# Patient Record
Sex: FEMALE | Race: OTHER | HISPANIC OR LATINO | Employment: UNEMPLOYED | URBAN - METROPOLITAN AREA
[De-identification: names, ages, dates, MRNs, and addresses within clinical notes are randomized per-mention and may not be internally consistent; named-entity substitution may affect disease eponyms.]

---

## 2023-08-19 ENCOUNTER — HOSPITAL ENCOUNTER (INPATIENT)
Facility: MEDICAL CENTER | Age: 33
LOS: 1 days | DRG: 419 | End: 2023-08-20
Attending: EMERGENCY MEDICINE | Admitting: SURGERY

## 2023-08-19 ENCOUNTER — ANESTHESIA (OUTPATIENT)
Dept: SURGERY | Facility: MEDICAL CENTER | Age: 33
DRG: 419 | End: 2023-08-19

## 2023-08-19 ENCOUNTER — ANESTHESIA EVENT (OUTPATIENT)
Dept: SURGERY | Facility: MEDICAL CENTER | Age: 33
DRG: 419 | End: 2023-08-19

## 2023-08-19 ENCOUNTER — APPOINTMENT (OUTPATIENT)
Dept: RADIOLOGY | Facility: MEDICAL CENTER | Age: 33
DRG: 419 | End: 2023-08-19
Attending: EMERGENCY MEDICINE

## 2023-08-19 DIAGNOSIS — R79.89 ELEVATED LFTS: ICD-10-CM

## 2023-08-19 DIAGNOSIS — K80.00 ACUTE CALCULOUS CHOLECYSTITIS: ICD-10-CM

## 2023-08-19 DIAGNOSIS — K80.20 SYMPTOMATIC CHOLELITHIASIS: ICD-10-CM

## 2023-08-19 DIAGNOSIS — K81.0 ACUTE CHOLECYSTITIS: ICD-10-CM

## 2023-08-19 LAB
ALBUMIN SERPL BCP-MCNC: 4.6 G/DL (ref 3.2–4.9)
ALBUMIN/GLOB SERPL: 1.5 G/DL
ALP SERPL-CCNC: 441 U/L (ref 30–99)
ALT SERPL-CCNC: 176 U/L (ref 2–50)
AMORPH CRY #/AREA URNS HPF: PRESENT /HPF
ANION GAP SERPL CALC-SCNC: 18 MMOL/L (ref 7–16)
APPEARANCE UR: CLEAR
AST SERPL-CCNC: 85 U/L (ref 12–45)
BACTERIA #/AREA URNS HPF: ABNORMAL /HPF
BASOPHILS # BLD AUTO: 0.4 % (ref 0–1.8)
BASOPHILS # BLD: 0.04 K/UL (ref 0–0.12)
BILIRUB SERPL-MCNC: 1.6 MG/DL (ref 0.1–1.5)
BILIRUB UR QL STRIP.AUTO: NEGATIVE
BUN SERPL-MCNC: 9 MG/DL (ref 8–22)
CALCIUM ALBUM COR SERPL-MCNC: 9.3 MG/DL (ref 8.5–10.5)
CALCIUM SERPL-MCNC: 9.8 MG/DL (ref 8.5–10.5)
CHLORIDE SERPL-SCNC: 105 MMOL/L (ref 96–112)
CO2 SERPL-SCNC: 16 MMOL/L (ref 20–33)
COLOR UR: YELLOW
CREAT SERPL-MCNC: 0.53 MG/DL (ref 0.5–1.4)
EOSINOPHIL # BLD AUTO: 0.07 K/UL (ref 0–0.51)
EOSINOPHIL NFR BLD: 0.7 % (ref 0–6.9)
EPI CELLS #/AREA URNS HPF: ABNORMAL /HPF
ERYTHROCYTE [DISTWIDTH] IN BLOOD BY AUTOMATED COUNT: 40.8 FL (ref 35.9–50)
GFR SERPLBLD CREATININE-BSD FMLA CKD-EPI: 126 ML/MIN/1.73 M 2
GLOBULIN SER CALC-MCNC: 3 G/DL (ref 1.9–3.5)
GLUCOSE SERPL-MCNC: 113 MG/DL (ref 65–99)
GLUCOSE UR STRIP.AUTO-MCNC: NEGATIVE MG/DL
HCG SERPL QL: NEGATIVE
HCT VFR BLD AUTO: 35.7 % (ref 37–47)
HGB BLD-MCNC: 11 G/DL (ref 12–16)
HYALINE CASTS #/AREA URNS LPF: ABNORMAL /LPF
IMM GRANULOCYTES # BLD AUTO: 0.03 K/UL (ref 0–0.11)
IMM GRANULOCYTES NFR BLD AUTO: 0.3 % (ref 0–0.9)
KETONES UR STRIP.AUTO-MCNC: ABNORMAL MG/DL
LEUKOCYTE ESTERASE UR QL STRIP.AUTO: ABNORMAL
LIPASE SERPL-CCNC: 44 U/L (ref 11–82)
LYMPHOCYTES # BLD AUTO: 1.68 K/UL (ref 1–4.8)
LYMPHOCYTES NFR BLD: 17.4 % (ref 22–41)
MCH RBC QN AUTO: 20.4 PG (ref 27–33)
MCHC RBC AUTO-ENTMCNC: 30.8 G/DL (ref 32.2–35.5)
MCV RBC AUTO: 66.2 FL (ref 81.4–97.8)
MICRO URNS: ABNORMAL
MONOCYTES # BLD AUTO: 0.52 K/UL (ref 0–0.85)
MONOCYTES NFR BLD AUTO: 5.4 % (ref 0–13.4)
NEUTROPHILS # BLD AUTO: 7.3 K/UL (ref 1.82–7.42)
NEUTROPHILS NFR BLD: 75.8 % (ref 44–72)
NITRITE UR QL STRIP.AUTO: NEGATIVE
NRBC # BLD AUTO: 0 K/UL
NRBC BLD-RTO: 0 /100 WBC (ref 0–0.2)
PH UR STRIP.AUTO: 8 [PH] (ref 5–8)
PLATELET # BLD AUTO: 219 K/UL (ref 164–446)
PMV BLD AUTO: 10.1 FL (ref 9–12.9)
POTASSIUM SERPL-SCNC: 3.4 MMOL/L (ref 3.6–5.5)
PROT SERPL-MCNC: 7.6 G/DL (ref 6–8.2)
PROT UR QL STRIP: NEGATIVE MG/DL
RBC # BLD AUTO: 5.39 M/UL (ref 4.2–5.4)
RBC # URNS HPF: ABNORMAL /HPF
RBC UR QL AUTO: NEGATIVE
SODIUM SERPL-SCNC: 139 MMOL/L (ref 135–145)
SP GR UR STRIP.AUTO: 1.01
UROBILINOGEN UR STRIP.AUTO-MCNC: 1 MG/DL
WBC # BLD AUTO: 9.6 K/UL (ref 4.8–10.8)
WBC #/AREA URNS HPF: ABNORMAL /HPF

## 2023-08-19 PROCEDURE — 700111 HCHG RX REV CODE 636 W/ 250 OVERRIDE (IP): Mod: JZ

## 2023-08-19 PROCEDURE — 700101 HCHG RX REV CODE 250: Performed by: SURGERY

## 2023-08-19 PROCEDURE — 160035 HCHG PACU - 1ST 60 MINS PHASE I: Performed by: SURGERY

## 2023-08-19 PROCEDURE — 96375 TX/PRO/DX INJ NEW DRUG ADDON: CPT

## 2023-08-19 PROCEDURE — 700111 HCHG RX REV CODE 636 W/ 250 OVERRIDE (IP): Mod: JZ | Performed by: EMERGENCY MEDICINE

## 2023-08-19 PROCEDURE — 160029 HCHG SURGERY MINUTES - 1ST 30 MINS LEVEL 4: Performed by: SURGERY

## 2023-08-19 PROCEDURE — 47562 LAPAROSCOPIC CHOLECYSTECTOMY: CPT | Mod: AS | Performed by: NURSE PRACTITIONER

## 2023-08-19 PROCEDURE — 700102 HCHG RX REV CODE 250 W/ 637 OVERRIDE(OP): Performed by: NURSE PRACTITIONER

## 2023-08-19 PROCEDURE — 770001 HCHG ROOM/CARE - MED/SURG/GYN PRIV*

## 2023-08-19 PROCEDURE — 80053 COMPREHEN METABOLIC PANEL: CPT

## 2023-08-19 PROCEDURE — 83690 ASSAY OF LIPASE: CPT

## 2023-08-19 PROCEDURE — A9270 NON-COVERED ITEM OR SERVICE: HCPCS | Performed by: STUDENT IN AN ORGANIZED HEALTH CARE EDUCATION/TRAINING PROGRAM

## 2023-08-19 PROCEDURE — 700111 HCHG RX REV CODE 636 W/ 250 OVERRIDE (IP): Performed by: STUDENT IN AN ORGANIZED HEALTH CARE EDUCATION/TRAINING PROGRAM

## 2023-08-19 PROCEDURE — 700111 HCHG RX REV CODE 636 W/ 250 OVERRIDE (IP)

## 2023-08-19 PROCEDURE — 700105 HCHG RX REV CODE 258: Mod: JZ | Performed by: STUDENT IN AN ORGANIZED HEALTH CARE EDUCATION/TRAINING PROGRAM

## 2023-08-19 PROCEDURE — 160048 HCHG OR STATISTICAL LEVEL 1-5: Performed by: SURGERY

## 2023-08-19 PROCEDURE — 160036 HCHG PACU - EA ADDL 30 MINS PHASE I: Performed by: SURGERY

## 2023-08-19 PROCEDURE — A9270 NON-COVERED ITEM OR SERVICE: HCPCS | Performed by: NURSE PRACTITIONER

## 2023-08-19 PROCEDURE — 700102 HCHG RX REV CODE 250 W/ 637 OVERRIDE(OP): Performed by: SURGERY

## 2023-08-19 PROCEDURE — 160009 HCHG ANES TIME/MIN: Performed by: SURGERY

## 2023-08-19 PROCEDURE — 700101 HCHG RX REV CODE 250: Performed by: STUDENT IN AN ORGANIZED HEALTH CARE EDUCATION/TRAINING PROGRAM

## 2023-08-19 PROCEDURE — 700111 HCHG RX REV CODE 636 W/ 250 OVERRIDE (IP): Mod: JZ | Performed by: STUDENT IN AN ORGANIZED HEALTH CARE EDUCATION/TRAINING PROGRAM

## 2023-08-19 PROCEDURE — 99291 CRITICAL CARE FIRST HOUR: CPT

## 2023-08-19 PROCEDURE — 0FT44ZZ RESECTION OF GALLBLADDER, PERCUTANEOUS ENDOSCOPIC APPROACH: ICD-10-PCS | Performed by: SURGERY

## 2023-08-19 PROCEDURE — 84703 CHORIONIC GONADOTROPIN ASSAY: CPT

## 2023-08-19 PROCEDURE — 76705 ECHO EXAM OF ABDOMEN: CPT

## 2023-08-19 PROCEDURE — 47562 LAPAROSCOPIC CHOLECYSTECTOMY: CPT | Performed by: SURGERY

## 2023-08-19 PROCEDURE — 160002 HCHG RECOVERY MINUTES (STAT): Performed by: SURGERY

## 2023-08-19 PROCEDURE — 160041 HCHG SURGERY MINUTES - EA ADDL 1 MIN LEVEL 4: Performed by: SURGERY

## 2023-08-19 PROCEDURE — 36415 COLL VENOUS BLD VENIPUNCTURE: CPT

## 2023-08-19 PROCEDURE — 700102 HCHG RX REV CODE 250 W/ 637 OVERRIDE(OP): Performed by: STUDENT IN AN ORGANIZED HEALTH CARE EDUCATION/TRAINING PROGRAM

## 2023-08-19 PROCEDURE — 99222 1ST HOSP IP/OBS MODERATE 55: CPT | Mod: 57 | Performed by: SURGERY

## 2023-08-19 PROCEDURE — 81001 URINALYSIS AUTO W/SCOPE: CPT

## 2023-08-19 PROCEDURE — 85025 COMPLETE CBC W/AUTO DIFF WBC: CPT

## 2023-08-19 PROCEDURE — A9270 NON-COVERED ITEM OR SERVICE: HCPCS | Performed by: SURGERY

## 2023-08-19 PROCEDURE — 96374 THER/PROPH/DIAG INJ IV PUSH: CPT

## 2023-08-19 RX ORDER — OXYCODONE HYDROCHLORIDE 5 MG/1
5 TABLET ORAL
Status: DISCONTINUED | OUTPATIENT
Start: 2023-08-19 | End: 2023-08-20

## 2023-08-19 RX ORDER — OXYCODONE HCL 5 MG/5 ML
5 SOLUTION, ORAL ORAL
Status: COMPLETED | OUTPATIENT
Start: 2023-08-19 | End: 2023-08-19

## 2023-08-19 RX ORDER — MORPHINE SULFATE 4 MG/ML
4 INJECTION INTRAVENOUS ONCE
Status: COMPLETED | OUTPATIENT
Start: 2023-08-19 | End: 2023-08-19

## 2023-08-19 RX ORDER — ACETAMINOPHEN 500 MG
1000 TABLET ORAL EVERY 6 HOURS
Status: DISCONTINUED | OUTPATIENT
Start: 2023-08-20 | End: 2023-08-20 | Stop reason: HOSPADM

## 2023-08-19 RX ORDER — CELECOXIB 200 MG/1
400 CAPSULE ORAL ONCE
Status: COMPLETED | OUTPATIENT
Start: 2023-08-19 | End: 2023-08-19

## 2023-08-19 RX ORDER — CELECOXIB 200 MG/1
200 CAPSULE ORAL 2 TIMES DAILY PRN
Status: DISCONTINUED | OUTPATIENT
Start: 2023-08-24 | End: 2023-08-20 | Stop reason: HOSPADM

## 2023-08-19 RX ORDER — ENOXAPARIN SODIUM 100 MG/ML
40 INJECTION SUBCUTANEOUS DAILY
Status: DISCONTINUED | OUTPATIENT
Start: 2023-08-20 | End: 2023-08-20 | Stop reason: HOSPADM

## 2023-08-19 RX ORDER — DIPHENHYDRAMINE HYDROCHLORIDE 50 MG/ML
12.5 INJECTION INTRAMUSCULAR; INTRAVENOUS
Status: DISCONTINUED | OUTPATIENT
Start: 2023-08-19 | End: 2023-08-19 | Stop reason: HOSPADM

## 2023-08-19 RX ORDER — EPHEDRINE SULFATE 50 MG/ML
5 INJECTION, SOLUTION INTRAVENOUS
Status: DISCONTINUED | OUTPATIENT
Start: 2023-08-19 | End: 2023-08-19 | Stop reason: HOSPADM

## 2023-08-19 RX ORDER — BUPIVACAINE HYDROCHLORIDE AND EPINEPHRINE 5; 5 MG/ML; UG/ML
INJECTION, SOLUTION EPIDURAL; INTRACAUDAL; PERINEURAL
Status: DISCONTINUED | OUTPATIENT
Start: 2023-08-19 | End: 2023-08-19 | Stop reason: HOSPADM

## 2023-08-19 RX ORDER — HYDRALAZINE HYDROCHLORIDE 20 MG/ML
5 INJECTION INTRAMUSCULAR; INTRAVENOUS
Status: DISCONTINUED | OUTPATIENT
Start: 2023-08-19 | End: 2023-08-19 | Stop reason: HOSPADM

## 2023-08-19 RX ORDER — OXYCODONE HCL 5 MG/5 ML
10 SOLUTION, ORAL ORAL
Status: COMPLETED | OUTPATIENT
Start: 2023-08-19 | End: 2023-08-19

## 2023-08-19 RX ORDER — SODIUM CHLORIDE, SODIUM LACTATE, POTASSIUM CHLORIDE, CALCIUM CHLORIDE 600; 310; 30; 20 MG/100ML; MG/100ML; MG/100ML; MG/100ML
INJECTION, SOLUTION INTRAVENOUS CONTINUOUS
Status: DISCONTINUED | OUTPATIENT
Start: 2023-08-19 | End: 2023-08-19 | Stop reason: HOSPADM

## 2023-08-19 RX ORDER — ONDANSETRON 4 MG/1
4 TABLET, ORALLY DISINTEGRATING ORAL EVERY 4 HOURS PRN
Status: DISCONTINUED | OUTPATIENT
Start: 2023-08-19 | End: 2023-08-20 | Stop reason: HOSPADM

## 2023-08-19 RX ORDER — ONDANSETRON 4 MG/1
4 TABLET, ORALLY DISINTEGRATING ORAL ONCE
Status: COMPLETED | OUTPATIENT
Start: 2023-08-19 | End: 2023-08-19

## 2023-08-19 RX ORDER — LAMOTRIGINE 100 MG/1
100 TABLET ORAL 2 TIMES DAILY
COMMUNITY

## 2023-08-19 RX ORDER — ENEMA 19; 7 G/133ML; G/133ML
1 ENEMA RECTAL
Status: DISCONTINUED | OUTPATIENT
Start: 2023-08-19 | End: 2023-08-20 | Stop reason: HOSPADM

## 2023-08-19 RX ORDER — BISACODYL 10 MG
10 SUPPOSITORY, RECTAL RECTAL
Status: DISCONTINUED | OUTPATIENT
Start: 2023-08-19 | End: 2023-08-20 | Stop reason: HOSPADM

## 2023-08-19 RX ORDER — ROCURONIUM BROMIDE 10 MG/ML
INJECTION, SOLUTION INTRAVENOUS PRN
Status: DISCONTINUED | OUTPATIENT
Start: 2023-08-19 | End: 2023-08-19 | Stop reason: SURG

## 2023-08-19 RX ORDER — ONDANSETRON 2 MG/ML
INJECTION INTRAMUSCULAR; INTRAVENOUS
Status: COMPLETED
Start: 2023-08-19 | End: 2023-08-19

## 2023-08-19 RX ORDER — CEFOTETAN DISODIUM 2 G/20ML
INJECTION, POWDER, FOR SOLUTION INTRAMUSCULAR; INTRAVENOUS PRN
Status: DISCONTINUED | OUTPATIENT
Start: 2023-08-19 | End: 2023-08-19 | Stop reason: SURG

## 2023-08-19 RX ORDER — ONDANSETRON 2 MG/ML
4 INJECTION INTRAMUSCULAR; INTRAVENOUS ONCE
Status: COMPLETED | OUTPATIENT
Start: 2023-08-19 | End: 2023-08-19

## 2023-08-19 RX ORDER — AMOXICILLIN 250 MG
1 CAPSULE ORAL
Status: DISCONTINUED | OUTPATIENT
Start: 2023-08-19 | End: 2023-08-20 | Stop reason: HOSPADM

## 2023-08-19 RX ORDER — HYDROMORPHONE HYDROCHLORIDE 1 MG/ML
0.1 INJECTION, SOLUTION INTRAMUSCULAR; INTRAVENOUS; SUBCUTANEOUS
Status: DISCONTINUED | OUTPATIENT
Start: 2023-08-19 | End: 2023-08-19 | Stop reason: HOSPADM

## 2023-08-19 RX ORDER — HYDROMORPHONE HYDROCHLORIDE 2 MG/ML
INJECTION, SOLUTION INTRAMUSCULAR; INTRAVENOUS; SUBCUTANEOUS PRN
Status: DISCONTINUED | OUTPATIENT
Start: 2023-08-19 | End: 2023-08-19 | Stop reason: SURG

## 2023-08-19 RX ORDER — MIDAZOLAM HYDROCHLORIDE 1 MG/ML
INJECTION INTRAMUSCULAR; INTRAVENOUS PRN
Status: DISCONTINUED | OUTPATIENT
Start: 2023-08-19 | End: 2023-08-19 | Stop reason: SURG

## 2023-08-19 RX ORDER — SODIUM CHLORIDE, SODIUM LACTATE, POTASSIUM CHLORIDE, CALCIUM CHLORIDE 600; 310; 30; 20 MG/100ML; MG/100ML; MG/100ML; MG/100ML
INJECTION, SOLUTION INTRAVENOUS
Status: DISCONTINUED | OUTPATIENT
Start: 2023-08-19 | End: 2023-08-19 | Stop reason: SURG

## 2023-08-19 RX ORDER — IBUPROFEN 200 MG
400 TABLET ORAL EVERY 6 HOURS PRN
COMMUNITY

## 2023-08-19 RX ORDER — DEXAMETHASONE SODIUM PHOSPHATE 4 MG/ML
INJECTION, SOLUTION INTRA-ARTICULAR; INTRALESIONAL; INTRAMUSCULAR; INTRAVENOUS; SOFT TISSUE PRN
Status: DISCONTINUED | OUTPATIENT
Start: 2023-08-19 | End: 2023-08-19 | Stop reason: SURG

## 2023-08-19 RX ORDER — LAMOTRIGINE 100 MG/1
100 TABLET ORAL 2 TIMES DAILY
Status: DISCONTINUED | OUTPATIENT
Start: 2023-08-19 | End: 2023-08-20 | Stop reason: HOSPADM

## 2023-08-19 RX ORDER — HYDROMORPHONE HYDROCHLORIDE 1 MG/ML
0.2 INJECTION, SOLUTION INTRAMUSCULAR; INTRAVENOUS; SUBCUTANEOUS
Status: DISCONTINUED | OUTPATIENT
Start: 2023-08-19 | End: 2023-08-19 | Stop reason: HOSPADM

## 2023-08-19 RX ORDER — DOCUSATE SODIUM 100 MG/1
100 CAPSULE, LIQUID FILLED ORAL 2 TIMES DAILY
Status: DISCONTINUED | OUTPATIENT
Start: 2023-08-19 | End: 2023-08-20 | Stop reason: HOSPADM

## 2023-08-19 RX ORDER — HYDROMORPHONE HYDROCHLORIDE 1 MG/ML
0.4 INJECTION, SOLUTION INTRAMUSCULAR; INTRAVENOUS; SUBCUTANEOUS
Status: DISCONTINUED | OUTPATIENT
Start: 2023-08-19 | End: 2023-08-19 | Stop reason: HOSPADM

## 2023-08-19 RX ORDER — CELECOXIB 200 MG/1
200 CAPSULE ORAL 2 TIMES DAILY
Status: DISCONTINUED | OUTPATIENT
Start: 2023-08-19 | End: 2023-08-20 | Stop reason: HOSPADM

## 2023-08-19 RX ORDER — ACETAMINOPHEN 500 MG
1000 TABLET ORAL ONCE
Status: COMPLETED | OUTPATIENT
Start: 2023-08-19 | End: 2023-08-19

## 2023-08-19 RX ORDER — LIDOCAINE HYDROCHLORIDE 20 MG/ML
INJECTION, SOLUTION EPIDURAL; INFILTRATION; INTRACAUDAL; PERINEURAL PRN
Status: DISCONTINUED | OUTPATIENT
Start: 2023-08-19 | End: 2023-08-19 | Stop reason: SURG

## 2023-08-19 RX ORDER — ONDANSETRON 2 MG/ML
4 INJECTION INTRAMUSCULAR; INTRAVENOUS EVERY 4 HOURS PRN
Status: DISCONTINUED | OUTPATIENT
Start: 2023-08-19 | End: 2023-08-20 | Stop reason: HOSPADM

## 2023-08-19 RX ORDER — AMOXICILLIN 250 MG
1 CAPSULE ORAL NIGHTLY
Status: DISCONTINUED | OUTPATIENT
Start: 2023-08-19 | End: 2023-08-20 | Stop reason: HOSPADM

## 2023-08-19 RX ORDER — HALOPERIDOL 5 MG/ML
1 INJECTION INTRAMUSCULAR
Status: COMPLETED | OUTPATIENT
Start: 2023-08-19 | End: 2023-08-19

## 2023-08-19 RX ORDER — POLYETHYLENE GLYCOL 3350 17 G/17G
1 POWDER, FOR SOLUTION ORAL 2 TIMES DAILY
Status: DISCONTINUED | OUTPATIENT
Start: 2023-08-19 | End: 2023-08-20 | Stop reason: HOSPADM

## 2023-08-19 RX ORDER — ACETAMINOPHEN 500 MG
1000 TABLET ORAL EVERY 6 HOURS PRN
Status: DISCONTINUED | OUTPATIENT
Start: 2023-08-25 | End: 2023-08-20 | Stop reason: HOSPADM

## 2023-08-19 RX ORDER — ONDANSETRON 2 MG/ML
4 INJECTION INTRAMUSCULAR; INTRAVENOUS
Status: COMPLETED | OUTPATIENT
Start: 2023-08-19 | End: 2023-08-19

## 2023-08-19 RX ADMIN — HALOPERIDOL LACTATE 1 MG: 5 INJECTION, SOLUTION INTRAMUSCULAR at 22:20

## 2023-08-19 RX ADMIN — ROCURONIUM BROMIDE 20 MG: 50 INJECTION, SOLUTION INTRAVENOUS at 20:43

## 2023-08-19 RX ADMIN — FENTANYL CITRATE 25 MCG: 50 INJECTION, SOLUTION INTRAMUSCULAR; INTRAVENOUS at 22:42

## 2023-08-19 RX ADMIN — ONDANSETRON 4 MG: 2 INJECTION INTRAMUSCULAR; INTRAVENOUS at 21:39

## 2023-08-19 RX ADMIN — FENTANYL CITRATE 50 MCG: 50 INJECTION, SOLUTION INTRAMUSCULAR; INTRAVENOUS at 20:50

## 2023-08-19 RX ADMIN — CELECOXIB 400 MG: 200 CAPSULE ORAL at 18:45

## 2023-08-19 RX ADMIN — HYDROMORPHONE HYDROCHLORIDE 0.5 MG: 2 INJECTION INTRAMUSCULAR; INTRAVENOUS; SUBCUTANEOUS at 21:06

## 2023-08-19 RX ADMIN — DEXAMETHASONE SODIUM PHOSPHATE 8 MG: 4 INJECTION INTRA-ARTICULAR; INTRALESIONAL; INTRAMUSCULAR; INTRAVENOUS; SOFT TISSUE at 20:18

## 2023-08-19 RX ADMIN — PROPOFOL 150 MG: 10 INJECTION, EMULSION INTRAVENOUS at 20:16

## 2023-08-19 RX ADMIN — LAMOTRIGINE 100 MG: 100 TABLET ORAL at 23:29

## 2023-08-19 RX ADMIN — CELECOXIB 200 MG: 200 CAPSULE ORAL at 23:29

## 2023-08-19 RX ADMIN — FENTANYL CITRATE 50 MCG: 50 INJECTION, SOLUTION INTRAMUSCULAR; INTRAVENOUS at 20:30

## 2023-08-19 RX ADMIN — HALOPERIDOL LACTATE 1 MG: 5 INJECTION, SOLUTION INTRAMUSCULAR at 21:46

## 2023-08-19 RX ADMIN — SODIUM CHLORIDE, POTASSIUM CHLORIDE, SODIUM LACTATE AND CALCIUM CHLORIDE: 600; 310; 30; 20 INJECTION, SOLUTION INTRAVENOUS at 20:11

## 2023-08-19 RX ADMIN — HYDROMORPHONE HYDROCHLORIDE 0.5 MG: 2 INJECTION INTRAMUSCULAR; INTRAVENOUS; SUBCUTANEOUS at 21:17

## 2023-08-19 RX ADMIN — ROCURONIUM BROMIDE 40 MG: 50 INJECTION, SOLUTION INTRAVENOUS at 20:16

## 2023-08-19 RX ADMIN — FENTANYL CITRATE 50 MCG: 50 INJECTION, SOLUTION INTRAMUSCULAR; INTRAVENOUS at 21:43

## 2023-08-19 RX ADMIN — SENNOSIDES AND DOCUSATE SODIUM 1 TABLET: 50; 8.6 TABLET ORAL at 23:29

## 2023-08-19 RX ADMIN — FENTANYL CITRATE 100 MCG: 50 INJECTION, SOLUTION INTRAMUSCULAR; INTRAVENOUS at 20:16

## 2023-08-19 RX ADMIN — LIDOCAINE HYDROCHLORIDE 30 MG: 20 INJECTION, SOLUTION EPIDURAL; INFILTRATION; INTRACAUDAL at 20:16

## 2023-08-19 RX ADMIN — FENTANYL CITRATE 50 MCG: 50 INJECTION, SOLUTION INTRAMUSCULAR; INTRAVENOUS at 21:51

## 2023-08-19 RX ADMIN — OXYCODONE HYDROCHLORIDE 10 MG: 5 SOLUTION ORAL at 21:57

## 2023-08-19 RX ADMIN — SUGAMMADEX 200 MG: 100 INJECTION, SOLUTION INTRAVENOUS at 21:11

## 2023-08-19 RX ADMIN — ACETAMINOPHEN 1000 MG: 500 TABLET, FILM COATED ORAL at 23:29

## 2023-08-19 RX ADMIN — OXYCODONE HYDROCHLORIDE 5 MG: 5 TABLET ORAL at 23:33

## 2023-08-19 RX ADMIN — CEFOTETAN DISODIUM 2 G: 2 INJECTION, POWDER, FOR SOLUTION INTRAMUSCULAR; INTRAVENOUS at 20:18

## 2023-08-19 RX ADMIN — HYDROMORPHONE HYDROCHLORIDE 0.2 MG: 1 INJECTION, SOLUTION INTRAMUSCULAR; INTRAVENOUS; SUBCUTANEOUS at 22:15

## 2023-08-19 RX ADMIN — MORPHINE SULFATE 4 MG: 4 INJECTION, SOLUTION INTRAMUSCULAR; INTRAVENOUS at 15:38

## 2023-08-19 RX ADMIN — ONDANSETRON 4 MG: 2 INJECTION INTRAMUSCULAR; INTRAVENOUS at 15:38

## 2023-08-19 RX ADMIN — ONDANSETRON 4 MG: 4 TABLET, ORALLY DISINTEGRATING ORAL at 14:10

## 2023-08-19 RX ADMIN — HYDROMORPHONE HYDROCHLORIDE 0.4 MG: 1 INJECTION, SOLUTION INTRAMUSCULAR; INTRAVENOUS; SUBCUTANEOUS at 21:59

## 2023-08-19 RX ADMIN — HYDROMORPHONE HYDROCHLORIDE 0.4 MG: 1 INJECTION, SOLUTION INTRAMUSCULAR; INTRAVENOUS; SUBCUTANEOUS at 22:25

## 2023-08-19 RX ADMIN — MIDAZOLAM 2 MG: 1 INJECTION, SOLUTION INTRAMUSCULAR; INTRAVENOUS at 20:11

## 2023-08-19 RX ADMIN — FENTANYL CITRATE 50 MCG: 50 INJECTION, SOLUTION INTRAMUSCULAR; INTRAVENOUS at 20:59

## 2023-08-19 RX ADMIN — DEXAMETHASONE SODIUM PHOSPHATE 4 MG: 4 INJECTION INTRA-ARTICULAR; INTRALESIONAL; INTRAMUSCULAR; INTRAVENOUS; SOFT TISSUE at 21:07

## 2023-08-19 RX ADMIN — ACETAMINOPHEN 1000 MG: 500 TABLET, FILM COATED ORAL at 18:45

## 2023-08-19 ASSESSMENT — PAIN DESCRIPTION - PAIN TYPE
TYPE: SURGICAL PAIN
TYPE: ACUTE PAIN
TYPE: ACUTE PAIN
TYPE: SURGICAL PAIN

## 2023-08-19 ASSESSMENT — PAIN SCALES - GENERAL: PAIN_LEVEL: 6

## 2023-08-19 ASSESSMENT — FIBROSIS 4 INDEX: FIB4 SCORE: 0.94

## 2023-08-19 NOTE — ED NOTES
Patient medicated per mar, updated on poc, instructed not to eat or drink anything until cleared by ERP

## 2023-08-19 NOTE — ED TRIAGE NOTES
"Chief Complaint   Patient presents with    RUQ Pain     Patient to triage via wheelchair reports having RUQ pain since Friday, worsening over the last few days. Worst its been today. Patient reports that pain worsens each time she eats. Still has gallbladder. Patient reports nausea, denies vomiting and diarrhea.        /58   Pulse 74   Temp 36.7 °C (98 °F) (Temporal)   Resp 16   Ht 1.651 m (5' 5\")   Wt 79.1 kg (174 lb 6.1 oz)   SpO2 100%     Patient educated on ed triage process, instructed to notify staff of any new or worsening symptoms, verbalizes understanding. Patient returned to ed lobby, apologized for wait times.     "

## 2023-08-20 ENCOUNTER — PHARMACY VISIT (OUTPATIENT)
Dept: PHARMACY | Facility: MEDICAL CENTER | Age: 33
End: 2023-08-20
Payer: COMMERCIAL

## 2023-08-20 VITALS
SYSTOLIC BLOOD PRESSURE: 102 MMHG | OXYGEN SATURATION: 98 % | DIASTOLIC BLOOD PRESSURE: 59 MMHG | TEMPERATURE: 98.1 F | BODY MASS INDEX: 29.82 KG/M2 | HEART RATE: 60 BPM | RESPIRATION RATE: 17 BRPM | WEIGHT: 179 LBS | HEIGHT: 65 IN

## 2023-08-20 LAB
ALBUMIN SERPL BCP-MCNC: 4.2 G/DL (ref 3.2–4.9)
ALBUMIN/GLOB SERPL: 1.5 G/DL
ALP SERPL-CCNC: 520 U/L (ref 30–99)
ALT SERPL-CCNC: 308 U/L (ref 2–50)
ANION GAP SERPL CALC-SCNC: 11 MMOL/L (ref 7–16)
ANION GAP SERPL CALC-SCNC: 11 MMOL/L (ref 7–16)
AST SERPL-CCNC: 325 U/L (ref 12–45)
BASOPHILS # BLD AUTO: 0.2 % (ref 0–1.8)
BASOPHILS # BLD: 0.02 K/UL (ref 0–0.12)
BILIRUB SERPL-MCNC: 1.1 MG/DL (ref 0.1–1.5)
BUN SERPL-MCNC: 6 MG/DL (ref 8–22)
BUN SERPL-MCNC: 6 MG/DL (ref 8–22)
CALCIUM ALBUM COR SERPL-MCNC: 8.6 MG/DL (ref 8.5–10.5)
CALCIUM SERPL-MCNC: 8.8 MG/DL (ref 8.5–10.5)
CALCIUM SERPL-MCNC: 8.8 MG/DL (ref 8.5–10.5)
CHLORIDE SERPL-SCNC: 105 MMOL/L (ref 96–112)
CHLORIDE SERPL-SCNC: 105 MMOL/L (ref 96–112)
CO2 SERPL-SCNC: 20 MMOL/L (ref 20–33)
CO2 SERPL-SCNC: 20 MMOL/L (ref 20–33)
COMMENT 1642: NORMAL
CREAT SERPL-MCNC: 0.48 MG/DL (ref 0.5–1.4)
CREAT SERPL-MCNC: 0.48 MG/DL (ref 0.5–1.4)
EOSINOPHIL # BLD AUTO: 0 K/UL (ref 0–0.51)
EOSINOPHIL NFR BLD: 0 % (ref 0–6.9)
ERYTHROCYTE [DISTWIDTH] IN BLOOD BY AUTOMATED COUNT: 41.4 FL (ref 35.9–50)
GFR SERPLBLD CREATININE-BSD FMLA CKD-EPI: 129 ML/MIN/1.73 M 2
GLOBULIN SER CALC-MCNC: 2.8 G/DL (ref 1.9–3.5)
GLUCOSE SERPL-MCNC: 117 MG/DL (ref 65–99)
GLUCOSE SERPL-MCNC: 117 MG/DL (ref 65–99)
HCT VFR BLD AUTO: 37.6 % (ref 37–47)
HGB BLD-MCNC: 11.2 G/DL (ref 12–16)
IMM GRANULOCYTES # BLD AUTO: 0.04 K/UL (ref 0–0.11)
IMM GRANULOCYTES NFR BLD AUTO: 0.3 % (ref 0–0.9)
LYMPHOCYTES # BLD AUTO: 0.53 K/UL (ref 1–4.8)
LYMPHOCYTES NFR BLD: 4.5 % (ref 22–41)
MCH RBC QN AUTO: 20.2 PG (ref 27–33)
MCHC RBC AUTO-ENTMCNC: 29.8 G/DL (ref 32.2–35.5)
MCV RBC AUTO: 67.9 FL (ref 81.4–97.8)
MICROCYTES BLD QL SMEAR: ABNORMAL
MONOCYTES # BLD AUTO: 0.22 K/UL (ref 0–0.85)
MONOCYTES NFR BLD AUTO: 1.9 % (ref 0–13.4)
MORPHOLOGY BLD-IMP: NORMAL
NEUTROPHILS # BLD AUTO: 10.92 K/UL (ref 1.82–7.42)
NEUTROPHILS NFR BLD: 93.1 % (ref 44–72)
NRBC # BLD AUTO: 0 K/UL
NRBC BLD-RTO: 0 /100 WBC (ref 0–0.2)
OVALOCYTES BLD QL SMEAR: NORMAL
PLATELET # BLD AUTO: 195 K/UL (ref 164–446)
PLATELET BLD QL SMEAR: NORMAL
PMV BLD AUTO: 9.9 FL (ref 9–12.9)
POIKILOCYTOSIS BLD QL SMEAR: NORMAL
POTASSIUM SERPL-SCNC: 4.1 MMOL/L (ref 3.6–5.5)
POTASSIUM SERPL-SCNC: 4.1 MMOL/L (ref 3.6–5.5)
PROT SERPL-MCNC: 7 G/DL (ref 6–8.2)
RBC # BLD AUTO: 5.54 M/UL (ref 4.2–5.4)
RBC BLD AUTO: PRESENT
SODIUM SERPL-SCNC: 136 MMOL/L (ref 135–145)
SODIUM SERPL-SCNC: 136 MMOL/L (ref 135–145)
WBC # BLD AUTO: 11.7 K/UL (ref 4.8–10.8)

## 2023-08-20 PROCEDURE — 99024 POSTOP FOLLOW-UP VISIT: CPT

## 2023-08-20 PROCEDURE — 36415 COLL VENOUS BLD VENIPUNCTURE: CPT

## 2023-08-20 PROCEDURE — 80053 COMPREHEN METABOLIC PANEL: CPT

## 2023-08-20 PROCEDURE — 700111 HCHG RX REV CODE 636 W/ 250 OVERRIDE (IP): Mod: JZ | Performed by: NURSE PRACTITIONER

## 2023-08-20 PROCEDURE — 88304 TISSUE EXAM BY PATHOLOGIST: CPT

## 2023-08-20 PROCEDURE — RXMED WILLOW AMBULATORY MEDICATION CHARGE

## 2023-08-20 PROCEDURE — 85025 COMPLETE CBC W/AUTO DIFF WBC: CPT

## 2023-08-20 PROCEDURE — 700102 HCHG RX REV CODE 250 W/ 637 OVERRIDE(OP): Performed by: NURSE PRACTITIONER

## 2023-08-20 PROCEDURE — A9270 NON-COVERED ITEM OR SERVICE: HCPCS | Performed by: NURSE PRACTITIONER

## 2023-08-20 RX ORDER — ACETAMINOPHEN 500 MG
1000 TABLET ORAL EVERY 6 HOURS
Qty: 30 TABLET | Refills: 0 | COMMUNITY
Start: 2023-08-20 | End: 2023-08-25

## 2023-08-20 RX ORDER — OXYCODONE HYDROCHLORIDE 5 MG/1
5-10 TABLET ORAL
Status: COMPLETED | OUTPATIENT
Start: 2023-08-20 | End: 2023-08-20

## 2023-08-20 RX ORDER — OXYCODONE HYDROCHLORIDE 5 MG/1
5 TABLET ORAL EVERY 4 HOURS PRN
Qty: 12 TABLET | Refills: 0 | Status: SHIPPED | OUTPATIENT
Start: 2023-08-20 | End: 2023-08-22

## 2023-08-20 RX ORDER — MORPHINE SULFATE 4 MG/ML
4 INJECTION INTRAVENOUS
Status: DISCONTINUED | OUTPATIENT
Start: 2023-08-20 | End: 2023-08-20 | Stop reason: HOSPADM

## 2023-08-20 RX ORDER — AMOXICILLIN 250 MG
1 CAPSULE ORAL
Qty: 30 TABLET | Refills: 0 | COMMUNITY
Start: 2023-08-20 | End: 2023-08-25

## 2023-08-20 RX ORDER — MORPHINE SULFATE 4 MG/ML
4 INJECTION INTRAVENOUS
Status: DISCONTINUED | OUTPATIENT
Start: 2023-08-20 | End: 2023-08-20

## 2023-08-20 RX ADMIN — ACETAMINOPHEN 1000 MG: 500 TABLET, FILM COATED ORAL at 05:25

## 2023-08-20 RX ADMIN — MORPHINE SULFATE 4 MG: 4 INJECTION, SOLUTION INTRAMUSCULAR; INTRAVENOUS at 05:24

## 2023-08-20 RX ADMIN — ENOXAPARIN SODIUM 40 MG: 100 INJECTION SUBCUTANEOUS at 05:28

## 2023-08-20 RX ADMIN — OXYCODONE HYDROCHLORIDE 5 MG: 5 TABLET ORAL at 07:28

## 2023-08-20 RX ADMIN — MORPHINE SULFATE 4 MG: 4 INJECTION, SOLUTION INTRAMUSCULAR; INTRAVENOUS at 00:56

## 2023-08-20 RX ADMIN — OXYCODONE HYDROCHLORIDE 5 MG: 5 TABLET ORAL at 02:47

## 2023-08-20 RX ADMIN — CELECOXIB 200 MG: 200 CAPSULE ORAL at 15:39

## 2023-08-20 RX ADMIN — DOCUSATE SODIUM 100 MG: 100 CAPSULE, LIQUID FILLED ORAL at 05:25

## 2023-08-20 RX ADMIN — LAMOTRIGINE 100 MG: 100 TABLET ORAL at 10:00

## 2023-08-20 RX ADMIN — CELECOXIB 200 MG: 200 CAPSULE ORAL at 05:25

## 2023-08-20 RX ADMIN — ACETAMINOPHEN 1000 MG: 500 TABLET, FILM COATED ORAL at 11:29

## 2023-08-20 RX ADMIN — OXYCODONE HYDROCHLORIDE 5 MG: 5 TABLET ORAL at 11:29

## 2023-08-20 ASSESSMENT — COGNITIVE AND FUNCTIONAL STATUS - GENERAL
DRESSING REGULAR LOWER BODY CLOTHING: A LITTLE
SUGGESTED CMS G CODE MODIFIER DAILY ACTIVITY: CJ
WALKING IN HOSPITAL ROOM: A LITTLE
CLIMB 3 TO 5 STEPS WITH RAILING: A LITTLE
DAILY ACTIVITIY SCORE: 22
TOILETING: A LITTLE
MOBILITY SCORE: 19
MOVING FROM LYING ON BACK TO SITTING ON SIDE OF FLAT BED: A LITTLE
TURNING FROM BACK TO SIDE WHILE IN FLAT BAD: A LITTLE
SUGGESTED CMS G CODE MODIFIER MOBILITY: CK
STANDING UP FROM CHAIR USING ARMS: A LITTLE

## 2023-08-20 ASSESSMENT — COPD QUESTIONNAIRES
HAVE YOU SMOKED AT LEAST 100 CIGARETTES IN YOUR ENTIRE LIFE: NO/DON'T KNOW
DO YOU EVER COUGH UP ANY MUCUS OR PHLEGM?: NO/ONLY WITH OCCASIONAL COLDS OR INFECTIONS
COPD SCREENING SCORE: 0
DURING THE PAST 4 WEEKS HOW MUCH DID YOU FEEL SHORT OF BREATH: NONE/LITTLE OF THE TIME

## 2023-08-20 ASSESSMENT — PAIN DESCRIPTION - PAIN TYPE
TYPE: ACUTE PAIN
TYPE: ACUTE PAIN
TYPE: ACUTE PAIN;SURGICAL PAIN
TYPE: ACUTE PAIN

## 2023-08-20 NOTE — PROGRESS NOTES
Patient discharged home per MD orders. Patient ambulating independently. Patient on room air  saturating >90%. Discharge education provided, incisional care, follow up appointments, medication safety, patient demonstrated and verbalized understanding. All questions answered. Tolerating diet. Voiding without difficulty. IV removed. All patient belongings with patient at this time. Patient educated to call MD or return to ED for concerns.

## 2023-08-20 NOTE — DISCHARGE SUMMARY
DISCHARGE  SUMMARY    DATE OF ADMISSION: 8/19/2023    DATE OF DISCHARGE: 8/20/2023    DISCHARGE DIAGNOSIS:  Principal Problem:    Acute calculous cholecystitis  Active Problems:    Acute cholecystitis  Resolved Problems:    * No resolved hospital problems. *      CONSULTATIONS:  None    PROCEDURES:  Laparoscopic cholecystectomy, performed on 8/19/2023, by Dr. Ky Ward.    BRIEF HPI and HOSPITAL COURSE:  Presented to the emergency department with a 2-day history of moderate right upper quadrant abdominal pain.  She associated the pain with nausea, vomiting, anorexia and diarrhea.  She reported intermittent precipitation and exacerbation of symptoms with ingestion of fatty foods.  Ultrasound of the right upper quadrant demonstrated stones and sludge within the gallbladder.  There is no pericholecystic fluid. Patient had elevated LFTs, and a T. bili of 1.6.  The patient was taken to the operating suite for a laparoscopic cholecystectomy.  She was admitted to the hospital for pain management.  On the day of discharge, the patient was tolerating a regular diet and the Tbili came down to 1.1.  Her LFTs are slightly elevated but that is expected post surgery.  She had improved pain and is ambulating.  I discussed prescriptions and follow-up with the patient.  All questions were answered.    DISPOSITION:   Discharged home in stable condition on 8/20/2023. The patient was counseled and questions were answered. Specifically, signs and symptoms of infection, respiratory decompensation and persistent or worsening pain were discussed and the patient agrees to seek medical attention if any of these develop.    DISCHARGE MEDICATIONS:  The patients controlled substance history was reviewed and a controlled substance use informed consent (if applicable) was provided by [facility] and the patient has been prescribed.     Medication List        START taking these medications        Instructions    acetaminophen 500 MG Tabs  Commonly known as: Tylenol   Take 2 Tablets by mouth every 6 hours. Take as directed on the bottle. As needed for pain  Dose: 1,000 mg     oxyCODONE immediate-release 5 MG Tabs  Commonly known as: Roxicodone   Take 1 Tablet by mouth every four hours as needed for Severe Pain for up to 2 days.  Dose: 5 mg     senna-docusate 8.6-50 MG Tabs  Commonly known as: Pericolace Or Senokot S   Take 1 Tablet by mouth every 24 hours as needed for Constipation. Take as directed on the bottle. Take while on narcotics  Dose: 1 Tablet            CONTINUE taking these medications        Instructions   Advil 200 MG Tabs  Generic drug: ibuprofen   Take 200 mg by mouth every 6 hours as needed for Mild Pain.  Dose: 200 mg     lamoTRIgine 100 MG Tabs  Commonly known as: LaMICtal   Take 100 mg by mouth 2 times a day.  Dose: 100 mg            STOP taking these medications      PERCOCET PO            You will be given a prescription for pain medication at discharge. Please take these as directed. It is important to remember not to take medications on an empty stomach as this may cause nausea.  You may also take over the counter acetaminophen and/or NSAIDS (ibuprofen, Aleve, Advil, Motrin) per the package instructions.  You may also use ice to the wound to decrease pain and swelling. You may alternate 20 minutes on and 20 minutes off with the ice for the first 24-48 hours. Make sure you place a washcloth or towel between the ice pack and your skin.  Please note that narcotic pain medication cannot be refilled unless you are seen by a doctor. Make sure you call the office if you are running low on medication or if the dose you have been prescribed is not working well for you.    ACTIVITY:  After discharge from the hospital, you may resume full routine activities; however, there should be no heavy lifting (greater than 20 pounds or a bag of groceries) and no strenuous activities for at least 2 weeks. The duration  may be longer, depending on your surgical procedure. Routine activities of daily living are acceptable. Activity level should be addressed at your post-op follow up appointment. You may drive whenever you are off pain medications and are able to perform the activities needed to drive, i.e., turning, bending, twisting, etc.      WOUND CARE:  You may shower, but do not submerge in a bath for at least two weeks. If you have wound dressings, they may come off after 48 hours. If you have skin glue to the wound, this will fall off on its own, do not pick at it. If you have steri strips to the wound, these will fall off on their own, do not pick at them, may trim the edges if needed.      DIET:  Upon discharge from the hospital, you may resume your normal preoperative diet, unless specifically directed otherwise. Depending on how you are feeling and whether you have nausea or not, you may wish to stay with a bland diet for the first few days. However, you can advance this as quickly as you feel ready.      FOLLOW UP:  [facility]  [id number]  [place] [address]  [phone]    Follow up  Follow up in the [facility] clinic as needed.    PCP    Follow up  Follow up in 1-2 weeks to inform them of your hospital admission.    Call the office if you have: (1) Fevers to more than 101F, (2) Unusual chest or leg pain, (3) Drainage or fluid from incision that may be foul smelling, increased tenderness or soreness at the wound or the wound edges are no longer together, redness or swelling at the incision site. Do not hesitate to call with any other questions.    TIME SPENT ON DISCHARGE: 35 minutes      ____________________________________________  Coreen Jaquez D.N.P.    DD: 8/20/2023 7:35 AM

## 2023-08-20 NOTE — CARE PLAN
The patient is Stable - Low risk of patient condition declining or worsening    Shift Goals  Clinical Goals: pain control, ambulation  Patient Goals: rest, pain control    Progress made toward(s) clinical / shift goals:   PRN medication administered for complaints of pain with relief.  Patient able to ambulate 500ft. In the hallway with no complaints of pain. Patient verbalized understanding  on discharge plan.      Patient is not progressing towards the following goals: N/A      Problem: Pain - Standard  Goal: Alleviation of pain or a reduction in pain to the patient’s comfort goal  Outcome: Progressing     Problem: Knowledge Deficit - Standard  Goal: Patient and family/care givers will demonstrate understanding of plan of care, disease process/condition, diagnostic tests and medications  Outcome: Progressing

## 2023-08-20 NOTE — ED PROVIDER NOTES
ED Provider Note    CHIEF COMPLAINT  Chief Complaint   Patient presents with    RUQ Pain     Patient to triage via wheelchair reports having RUQ pain since Friday, worsening over the last few days. Worst its been today. Patient reports that pain worsens each time she eats. Still has gallbladder. Patient reports nausea, denies vomiting and diarrhea.          HPI/ARACELIS Nieto is a 33 y.o. female who presents to the emergency department complaining of right upper quadrant abdominal pain.  The patient says that since Friday she has been having episodes of fairly severe right upper quadrant pain the first episode occurred after eating pizza and subsequent episodes happen after she eats any kind of solid food.  She is able to take sips of fluids and water without precipitating the symptoms but she is now afraid to eat because it is so uncomfortable afterwards.  The pain is in the right upper quadrant and radiates to her back.  She does not recognize any other exacerbating or alleviating factors or precipitating events.  She has noticed anita colored stools over the last couple of days.  Review of systems: No fever or chills no chest pain cough or difficulty breathing no black or bloody bowel movements no urinary symptoms no pain no burning no frequency or urgency.    PAST MEDICAL HISTORY   has a past medical history of Seizures (HCC).    SURGICAL HISTORY  patient denies any surgical history    FAMILY HISTORY  No family history on file.    SOCIAL HISTORY  Social History     Tobacco Use    Smoking status: Never    Smokeless tobacco: Never   Vaping Use    Vaping Use: Never used   Substance and Sexual Activity    Alcohol use: Never    Drug use: Never    Sexual activity: Not on file       CURRENT MEDICATIONS  Home Medications       Reviewed by Ladonna De La Torre (Pharmacy Tech) on 08/19/23 at 1818  Med List Status: Complete     Medication Last Dose Status   ibuprofen (ADVIL) 200 MG Tab 8/19/2023 Active  "  lamoTRIgine (LAMICTAL) 100 MG Tab 8/19/2023 Active   oxyCODONE-Acetaminophen (PERCOCET PO) 8/19/2023 Active                    ALLERGIES  Allergies   Allergen Reactions    Keppra [Levetiracetam] Unspecified     \"Increased seizures\" per patient       PHYSICAL EXAM  VITAL SIGNS: /51   Pulse 60   Temp 36.3 °C (97.3 °F) (Temporal)   Resp 18   Ht 1.651 m (5' 5\")   Wt 81.2 kg (179 lb)   LMP 08/01/2023 (Approximate)   SpO2 98%   BMI 29.79 kg/m²    Constitutional: Awake lucid verbal very pleasant individual she does not appear toxic  Eyes: No erythema discharge or jaundice  Neck: Trachea midline no JVD  Cardiovascular: Regular rate and rhythm  Respiratory: Clear bilaterally with no apparent difficulty breathing  Abdomen: Soft and nondistended the patient is clearly tender in the right upper quadrant but otherwise does not have peritoneal findings  Skin: Warm and dry  Musculoskeletal: No acute bony deformity  Neurologic: Awake lucid verbal moving all extremities without difficulty      DIAGNOSTIC STUDIES / PROCEDURES  LABS  ECG shows white blood cell count of 9.6 hemoglobin is adequate at 11 basic metabolic panel shows a slightly low bicarb of 16 liver functions showed some slight elevations with AST of 85 ALT of 96 alk phos 441 total bilirubin 1.6 the lipase is normal at 44.  Pregnancy test is negative urinalysis did show moderate leukocyte Estrace with 5-10 white blood cells and a few bacteria but as noted above the patient has no urinary symptoms.    RADIOLOGY  Radiologist interpretation:   US-RUQ   Final Result      1.  Stones and sludge within the gallbladder      2.  No sonographic evidence for cholecystitis and there is no biliary dilation            COURSE & MEDICAL DECISION MAKING  In the emergency department the patient was given intravenous morphine and Zofran and kept NPO.  These measures were quite helpful.  I have reviewed all the findings with the patient and I have reviewed the case with  " Ky Ward and the patient will be admitted to Dr. Ward's service for further evaluation and treatment.    FINAL DIAGNOSIS  1. Symptomatic cholelithiasis    2. Elevated LFTs           Electronically signed by: Curtis Catherine M.D., 8/19/2023 7:43 PM

## 2023-08-20 NOTE — DISCHARGE INSTRUCTIONS
Post Operative Discharge Instructions:    1. DIET: Upon discharge from the hospital, you may resume your normal preoperative diet, unless specifically directed otherwise. Depending on how you are feeling and whether you have nausea or not, you may wish to stay with a bland diet for the first few days. However, you can advance this as quickly as you feel ready.    2. ACTIVITIES: After discharge from the hospital, you may resume full routine activities; however, there should be no heavy lifting (greater than 20 pounds or a bag of groceries) and no strenuous activities for at least 2 weeks. The duration may be longer, depending on your surgical procedure. Routine activities of daily living are acceptable. Activity level should be addressed at your post-op follow up appointment.    3. DRIVING: You may drive whenever you are off pain medications and are able to perform the activities needed to drive, i.e., turning, bending, twisting, etc.    4. BATHING: You may get the wound wet at any time after leaving the hospital. You may shower, but do not submerge in a bath for at least two weeks.  If you have wound dressings, they may come off after 48 hours.  If you have skin glue to the wound, this will fall off on its own, do not pick at it.  If you have Steri-Strips to the wound, these will fall off on their own, do not pick at them. You may trim the edges if needed.    5. BOWEL FUNCTION:   After surgery, it is not uncommon for patients to develop either frequent or loose stools after meals. This usually corrects itself after a few days, to a few weeks. If this occurs, do not worry; this will resolve on its own.  Constipation is much more common than loose stools. The cause is the combination of pain medication and decreased activity level and possibly the nature of the surgical procedure performed. If you feel this is occurring, you may use an over-the-counter treatment such as MiraLAX (or Milk of Magnesia, Ex-Lax,  Senokot, etc.) until the problem has resolved. Drink plenty of water and try to wean off narcotic pain medications as soon as is comfortable for you.    6. PAIN MEDICATION:   You will be given a prescription for pain medication at discharge. Please take these as directed. It is important to remember not to take medications on an empty stomach as this may cause nausea.  You may also take over the counter acetaminophen and/or NSAIDS (ibuprofen, Aleve, Advil, Motrin) per the package instructions.  You may also use ice to the wound to decrease pain and swelling. You may alternate 20 minutes on and 20 minutes off with the ice for the first 24-48 hours. Make sure you place a washcloth or towel between the ice pack and your skin.  Please note that narcotic pain medication cannot be refilled unless you are seen by a doctor. Make sure you call the office if you are running low on medication or if the dose you have been prescribed is not working well for you.    7.CALL THE [facility] AT [phone] IF YOU HAVE:  (1) Fevers to more than 101F, (2) Unusual chest or leg pain, (3) Drainage or fluid from incision that may be foul smelling, increased tenderness or soreness at the wound or the wound edges are no longer together, redness or swelling at the incision site. Do not hesitate to call with any other questions.

## 2023-08-20 NOTE — ANESTHESIA POSTPROCEDURE EVALUATION
Patient: Yenni Nieto    Procedure Summary     Date: 08/19/23 Room / Location: [place] [address] / SURGERY [facility]    Anesthesia Start: 2011 Anesthesia Stop: 2127    Procedure: CHOLECYSTECTOMY, LAPAROSCOPIC (Abdomen) Diagnosis: (ACUTE CHOLECYSTITIS)    Surgeons: Ky Ward M.D. Responsible Provider: Tyshawn Fuentes D.O.    Anesthesia Type: general ASA Status: 2          Final Anesthesia Type: general  Last vitals  BP   Blood Pressure: 107/51    Temp   36.3 °C (97.3 °F)    Pulse   60   Resp   18    SpO2   98 %      Anesthesia Post Evaluation    Patient location during evaluation: PACU  Patient participation: complete - patient participated  Level of consciousness: awake and alert  Pain score: 6    Airway patency: patent  Anesthetic complications: no  Cardiovascular status: hemodynamically stable  Respiratory status: acceptable  Hydration status: euvolemic    PONV: none          No notable events documented.     Nurse Pain Score: 0 (NPRS)

## 2023-08-20 NOTE — ANESTHESIA PREPROCEDURE EVALUATION
Case: [id number] Date/Time: 08/19/23 1939    Procedures:       CHOLECYSTECTOMY, LAPAROSCOPIC      CHOLECYSTECTOMY    Location: [place] [address] / SURGERY [facility]    Surgeons: Ky Ward M.D.          Relevant Problems   No relevant active problems       Physical Exam    Airway   Mallampati: II  TM distance: >3 FB  Neck ROM: full       Cardiovascular - normal exam  Rhythm: regular  Rate: normal  (-) murmur     Dental - normal exam           Pulmonary - normal exam  Breath sounds clear to auscultation     Abdominal    Neurological - normal exam                 Anesthesia Plan    ASA 2       Plan - general       Airway plan will be ETT          Induction: intravenous    Postoperative Plan: Postoperative administration of opioids is intended.    Pertinent diagnostic labs and testing reviewed    Informed Consent:    Anesthetic plan and risks discussed with patient.    Use of blood products discussed with: patient whom consented to blood products.

## 2023-08-20 NOTE — CARE PLAN
The patient is Watcher - Medium risk of patient condition declining or worsening    Shift Goals  Clinical Goals: rest, pain control, OOB activity  Patient Goals: rest, pain control    Progress made toward(s) clinical / shift goals:  Patient was able to rest intermittently overnight. Patient was able to ambulate with SBA overnight. Patient reported pain to be managed with PRN and scheduled medications. Educated on pharmacological and non pharmacological modalities.    Patient is not progressing towards the following goals:

## 2023-08-20 NOTE — PROGRESS NOTES
POD 1 Laparoscopic cholecystectomy.    Appropriate surgical incision pain.  Tolerating diet.  Ambulating.  Tbili 1.1.    A/Ox4.   Respiratory rate even and unlabored.  Abdomen soft with appropriate tenderness at surgical sites.  Surgical sites well approximated with dermabond.    Cleared for discharge home.    Discussed prescriptions and follow up appts with patient. All questions answered.

## 2023-08-20 NOTE — ED NOTES
Dr. Ward at bedside.     Report to pre-op RN.     Pt to pre-op via pt transport. Pt has all belongings at time of transfer including cell phone, phone , purse, water bottle, and clothing. No belongings left in room after transfer.

## 2023-08-20 NOTE — ED NOTES
Med rec completed per patient at bedside.  Allergies reviewed with patient.  No outpatient antibiotics within the last 30 days.  Patient's preferred pharmacy: [facility].    Patient states that she had previous RX for oxycodone-acetaminophen which she filled at [facility] on [address]. Patient unable to recall strength. Unable to verify dispense history with [facility] as the pharmacy is closed at this time and does not reopen until Monday.  search ran by [facility] pharmacist does not return any results for controlled substances for this patient within the state of [place]. UNABLE to verify oxycodone-acetaminophen reported by patient. Patient states that she took 1 tablet of the oxycodone-acetaminophen this morning due to pain, and that she has not used this medication any other time recently.

## 2023-08-20 NOTE — ANESTHESIA PROCEDURE NOTES
Airway    Date/Time: 8/19/2023 8:17 PM    Performed by: Tyshawn Fuentes D.O.  Authorized by: Tyshawn Fuentes D.O.    Location:  OR  Urgency:  Elective  Difficult Airway: No    Indications for Airway Management:  Anesthesia      Spontaneous Ventilation: absent    Sedation Level:  Deep  Preoxygenated: Yes    Patient Position:  Sniffing  Mask Difficulty Assessment:  2 - vent by mask + OA or adjuvant +/- NMBA  Final Airway Type:  Endotracheal airway  Final Endotracheal Airway:  ETT  Cuffed: Yes    Technique Used for Successful ETT Placement:  Direct laryngoscopy    Insertion Site:  Oral  Blade Type:  Silas  Laryngoscope Blade/Videolaryngoscope Blade Size:  3  ETT Size (mm):  7.0  Measured from:  Teeth  ETT to Teeth (cm):  21  Placement Verified by: auscultation and capnometry    Cormack-Lehane Classification:  Grade IIa - partial view of glottis  Number of Attempts at Approach:  1

## 2023-08-20 NOTE — H&P
"    CHIEF COMPLAINT: Right upper quadrant pain, cholelithiasis, and gallbladder sludge     HISTORY OF PRESENT ILLNESS: The patient is a 33 year-old  woman who presents to the Emergency Department a 2 - day history of moderate right upper quadrant  abdominal pain. The pain is associated with nausea, vomiting, anorexia, and diarrhea. She reports intermittent precipitation and exacerbation of symptoms with ingestion of fatty foods.  The patient has has weight loss of > 50 lbs due to dieting. The patient denies any recent or intercurrent illness. The patient denies any recent or intercurrent illness. denies any history of ulcer, gastritis, cholangitis, pancreatitis, hepatitis, or previous abdominal ailments. The patient denies any history of previous abdominal surgery.    PAST MEDICAL HISTORY:  has a past medical history of Seizures (HCC).    PAST SURGICAL HISTORY:  has no past surgical history on file.    ALLERGIES: No Known Allergies    CURRENT MEDICATIONS:    Home Medications       Reviewed by Ladonna De La Torre (Pharmacy Tech) on 08/19/23 at 1818  Med List Status: Complete     Medication Last Dose Status   ibuprofen (ADVIL) 200 MG Tab 8/19/2023 Active   lamoTRIgine (LAMICTAL) 100 MG Tab 8/19/2023 Active   oxyCODONE-Acetaminophen (PERCOCET PO) 8/19/2023 Active                    FAMILY HISTORY: family history is not on file.    SOCIAL HISTORY:  reports that she has never smoked. She has never used smokeless tobacco. She reports that she does not drink alcohol and does not use drugs.    REVIEW OF SYSTEMS: Comprehensive review of systems is negative with the exception of the aforementioned HPI, PMH, and PSH bullets in accordance with CMS guidelines.    PHYSICAL EXAMINATION:      Constitutional:     Vital Signs: BP 96/53   Pulse (!) 53   Temp 36.7 °C (98 °F) (Temporal)   Resp 16   Ht 1.651 m (5' 5\")   Wt 79.1 kg (174 lb 6.1 oz)   SpO2 96%    General Appearance: appears stated age, is well developed and " well nourished.  HEENT: The pupils are equal, round, and reactive to light bilaterally. The extraocular muscles are intact bilaterally.. The sclera are anicteric. Nares and oropharynx are clear.   Neck: Supple. No adenopathy.  Respiratory:   Inspection: Unlabored respirations, no intercostal retractions, paradoxical motion, or accessory muscle use.   Auscultation: normal.  Cardiovascular:   Inspection: The skin is warm, dry, and well purfused.  Auscultation: Regular rate and rhythm.   Peripheral Pulses: Normal.   Abdomen:  Inspection: Abdominal inspection reveals no abrasions, contusions, lacerations or penetrating wounds.   Palpation: Palpation is remarkable for mild tenderness in the right upper quadrant  region. No abdominal wall hernias.  Extremities:   Examination of the upper and lower extremities demonstrates no cyanosis edema or clubbing.  Neurologic:   Alert & oriented to person, time and place. Normal motor function. Normal sensory function. No focal deficits noted.    LABORATORY VALUES:   Recent Labs     08/19/23  1417   WBC 9.6   RBC 5.39   HEMOGLOBIN 11.0*   HEMATOCRIT 35.7*   MCV 66.2*   MCH 20.4*   MCHC 30.8*   RDW 40.8   PLATELETCT 219   MPV 10.1     Recent Labs     08/19/23  1417   SODIUM 139   POTASSIUM 3.4*   CHLORIDE 105   CO2 16*   GLUCOSE 113*   BUN 9   CREATININE 0.53   CALCIUM 9.8     Recent Labs     08/19/23  1417   ASTSGOT 85*   ALTSGPT 176*   TBILIRUBIN 1.6*   ALKPHOSPHAT 441*   GLOBULIN 3.0            IMAGING:   US-RUQ   Final Result      1.  Stones and sludge within the gallbladder      2.  No sonographic evidence for cholecystitis and there is no biliary dilation          ASSESSMENT AND PLAN:     Acute cholecystitis- (present on admission)  Assessment & Plan  RUQ x days  Unable to eat due to pain  Elevated LFTs, T bili 1.6  U/S shows: The gallbladder contains stones and sludge. There is no pericholecystic fluid.  The common duct measures 5.10 mm        The patient has Grade I (mild)  Right upper quadrant pain, cholelithiasis, and gallbladder sludge. Plan: Laparoscopic cholecystectomy.    The patient will be taken to the operating room for Laparoscopic cholecystectomy and possible open cholecystectomy, possible cholangiogram . The surgical conduct was discussed in detail. Potential complications including, but not limited to, infection, bleeding, damage to adjacent structures, bile duct injury, need to convert to an open procedure, and anesthetic complications were discussed. Operative consent signed.     DISPOSITION: Admit [facility] Emergency Department Surgery [facility] Service.     ____________________________________     Ky Ward M.D.    DD: 8/19/2023  6:18 PM    [place] Guidelines for Acute Cholecystitis 2018  AAST Grading System for EGS Conditions  ACS NSQIP Surgical Risk Calculator

## 2023-08-20 NOTE — ANESTHESIA TIME REPORT
Anesthesia Start and Stop Event Times     Date Time Event    8/19/2023 1949 Ready for Procedure     2011 Anesthesia Start     2127 Anesthesia Stop        Responsible Staff  08/19/23    Name Role Begin End    Tyshawn Fuentes D.O. Anesth 2011 2127        Overtime Reason:  no overtime (within assigned shift)    Comments:

## 2023-08-20 NOTE — OP REPORT
DATE OF OPERATION:   8/19/2023     PREOPERATIVE DIAGNOSIS: Right upper quadrant pain, cholelithiasis, and acute cholecystitis.    POSTOPERATIVE DIAGNOSIS: Right upper quadrant pain, cholelithiasis, and acute cholecystitis.    PROCEDURE PERFORMED: Laparoscopic cholecystectomy    SURGEON:    Ky Ward M.D.    ASSISTANT:    DARSHANA Soler.     ANESTHESIOLOGIST:  Tyshawn Fuentes D.O.    ANESTHESIA:   General endotracheal anesthesia.    ASA CLASSIFICATION:  II.    INDICATIONS: The patient is a 33 year-old woman with clinical and radiographic findings of Right upper quadrant pain, cholelithiasis, and acute cholecystitis. She is taken to the operating room for a laparoscopic cholecystectomy.     The nature of the surgical procedure warranted additional skilled operative assistance from an Advanced Registered Nurse Practitioner (ARNP). The assistant was present during the entire operation. The surgical assistant performed the following: provided assistance with optimal surgical exposure of the operative field, provided high complexity, subspecialty decision making input, operated the camera for the laparoscopic portion of the procedure, assisted with the surgical resection, assisted with the fascial closure, and performed the skin closure and dressing application.     FINDINGS: Mild inflammation of the gallbladder. Fatty appearing liver. Multiple small galstones.    WOUND CLASSIFICATION:  Class III, Contaminated.    SPECIMEN:    Gallbladder.    ESTIMATED BLOOD LOSS:  20 mL.    PROCEDURE: Following informed consent consent, the patient was properly identified, taken to the operating room and placed in supine position where general endotracheal anesthesia was administered. Intravenous antibiotics were administered by the anesthesiologist in correct time interval. The patient voided prior to surgery. A urinary catheter was not placed. Sequential compression devices were employed. The abdomen was prepped and  draped into a sterile field. A timeout was conducted with the full attention and participation of all operating room personnel.    Marcaine 0.5% was used to infiltrate the port sites. A supraumbilical incision was made and the subcutaneous tissues spread bluntly. The  fascia was incised and a 5 mm port without a trocar was inserted.  Carbon dioxide pneumoperitoneum was instilled.  A 5 mm 30 degree lens and camera was passed into the peritoneal cavity. An 11 mm port was placed in the epigastric midline under direct vision. Two 5 mm right subcostal ports were placed under direct vision.     The gallbladder was identified and elevated. Dissection was carried out to completely expose and delineate the hepatocystic triangle. The critical view was achieved definitively identifying the single cystic duct and single cystic artery entering the gallbladder. These structures were multiply clipped proximally, once distally and divided. The gallbladder was dissected free from the undersurface of the liver using electrocautery and placed within a laparoscopic specimen retrieval bag. The gallbladder was delivered intact from the abdominal cavity and submitted for pathology.  The gallbladder fossa was irrigated. All excess irrigant was evacuated from the abdominal cavity.  The gallbladder fossa was inspected. Hemostasis was satisfactory. The gallbladder fossa was inspected. Hemostasis was controlled with electrocautery.    The epigastric port site fascia was approximated using a trocar site closure device with a 2-0 VICRYL® Plus Antibacterial suture. The abdomen was desufflated and the ports were removed.  The port site skin incisions were closed with interrupted 4-0 VICRYL® Plus Antibacterial subcuticular sutures. A DERMABOND ADVANCED® Topical Skin Adhesive dressing was applied.    The patient tolerated the procedure well, and there were no apparent complications. All sponge, needle, and instrument counts were correct on 2  separate occasions. The patient was awakened, extubated, and transferred to  to the post anesthesia care unit (PACU) in satisfactory condition.       ____________________________________     Ky Ward M.D.    DD: 8/19/2023  9:28 PM

## 2023-08-20 NOTE — PROGRESS NOTES
4 Eyes Skin Assessment Completed by JAY JAY Cantor and JAY JAY Valle.    Head WDL  Ears WDL  Nose WDL  Mouth WDL  Neck WDL  Breast/Chest WDL  Shoulder Blades WDL  Spine WDL  (R) Arm/Elbow/Hand WDL  (L) Arm/Elbow/Hand WDL  Abdomen x4 lap sites   Groin WDL  Scrotum/Coccyx/Buttocks WDL  (R) Leg WDL  (L) Leg WDL  (R) Heel/Foot/Toe WDL  (L) Heel/Foot/Toe WDL    Devices In Places Blood Pressure Cuff, SCD's, and Oxy Mask    Interventions In Place Gray Ear Foams, Pillows, Heels Loaded W/Pillows, and Pressure Redistribution Mattress    Possible Skin Injury No    Pictures Uploaded Into Epic N/A  Wound Consult Placed N/A  RN Wound Prevention Protocol Ordered No

## 2023-08-20 NOTE — ASSESSMENT & PLAN NOTE
RUQ x days  Unable to eat due to pain  Elevated LFTs, T bili 1.6  U/S shows: The gallbladder contains stones and sludge. There is no pericholecystic fluid.  The common duct measures 5.10 mm  8/19 Laparoscopic cholecystectomy

## 2023-08-20 NOTE — PROGRESS NOTES
Bedside report received.  Assessment complete.  A&O x 4. Patient calls appropriately.  Patient ambulates with standby assist. Patient has 6/10 pain. Pain managed with prescribed medications.  Denies N&V. Tolerating regular diet.  Surgical x4 lap sites, dermabond, CHRISTINA.  + void, - flatus, Last BM was PTA.  Patient denies SOB.  SCD's in place.  Patient is cooperative and compliant with plan of  care.  Review plan with of care with patient. Call light and personal belongings within reach. Hourly rounding in place. All needs met at this time.

## 2023-08-20 NOTE — OR NURSING
2125  Patient arrived from OR via gurney siderails up, brake locked. Oral airway in place. Received report from Dr. Fuentes and Veronica ZAMUDIO RN, assumed care of patient. VSS stable. Four abdominal trocar sites with Dermabond CDI. Patient appears comfortable, no s/s pain or nausea. Orders reviewed.  2133  Rouses to voice, oral airway removed.  Denies pain, Zofran administered for reported nausea.  2143  Fentanyl administered for pain.  2146 Haldol administered for continued report of nausea.  2155  Nausea improved, tolerating sips of water. Oxycodone and Dilaudid administered for continued report of pain.  2210  Family updated.  2240 Patient continues to report pain, medicated per MAR. 2250  Patient resting comfortably, reports pain improved and tolerable. VSS. Abdominal sites remain CDI.  Recovery complete. 2253 Report to Steffi BRAN RN. 2302  Discharged to room with belongings and oxygen.

## 2023-08-20 NOTE — PROGRESS NOTES
"Pt admitted to room T432 via transport in [facility] from PACU at 2310.  Patient reporting 9/10 pain at this time. No PRNs available. Paged on call ACS Cailin GILLILAND for orders.  Oriented to room call light and smoking policy.  Reviewed plan of care (equipment, incentive spirometer, sequential compression devices, medications, activity, diet, fall precautions, skin care, and pain) with patient and family. Welcome packet given and reviewed, all questions answered. Education provided on oral hygiene program.      /69   Pulse 69   Temp 36.6 °C (97.9 °F) (Temporal)   Resp 17   Ht 1.651 m (5' 5\")   Wt 81.2 kg (179 lb)   LMP 08/01/2023 (Approximate)   SpO2 97%   BMI 29.79 kg/m²     AA&Ox4. Denies CP/SOB.  See 2 RN skin note  Tolerating regular diet. Denies N/V at this time.   + void. - BM. Last BM PTA  Pt ambulates with SBA.  All needs met at this time. Call light within reach. Pt calls appropriately. Bed low and locked, non skid socks in place. Hourly rounding in place.   "

## 2023-08-21 LAB — PATHOLOGY CONSULT NOTE: NORMAL

## 2023-08-25 ENCOUNTER — HOSPITAL ENCOUNTER (OUTPATIENT)
Facility: MEDICAL CENTER | Age: 33
End: 2023-08-27
Attending: EMERGENCY MEDICINE | Admitting: INTERNAL MEDICINE

## 2023-08-25 ENCOUNTER — APPOINTMENT (OUTPATIENT)
Dept: RADIOLOGY | Facility: MEDICAL CENTER | Age: 33
End: 2023-08-25
Attending: EMERGENCY MEDICINE

## 2023-08-25 DIAGNOSIS — D50.9 IRON DEFICIENCY ANEMIA, UNSPECIFIED IRON DEFICIENCY ANEMIA TYPE: ICD-10-CM

## 2023-08-25 DIAGNOSIS — R74.01 TRANSAMINITIS: ICD-10-CM

## 2023-08-25 DIAGNOSIS — R10.9 ABDOMINAL PAIN, UNSPECIFIED ABDOMINAL LOCATION: ICD-10-CM

## 2023-08-25 PROBLEM — R74.8 ELEVATED ALKALINE PHOSPHATASE LEVEL: Status: ACTIVE | Noted: 2023-08-25

## 2023-08-25 PROBLEM — F31.9 BIPOLAR DISORDER (HCC): Status: ACTIVE | Noted: 2023-08-25

## 2023-08-25 LAB
ALBUMIN SERPL BCP-MCNC: 4.6 G/DL (ref 3.2–4.9)
ALBUMIN/GLOB SERPL: 1.4 G/DL
ALP SERPL-CCNC: 1033 U/L (ref 30–99)
ALT SERPL-CCNC: 233 U/L (ref 2–50)
ANION GAP SERPL CALC-SCNC: 16 MMOL/L (ref 7–16)
APPEARANCE UR: CLEAR
AST SERPL-CCNC: 117 U/L (ref 12–45)
BASOPHILS # BLD AUTO: 0.3 % (ref 0–1.8)
BASOPHILS # BLD: 0.03 K/UL (ref 0–0.12)
BILIRUB SERPL-MCNC: 1 MG/DL (ref 0.1–1.5)
BILIRUB UR QL STRIP.AUTO: NEGATIVE
BUN SERPL-MCNC: 8 MG/DL (ref 8–22)
CALCIUM ALBUM COR SERPL-MCNC: 9.5 MG/DL (ref 8.5–10.5)
CALCIUM SERPL-MCNC: 10 MG/DL (ref 8.5–10.5)
CHLORIDE SERPL-SCNC: 104 MMOL/L (ref 96–112)
CO2 SERPL-SCNC: 20 MMOL/L (ref 20–33)
COLOR UR: YELLOW
CREAT SERPL-MCNC: 0.5 MG/DL (ref 0.5–1.4)
EOSINOPHIL # BLD AUTO: 0.09 K/UL (ref 0–0.51)
EOSINOPHIL NFR BLD: 0.9 % (ref 0–6.9)
ERYTHROCYTE [DISTWIDTH] IN BLOOD BY AUTOMATED COUNT: 45.6 FL (ref 35.9–50)
GFR SERPLBLD CREATININE-BSD FMLA CKD-EPI: 128 ML/MIN/1.73 M 2
GLOBULIN SER CALC-MCNC: 3.2 G/DL (ref 1.9–3.5)
GLUCOSE SERPL-MCNC: 87 MG/DL (ref 65–99)
GLUCOSE UR STRIP.AUTO-MCNC: NEGATIVE MG/DL
HCG SERPL QL: NEGATIVE
HCT VFR BLD AUTO: 33.7 % (ref 37–47)
HGB BLD-MCNC: 10.3 G/DL (ref 12–16)
IMM GRANULOCYTES # BLD AUTO: 0.05 K/UL (ref 0–0.11)
IMM GRANULOCYTES NFR BLD AUTO: 0.5 % (ref 0–0.9)
KETONES UR STRIP.AUTO-MCNC: 40 MG/DL
LEUKOCYTE ESTERASE UR QL STRIP.AUTO: NEGATIVE
LIPASE SERPL-CCNC: 23 U/L (ref 11–82)
LYMPHOCYTES # BLD AUTO: 2.07 K/UL (ref 1–4.8)
LYMPHOCYTES NFR BLD: 21.8 % (ref 22–41)
MCH RBC QN AUTO: 21.1 PG (ref 27–33)
MCHC RBC AUTO-ENTMCNC: 30.6 G/DL (ref 32.2–35.5)
MCV RBC AUTO: 68.9 FL (ref 81.4–97.8)
MICRO URNS: ABNORMAL
MONOCYTES # BLD AUTO: 0.56 K/UL (ref 0–0.85)
MONOCYTES NFR BLD AUTO: 5.9 % (ref 0–13.4)
NEUTROPHILS # BLD AUTO: 6.7 K/UL (ref 1.82–7.42)
NEUTROPHILS NFR BLD: 70.6 % (ref 44–72)
NITRITE UR QL STRIP.AUTO: NEGATIVE
NRBC # BLD AUTO: 0 K/UL
NRBC BLD-RTO: 0 /100 WBC (ref 0–0.2)
PH UR STRIP.AUTO: 6 [PH] (ref 5–8)
PLATELET # BLD AUTO: 251 K/UL (ref 164–446)
PMV BLD AUTO: 10 FL (ref 9–12.9)
POTASSIUM SERPL-SCNC: 3.8 MMOL/L (ref 3.6–5.5)
PROT SERPL-MCNC: 7.8 G/DL (ref 6–8.2)
PROT UR QL STRIP: NEGATIVE MG/DL
RBC # BLD AUTO: 4.89 M/UL (ref 4.2–5.4)
RBC UR QL AUTO: NEGATIVE
SODIUM SERPL-SCNC: 140 MMOL/L (ref 135–145)
SP GR UR STRIP.AUTO: 1.01
UROBILINOGEN UR STRIP.AUTO-MCNC: 1 MG/DL
WBC # BLD AUTO: 9.5 K/UL (ref 4.8–10.8)

## 2023-08-25 PROCEDURE — G0378 HOSPITAL OBSERVATION PER HR: HCPCS

## 2023-08-25 PROCEDURE — 36415 COLL VENOUS BLD VENIPUNCTURE: CPT

## 2023-08-25 PROCEDURE — 85025 COMPLETE CBC W/AUTO DIFF WBC: CPT

## 2023-08-25 PROCEDURE — 80053 COMPREHEN METABOLIC PANEL: CPT

## 2023-08-25 PROCEDURE — A9270 NON-COVERED ITEM OR SERVICE: HCPCS | Performed by: INTERNAL MEDICINE

## 2023-08-25 PROCEDURE — 700105 HCHG RX REV CODE 258: Performed by: EMERGENCY MEDICINE

## 2023-08-25 PROCEDURE — 84703 CHORIONIC GONADOTROPIN ASSAY: CPT

## 2023-08-25 PROCEDURE — 96375 TX/PRO/DX INJ NEW DRUG ADDON: CPT

## 2023-08-25 PROCEDURE — 700111 HCHG RX REV CODE 636 W/ 250 OVERRIDE (IP): Performed by: EMERGENCY MEDICINE

## 2023-08-25 PROCEDURE — 700117 HCHG RX CONTRAST REV CODE 255: Performed by: EMERGENCY MEDICINE

## 2023-08-25 PROCEDURE — 74177 CT ABD & PELVIS W/CONTRAST: CPT

## 2023-08-25 PROCEDURE — 99285 EMERGENCY DEPT VISIT HI MDM: CPT

## 2023-08-25 PROCEDURE — 83690 ASSAY OF LIPASE: CPT

## 2023-08-25 PROCEDURE — 700102 HCHG RX REV CODE 250 W/ 637 OVERRIDE(OP): Performed by: INTERNAL MEDICINE

## 2023-08-25 PROCEDURE — 96374 THER/PROPH/DIAG INJ IV PUSH: CPT

## 2023-08-25 PROCEDURE — 81003 URINALYSIS AUTO W/O SCOPE: CPT

## 2023-08-25 PROCEDURE — 99222 1ST HOSP IP/OBS MODERATE 55: CPT | Performed by: INTERNAL MEDICINE

## 2023-08-25 RX ORDER — POLYETHYLENE GLYCOL 3350 17 G/17G
1 POWDER, FOR SOLUTION ORAL
Status: DISCONTINUED | OUTPATIENT
Start: 2023-08-25 | End: 2023-08-27 | Stop reason: HOSPADM

## 2023-08-25 RX ORDER — PROMETHAZINE HYDROCHLORIDE 25 MG/1
12.5-25 SUPPOSITORY RECTAL EVERY 4 HOURS PRN
Status: DISCONTINUED | OUTPATIENT
Start: 2023-08-25 | End: 2023-08-27 | Stop reason: HOSPADM

## 2023-08-25 RX ORDER — OXYCODONE HYDROCHLORIDE 5 MG/1
5 TABLET ORAL
Status: DISCONTINUED | OUTPATIENT
Start: 2023-08-25 | End: 2023-08-27 | Stop reason: HOSPADM

## 2023-08-25 RX ORDER — DIPHENHYDRAMINE HYDROCHLORIDE 50 MG/ML
25 INJECTION INTRAMUSCULAR; INTRAVENOUS ONCE
Status: COMPLETED | OUTPATIENT
Start: 2023-08-25 | End: 2023-08-25

## 2023-08-25 RX ORDER — ONDANSETRON 2 MG/ML
4 INJECTION INTRAMUSCULAR; INTRAVENOUS ONCE
Status: COMPLETED | OUTPATIENT
Start: 2023-08-25 | End: 2023-08-25

## 2023-08-25 RX ORDER — ACETAMINOPHEN 325 MG/1
650 TABLET ORAL EVERY 6 HOURS PRN
Status: DISCONTINUED | OUTPATIENT
Start: 2023-08-25 | End: 2023-08-27 | Stop reason: HOSPADM

## 2023-08-25 RX ORDER — OXYCODONE HYDROCHLORIDE 10 MG/1
10 TABLET ORAL
Status: DISCONTINUED | OUTPATIENT
Start: 2023-08-25 | End: 2023-08-27 | Stop reason: HOSPADM

## 2023-08-25 RX ORDER — ONDANSETRON 2 MG/ML
4 INJECTION INTRAMUSCULAR; INTRAVENOUS EVERY 4 HOURS PRN
Status: DISCONTINUED | OUTPATIENT
Start: 2023-08-25 | End: 2023-08-27 | Stop reason: HOSPADM

## 2023-08-25 RX ORDER — HYDROMORPHONE HYDROCHLORIDE 1 MG/ML
0.5 INJECTION, SOLUTION INTRAMUSCULAR; INTRAVENOUS; SUBCUTANEOUS
Status: DISCONTINUED | OUTPATIENT
Start: 2023-08-25 | End: 2023-08-27 | Stop reason: HOSPADM

## 2023-08-25 RX ORDER — PROCHLORPERAZINE EDISYLATE 5 MG/ML
5-10 INJECTION INTRAMUSCULAR; INTRAVENOUS EVERY 4 HOURS PRN
Status: DISCONTINUED | OUTPATIENT
Start: 2023-08-25 | End: 2023-08-27 | Stop reason: HOSPADM

## 2023-08-25 RX ORDER — AMOXICILLIN 250 MG
2 CAPSULE ORAL 2 TIMES DAILY
Status: DISCONTINUED | OUTPATIENT
Start: 2023-08-25 | End: 2023-08-27 | Stop reason: HOSPADM

## 2023-08-25 RX ORDER — BISACODYL 10 MG
10 SUPPOSITORY, RECTAL RECTAL
Status: DISCONTINUED | OUTPATIENT
Start: 2023-08-25 | End: 2023-08-27 | Stop reason: HOSPADM

## 2023-08-25 RX ORDER — SODIUM CHLORIDE 9 MG/ML
1000 INJECTION, SOLUTION INTRAVENOUS ONCE
Status: COMPLETED | OUTPATIENT
Start: 2023-08-25 | End: 2023-08-25

## 2023-08-25 RX ORDER — PROMETHAZINE HYDROCHLORIDE 25 MG/1
12.5-25 TABLET ORAL EVERY 4 HOURS PRN
Status: DISCONTINUED | OUTPATIENT
Start: 2023-08-25 | End: 2023-08-27 | Stop reason: HOSPADM

## 2023-08-25 RX ORDER — LAMOTRIGINE 100 MG/1
100 TABLET ORAL 2 TIMES DAILY
Status: DISCONTINUED | OUTPATIENT
Start: 2023-08-25 | End: 2023-08-27 | Stop reason: HOSPADM

## 2023-08-25 RX ORDER — ONDANSETRON 4 MG/1
4 TABLET, ORALLY DISINTEGRATING ORAL EVERY 4 HOURS PRN
Status: DISCONTINUED | OUTPATIENT
Start: 2023-08-25 | End: 2023-08-27 | Stop reason: HOSPADM

## 2023-08-25 RX ORDER — MORPHINE SULFATE 4 MG/ML
4 INJECTION INTRAVENOUS ONCE
Status: COMPLETED | OUTPATIENT
Start: 2023-08-25 | End: 2023-08-25

## 2023-08-25 RX ADMIN — ONDANSETRON 4 MG: 2 INJECTION INTRAMUSCULAR; INTRAVENOUS at 17:50

## 2023-08-25 RX ADMIN — DIPHENHYDRAMINE HYDROCHLORIDE 25 MG: 50 INJECTION, SOLUTION INTRAMUSCULAR; INTRAVENOUS at 20:01

## 2023-08-25 RX ADMIN — IOHEXOL 100 ML: 350 INJECTION, SOLUTION INTRAVENOUS at 20:45

## 2023-08-25 RX ADMIN — SODIUM CHLORIDE 1000 ML: 9 INJECTION, SOLUTION INTRAVENOUS at 17:50

## 2023-08-25 RX ADMIN — LAMOTRIGINE 100 MG: 100 TABLET ORAL at 23:08

## 2023-08-25 RX ADMIN — MORPHINE SULFATE 4 MG: 4 INJECTION, SOLUTION INTRAMUSCULAR; INTRAVENOUS at 17:50

## 2023-08-25 ASSESSMENT — ENCOUNTER SYMPTOMS
CHILLS: 0
DOUBLE VISION: 0
PALPITATIONS: 0
FALLS: 0
HALLUCINATIONS: 0
VOMITING: 0
COUGH: 0
SEIZURES: 0
SORE THROAT: 0
NAUSEA: 1
FEVER: 0
DIARRHEA: 0
SHORTNESS OF BREATH: 0
HEADACHES: 0
ABDOMINAL PAIN: 1
BLURRED VISION: 0
BACK PAIN: 0

## 2023-08-25 ASSESSMENT — PAIN DESCRIPTION - PAIN TYPE
TYPE: ACUTE PAIN
TYPE: ACUTE PAIN

## 2023-08-25 ASSESSMENT — PATIENT HEALTH QUESTIONNAIRE - PHQ9
SUM OF ALL RESPONSES TO PHQ9 QUESTIONS 1 AND 2: 0
2. FEELING DOWN, DEPRESSED, IRRITABLE, OR HOPELESS: NOT AT ALL
1. LITTLE INTEREST OR PLEASURE IN DOING THINGS: NOT AT ALL

## 2023-08-25 ASSESSMENT — PAIN DESCRIPTION - DESCRIPTORS: DESCRIPTORS: BURNING

## 2023-08-25 ASSESSMENT — FIBROSIS 4 INDEX: FIB4 SCORE: 3.04

## 2023-08-25 ASSESSMENT — LIFESTYLE VARIABLES
DO YOU DRINK ALCOHOL: NO
SUBSTANCE_ABUSE: 0

## 2023-08-25 NOTE — ED TRIAGE NOTES
Chief Complaint   Patient presents with    Abdominal Pain     Pt had cholecystectomy on 8/19 and had f/u appointment for worsening abdominal pain with [facility].  She was late to the appointment and they told her to come here since they could not see her today.     Pt reports her pain started after eating dinner yesterday.  Pt reports the pain is the same kind of pain she would have before her surgery, not related to surgical site.    Laprascopic sites intsat, with no s/s of infection.

## 2023-08-26 ENCOUNTER — APPOINTMENT (OUTPATIENT)
Dept: RADIOLOGY | Facility: MEDICAL CENTER | Age: 33
End: 2023-08-26
Attending: INTERNAL MEDICINE

## 2023-08-26 PROBLEM — D64.9 ACUTE ANEMIA: Status: ACTIVE | Noted: 2023-08-26

## 2023-08-26 PROBLEM — R74.8 ELEVATED LIVER ENZYMES: Status: ACTIVE | Noted: 2023-08-26

## 2023-08-26 LAB
ALBUMIN SERPL BCP-MCNC: 3.8 G/DL (ref 3.2–4.9)
ALBUMIN/GLOB SERPL: 1.6 G/DL
ALP SERPL-CCNC: 867 U/L (ref 30–99)
ALT SERPL-CCNC: 176 U/L (ref 2–50)
ANION GAP SERPL CALC-SCNC: 12 MMOL/L (ref 7–16)
AST SERPL-CCNC: 83 U/L (ref 12–45)
BASOPHILS # BLD AUTO: 0.5 % (ref 0–1.8)
BASOPHILS # BLD: 0.04 K/UL (ref 0–0.12)
BILIRUB SERPL-MCNC: 0.7 MG/DL (ref 0.1–1.5)
BUN SERPL-MCNC: 7 MG/DL (ref 8–22)
CALCIUM ALBUM COR SERPL-MCNC: 9.1 MG/DL (ref 8.5–10.5)
CALCIUM SERPL-MCNC: 8.9 MG/DL (ref 8.5–10.5)
CHLORIDE SERPL-SCNC: 106 MMOL/L (ref 96–112)
CO2 SERPL-SCNC: 21 MMOL/L (ref 20–33)
CREAT SERPL-MCNC: 0.47 MG/DL (ref 0.5–1.4)
EOSINOPHIL # BLD AUTO: 0.12 K/UL (ref 0–0.51)
EOSINOPHIL NFR BLD: 1.5 % (ref 0–6.9)
ERYTHROCYTE [DISTWIDTH] IN BLOOD BY AUTOMATED COUNT: 45.4 FL (ref 35.9–50)
GFR SERPLBLD CREATININE-BSD FMLA CKD-EPI: 130 ML/MIN/1.73 M 2
GLOBULIN SER CALC-MCNC: 2.4 G/DL (ref 1.9–3.5)
GLUCOSE SERPL-MCNC: 81 MG/DL (ref 65–99)
HCT VFR BLD AUTO: 28.2 % (ref 37–47)
HGB BLD-MCNC: 8.5 G/DL (ref 12–16)
IMM GRANULOCYTES # BLD AUTO: 0.03 K/UL (ref 0–0.11)
IMM GRANULOCYTES NFR BLD AUTO: 0.4 % (ref 0–0.9)
LYMPHOCYTES # BLD AUTO: 2.42 K/UL (ref 1–4.8)
LYMPHOCYTES NFR BLD: 30.3 % (ref 22–41)
MAGNESIUM SERPL-MCNC: 1.8 MG/DL (ref 1.5–2.5)
MCH RBC QN AUTO: 20.9 PG (ref 27–33)
MCHC RBC AUTO-ENTMCNC: 30.1 G/DL (ref 32.2–35.5)
MCV RBC AUTO: 69.3 FL (ref 81.4–97.8)
MONOCYTES # BLD AUTO: 0.56 K/UL (ref 0–0.85)
MONOCYTES NFR BLD AUTO: 7 % (ref 0–13.4)
NEUTROPHILS # BLD AUTO: 4.83 K/UL (ref 1.82–7.42)
NEUTROPHILS NFR BLD: 60.3 % (ref 44–72)
NRBC # BLD AUTO: 0 K/UL
NRBC BLD-RTO: 0 /100 WBC (ref 0–0.2)
PHOSPHATE SERPL-MCNC: 3.7 MG/DL (ref 2.5–4.5)
PLATELET # BLD AUTO: 181 K/UL (ref 164–446)
PMV BLD AUTO: 9.5 FL (ref 9–12.9)
POTASSIUM SERPL-SCNC: 3.7 MMOL/L (ref 3.6–5.5)
PROT SERPL-MCNC: 6.2 G/DL (ref 6–8.2)
RBC # BLD AUTO: 4.07 M/UL (ref 4.2–5.4)
SODIUM SERPL-SCNC: 139 MMOL/L (ref 135–145)
WBC # BLD AUTO: 8 K/UL (ref 4.8–10.8)

## 2023-08-26 PROCEDURE — 700111 HCHG RX REV CODE 636 W/ 250 OVERRIDE (IP): Performed by: INTERNAL MEDICINE

## 2023-08-26 PROCEDURE — 99233 SBSQ HOSP IP/OBS HIGH 50: CPT | Performed by: STUDENT IN AN ORGANIZED HEALTH CARE EDUCATION/TRAINING PROGRAM

## 2023-08-26 PROCEDURE — G0378 HOSPITAL OBSERVATION PER HR: HCPCS

## 2023-08-26 PROCEDURE — 96376 TX/PRO/DX INJ SAME DRUG ADON: CPT

## 2023-08-26 PROCEDURE — 96375 TX/PRO/DX INJ NEW DRUG ADDON: CPT

## 2023-08-26 PROCEDURE — 80053 COMPREHEN METABOLIC PANEL: CPT

## 2023-08-26 PROCEDURE — 83735 ASSAY OF MAGNESIUM: CPT

## 2023-08-26 PROCEDURE — 85025 COMPLETE CBC W/AUTO DIFF WBC: CPT

## 2023-08-26 PROCEDURE — 99254 IP/OBS CNSLTJ NEW/EST MOD 60: CPT | Performed by: SPECIALIST

## 2023-08-26 PROCEDURE — A9270 NON-COVERED ITEM OR SERVICE: HCPCS | Performed by: INTERNAL MEDICINE

## 2023-08-26 PROCEDURE — 74181 MRI ABDOMEN W/O CONTRAST: CPT

## 2023-08-26 PROCEDURE — 99024 POSTOP FOLLOW-UP VISIT: CPT | Performed by: NURSE PRACTITIONER

## 2023-08-26 PROCEDURE — 84100 ASSAY OF PHOSPHORUS: CPT

## 2023-08-26 PROCEDURE — 36415 COLL VENOUS BLD VENIPUNCTURE: CPT

## 2023-08-26 PROCEDURE — 700102 HCHG RX REV CODE 250 W/ 637 OVERRIDE(OP): Performed by: INTERNAL MEDICINE

## 2023-08-26 RX ADMIN — OXYCODONE HYDROCHLORIDE 10 MG: 10 TABLET ORAL at 17:49

## 2023-08-26 RX ADMIN — OXYCODONE HYDROCHLORIDE 10 MG: 10 TABLET ORAL at 00:19

## 2023-08-26 RX ADMIN — LAMOTRIGINE 100 MG: 100 TABLET ORAL at 21:59

## 2023-08-26 RX ADMIN — HYDROMORPHONE HYDROCHLORIDE 0.5 MG: 1 INJECTION, SOLUTION INTRAMUSCULAR; INTRAVENOUS; SUBCUTANEOUS at 01:31

## 2023-08-26 RX ADMIN — HYDROMORPHONE HYDROCHLORIDE 0.5 MG: 1 INJECTION, SOLUTION INTRAMUSCULAR; INTRAVENOUS; SUBCUTANEOUS at 18:36

## 2023-08-26 RX ADMIN — OXYCODONE HYDROCHLORIDE 5 MG: 5 TABLET ORAL at 10:25

## 2023-08-26 RX ADMIN — LAMOTRIGINE 100 MG: 100 TABLET ORAL at 10:24

## 2023-08-26 RX ADMIN — SENNOSIDES AND DOCUSATE SODIUM 2 TABLET: 50; 8.6 TABLET ORAL at 18:42

## 2023-08-26 ASSESSMENT — LIFESTYLE VARIABLES
ON A TYPICAL DAY WHEN YOU DRINK ALCOHOL HOW MANY DRINKS DO YOU HAVE: 0
HAVE PEOPLE ANNOYED YOU BY CRITICIZING YOUR DRINKING: NO
TOTAL SCORE: 0
EVER FELT BAD OR GUILTY ABOUT YOUR DRINKING: NO
EVER HAD A DRINK FIRST THING IN THE MORNING TO STEADY YOUR NERVES TO GET RID OF A HANGOVER: NO
CONSUMPTION TOTAL: NEGATIVE
AVERAGE NUMBER OF DAYS PER WEEK YOU HAVE A DRINK CONTAINING ALCOHOL: 0
HAVE YOU EVER FELT YOU SHOULD CUT DOWN ON YOUR DRINKING: NO
HOW MANY TIMES IN THE PAST YEAR HAVE YOU HAD 5 OR MORE DRINKS IN A DAY: 0
ALCOHOL_USE: NO
TOTAL SCORE: 0
TOTAL SCORE: 0
DOES PATIENT WANT TO STOP DRINKING: NO

## 2023-08-26 ASSESSMENT — PAIN DESCRIPTION - PAIN TYPE
TYPE: ACUTE PAIN

## 2023-08-26 ASSESSMENT — COGNITIVE AND FUNCTIONAL STATUS - GENERAL
SUGGESTED CMS G CODE MODIFIER MOBILITY: CH
SUGGESTED CMS G CODE MODIFIER DAILY ACTIVITY: CH
DAILY ACTIVITIY SCORE: 24
MOBILITY SCORE: 24

## 2023-08-26 ASSESSMENT — ENCOUNTER SYMPTOMS
CONSTITUTIONAL NEGATIVE: 1
CARDIOVASCULAR NEGATIVE: 1
RESPIRATORY NEGATIVE: 1
GASTROINTESTINAL NEGATIVE: 1

## 2023-08-26 NOTE — CARE PLAN
The patient is Stable - Low risk of patient condition declining or worsening    Shift Goals  Clinical Goals: Pain Control; MRI Screening; Comfort  Patient Goals: Pain Control; Rest    Progress made toward(s) clinical / shift goals:  Patient medicated per MAR. Non-pharmacologic comfort measures implemented. Safety discussed. Education provided. Ambulation and repositioning encouraged.     Problem: Pain - Standard  Goal: Alleviation of pain or a reduction in pain to the patient’s comfort goal  Outcome: Progressing     Problem: Knowledge Deficit - Standard  Goal: Patient and family/care givers will demonstrate understanding of plan of care, disease process/condition, diagnostic tests and medications  Outcome: Progressing

## 2023-08-26 NOTE — CARE PLAN
The patient is Stable - Low risk of patient condition declining or worsening    Shift Goals  Clinical Goals: pain control, MRI  Patient Goals: pain control, advance diet  Family Goals: not present at this time    Progress made toward(s) clinical / shift goals:  Patient has had MRI exam. Patient is ambulatory without requiring assistance. Patient is tolerating clear liquid diet. Pain has been medicated per MAR.    Patient is not progressing towards the following goals: MRI results are not yet available. Diet has not been advanced. Patient is pending potential MRCP.     Problem: Pain - Standard  Goal: Alleviation of pain or a reduction in pain to the patient’s comfort goal  Outcome: Progressing     Problem: Knowledge Deficit - Standard  Goal: Patient and family/care givers will demonstrate understanding of plan of care, disease process/condition, diagnostic tests and medications  Outcome: Progressing

## 2023-08-26 NOTE — PROGRESS NOTES
"Received report from ER RN at 4490. Pt brought to unit via gurney with transport at 2255.  Assessment complete.  A&O x 4. Patient calls appropriately.  Patient ambulates independently.  Patient has 5/10 pain. Patient declining interventions for pain at this time, pt stating, \"I want to see how bad it'll get after I eat.\" Education provided on pain management, patient again declines interventions at this time.  Denies N&V. Tolerating clear liquid diet.  Skin per flowsheets.  + void, + flatus, + BM PTA.  Patient denies SOB on room air.    Patient pleasant and cooperative throughout assessment.  Reviewed plan of care with patient, pt verbalizes understanding. Call light and personal belongings with in reach. Hourly rounding in place. All needs met at this time.    "

## 2023-08-26 NOTE — CONSULTS
"  DATE OF CONSULTATION:  8/25/2023     REFERRING PHYSICIAN:   Rebekah Rehman M.d    CONSULTING PHYSICIAN:  Callum Arana M.D.     REASON FOR CONSULTATION:  I have been asked by Dr.Gina KANE Rehman M.d to see the patient in surgical consultation for evaluation of abdominal pain .    HISTORY OF PRESENT ILLNESS: Yenni Nieto 33-year-old female recent history Laparoscopic cholecystectomy, performed on 8/19/2023, by Dr. Ky Ward evaluated by the emergency department abdominal pain.  She reports past several days abdominal pain with eating.  She states pain is similar location intensity nature to pain she experienced prior to surgery.  She states pain is sharp intense located in the epigastrium.  States pain associated with eating.  Vomiting or diarrhea.  She reports normal bowel habits.  She states minimal discomfort at the incision sites  Pain occurs daily.  She reports pain previously relieved with pain medication.  Seen in the emergency department as above she reports pain completely resolved with pain medication.     PAST MEDICAL HISTORY:  has a past medical history of Seizures (HCC).    PAST SURGICAL HISTORY:  has a past surgical history that includes simon by laparoscopy (8/19/2023).    ALLERGIES:   Allergies   Allergen Reactions    Keppra [Levetiracetam] Unspecified     \"Increased seizures\" per patient       CURRENT MEDICATIONS:    Home Medications       Reviewed by Jayne Bernal R.N. (Registered Nurse) on 08/25/23 at 1551  Med List Status: Partial     Medication Last Dose Status   acetaminophen (TYLENOL) 500 MG Tab  Active   ibuprofen (ADVIL) 200 MG Tab  Active   lamoTRIgine (LAMICTAL) 100 MG Tab  Active   senna-docusate (PERICOLACE OR SENOKOT S) 8.6-50 MG Tab  Active                    FAMILY HISTORY: family history is not on file.    SOCIAL HISTORY:  reports that she has never smoked. She has never used smokeless tobacco. She reports that she does not drink alcohol and does not use " drugs.    REVIEW OF SYSTEMS: Comprehensive review of systems is negative with the exception of the aforementioned HPI, PMH, and PSH bullets in accordance with CMS guidelines.    PHYSICAL EXAMINATION:    Physical Exam  /61   Pulse 60   Temp 36.6 °C (97.8 °F) (Temporal)   Resp 16   Wt 79.2 kg (174 lb 9.7 oz)   LMP 08/01/2023 (Approximate)   SpO2 97%   BMI 29.06 kg/m²    Awake alert appropriate  Friendly cooperative  Breathing with ease no cough no distress  Regular heart rate skin warm brisk capillary refill palpable pulse  Abdomen soft nondistended none tender no guarding no rebound incisions well approximated    LABORATORY VALUES:   Recent Labs     08/25/23  1606   WBC 9.5   RBC 4.89   HEMOGLOBIN 10.3*   HEMATOCRIT 33.7*   MCV 68.9*   MCH 21.1*   MCHC 30.6*   RDW 45.6   PLATELETCT 251   MPV 10.0     Recent Labs     08/25/23  1606   SODIUM 140   POTASSIUM 3.8   CHLORIDE 104   CO2 20   GLUCOSE 87   BUN 8   CREATININE 0.50   CALCIUM 10.0     Recent Labs     08/25/23  1606   ASTSGOT 117*   ALTSGPT 233*   TBILIRUBIN 1.0   ALKPHOSPHAT 1033*   GLOBULIN 3.2            IMAGING:   CT-ABDOMEN-PELVIS WITH   Final Result         1.  Intra-abdominal free air, within expected limits for recent postop changes. Differential could include viscus perforation in the appropriate clinical setting.   2.  Low-density fluid collection the gallbladder fossa, could represent postop seroma, hematoma, or biloma. Early forming abscess is not radiographically excluded.          ASSESSMENT AND PLAN:   Abdominal pain  Elevated LFTs    Pain episodic at present pain-free  Ongoing evaluation medicine service  Monitor abdominal exam  Follow-up labs  Close monitoring and support    ACS Bhatt will follow       ____________________________________     Callum Arana M.D.    DD: 8/25/2023  10:00 PM    AAST Grading System for EGS Conditions  ACS NSQIP Surgical Risk Calculator

## 2023-08-26 NOTE — ASSESSMENT & PLAN NOTE
Hb 11> 8.5  No active bleeding seen    Continue monitor, transfuse if Hb less than 7  I ordered anemia work-up

## 2023-08-26 NOTE — PROGRESS NOTES
Hospital Medicine Daily Progress Note    Date of Service  8/26/2023    Chief Complaint  Yenni Nieto is a 33 y.o. female admitted 8/25/2023 with abdominal pain    Hospital Course  Yenni Nieto is a 33 y.o. female who presented 8/25/2023 with abdominal pain.  Patient recently had a cholecystectomy on 8/19 and was discharged 8/20.   CT abdomen pelvis demonstrates  Low-density fluid collection the gallbladder fossa, could represent postop seroma, hematoma, or biloma. Early forming abscess is not radiographically excluded..  General surgery was consulted.  MRCP ordered    Interval Problem Update  Reported nausea, pain better, had bowel movement 3 days ago    Hb 11> 8.5  ALT//308> 117/233  > 1033  Normal bilirubin    Bowel management    Total time: I spent greater than 52 minutes for chart review, obtaining history independently, performing medically appropriate examination,  ordering medications, tests, or procedures, referring and communicating with other health care professionals, documenting clinical information in EPIC.  Independently interpreting results and communicating results to patient/family/caregiver,  including unit/floor time, and face-to-face time with the patient as per interval events and assessment and plan above      I have discussed this patient's plan of care and discharge plan at IDT rounds today with Case Management, Nursing, Nursing leadership, and other members of the IDT team.    Consultants/Specialty  general surgery    Code Status  Full Code    Disposition  The patient is not medically cleared for discharge to home or a post-acute facility.      I have placed the appropriate orders for post-discharge needs.    Review of Systems  All 12 systems were reviewed and negative except as mentioned above      Physical Exam  Temp:  [36.1 °C (97 °F)-36.7 °C (98.1 °F)] 36.5 °C (97.7 °F)  Pulse:  [56-93] 68  Resp:  [16-20] 16  BP: ()/(55-90) 113/64  SpO2:  [95 %-99 %]  97 %    Physical Exam  Constitutional:       Appearance: She is ill-appearing.   HENT:      Head: Normocephalic.      Nose: Nose normal.      Mouth/Throat:      Mouth: Mucous membranes are moist.   Eyes:      Extraocular Movements: Extraocular movements intact.      Conjunctiva/sclera: Conjunctivae normal.   Cardiovascular:      Rate and Rhythm: Normal rate and regular rhythm.      Pulses: Normal pulses.      Heart sounds: Normal heart sounds.   Pulmonary:      Effort: Pulmonary effort is normal.      Breath sounds: Normal breath sounds. No wheezing or rales.   Abdominal:      General: Bowel sounds are normal.      Palpations: Abdomen is soft.      Tenderness: There is no abdominal tenderness. There is no guarding.   Musculoskeletal:         General: No swelling or tenderness. Normal range of motion.      Cervical back: Normal range of motion and neck supple.   Skin:     General: Skin is warm.   Neurological:      Mental Status: She is alert and oriented to person, place, and time. Mental status is at baseline.   Psychiatric:         Mood and Affect: Mood normal.         Fluids    Intake/Output Summary (Last 24 hours) at 8/26/2023 1308  Last data filed at 8/25/2023 1850  Gross per 24 hour   Intake 1000 ml   Output --   Net 1000 ml       Laboratory  Recent Labs     08/25/23  1606 08/26/23  0047   WBC 9.5 8.0   RBC 4.89 4.07*   HEMOGLOBIN 10.3* 8.5*   HEMATOCRIT 33.7* 28.2*   MCV 68.9* 69.3*   MCH 21.1* 20.9*   MCHC 30.6* 30.1*   RDW 45.6 45.4   PLATELETCT 251 181   MPV 10.0 9.5     Recent Labs     08/25/23  1606 08/26/23  0047   SODIUM 140 139   POTASSIUM 3.8 3.7   CHLORIDE 104 106   CO2 20 21   GLUCOSE 87 81   BUN 8 7*   CREATININE 0.50 0.47*   CALCIUM 10.0 8.9                   Imaging  CT-ABDOMEN-PELVIS WITH   Final Result         1.  Intra-abdominal free air, within expected limits for recent postop changes. Differential could include viscus perforation in the appropriate clinical setting.   2.  Low-density fluid  collection the gallbladder fossa, could represent postop seroma, hematoma, or biloma. Early forming abscess is not radiographically excluded.      IB-GYVCOXG-I/O    (Results Pending)        Assessment/Plan  * Abdominal pain- (present on admission)  Assessment & Plan  Postoperative abdominal pain following cholecystectomy formed on 8/19  CTAP - Low-density fluid collection the gallbladder fossa, could represent postop seroma, hematoma, or biloma. Early forming abscess is not radiographically excluded.  Surgery consulted  Clear liquid diet  Pain control  MRCP pending    Elevated liver enzymes  Assessment & Plan  Likely related to recent cholecystectomy  MRCP pending  I ordered a.m. CMP    Acute anemia  Assessment & Plan  Hb 11> 8.5  No active bleeding seen    Continue monitor, transfuse if Hb less than 7  I ordered anemia work-up    Elevated alkaline phosphatase level  Assessment & Plan  CT - Low-density fluid collection the gallbladder fossa, could represent postop seroma, hematoma, or biloma. Early forming abscess is not radiographically excluded.  Alk phos 520 --> 1033  Surgery recs    Bipolar disorder (HCC)  Assessment & Plan  Continue Lamictal         VTE prophylaxis: SCD    I have performed a physical exam and reviewed and updated ROS and Plan today (8/26/2023). In review of yesterday's note (8/25/2023), there are no changes except as documented above.

## 2023-08-26 NOTE — H&P
Hospital Medicine History & Physical Note    Date of Service  8/25/2023    Primary Care Physician  Pcp Pt States None    Consultants  general surgery    Specialist Names: Dr. Arana    Code Status  Full Code    Chief Complaint  Chief Complaint   Patient presents with    Abdominal Pain     Pt had cholecystectomy on 8/19 and had f/u appointment for worsening abdominal pain with [facility].  She was late to the appointment and they told her to come here since they could not see her today.       History of Presenting Illness  Yenni Nieto is a 33 y.o. female who presented 8/25/2023 with abdominal pain.  Patient recently had a cholecystectomy on 8/19 and was discharged 8/20.  Patient had a smooth recovery for approximately 3 days.  On 8/23 patient began having abdominal pain most notably after meals.  Patient states that abdominal pain has progressively worsened over the last several days and therefore presents to [facility] for evaluation.  Patient endorses nausea, no episodes of vomiting, no diarrhea.  Pain is sharp, stabbing and localized to the right upper quadrant.  Patient denies fever, chills.  In the ED.  AST and ALT appear downtrending from prior.  Alk phos has increased to 1033 from 520 on discharge.  CT abdomen pelvis demonstrates a fluid collection in the gallbladder fossa.  General surgery was consulted.  I discussed case with ER provider and patient will be admitted for further work-up and monitoring.    I discussed the plan of care with patient and ER provider .    Review of Systems  Review of Systems   Constitutional:  Negative for chills and fever.   HENT:  Negative for congestion and sore throat.    Eyes:  Negative for blurred vision and double vision.   Respiratory:  Negative for cough and shortness of breath.    Cardiovascular:  Negative for chest pain and palpitations.   Gastrointestinal:  Positive for abdominal pain and nausea. Negative for diarrhea and vomiting.   Genitourinary:  Negative  "for dysuria and frequency.   Musculoskeletal:  Negative for back pain and falls.   Skin:  Negative for rash.   Neurological:  Negative for seizures and headaches.   Psychiatric/Behavioral:  Negative for hallucinations and substance abuse.        Past Medical History   has a past medical history of Seizures (HCC).    Surgical History   has a past surgical history that includes simon by laparoscopy (8/19/2023).     Family History  family history is not on file.   Family history reviewed with patient. There is no family history that is pertinent to the chief complaint.  Reviewed and not pertinent      Social History   reports that she has never smoked. She has never used smokeless tobacco. She reports that she does not drink alcohol and does not use drugs.    Allergies  Allergies   Allergen Reactions    Keppra [Levetiracetam] Unspecified     \"Increased seizures\" per patient       Medications  Prior to Admission Medications   Prescriptions Last Dose Informant Patient Reported? Taking?   ibuprofen (ADVIL) 200 MG Tab 8/25/2023 at 0600 Patient Yes No   Sig: Take 400 mg by mouth every 6 hours as needed for Mild Pain.   lamoTRIgine (LAMICTAL) 100 MG Tab 8/25/2023 at 0900 Patient Yes No   Sig: Take 100 mg by mouth 2 times a day.      Facility-Administered Medications: None       Physical Exam  Temp:  [36.6 °C (97.8 °F)] 36.6 °C (97.8 °F)  Pulse:  [56-93] 62  Resp:  [16-20] 16  BP: (107-141)/(55-90) 122/59  SpO2:  [95 %-99 %] 96 %  Blood Pressure: 122/59   Temperature: 36.6 °C (97.8 °F)   Pulse: 62   Respiration: 16   Pulse Oximetry: 96 %       Physical Exam  Vitals and nursing note reviewed.   Constitutional:       General: She is not in acute distress.     Appearance: She is not toxic-appearing.      Comments: Pleasant, conversational   HENT:      Head: Normocephalic.      Right Ear: External ear normal.      Left Ear: External ear normal.      Nose: No congestion.      Mouth/Throat:      Pharynx: No oropharyngeal exudate. " "  Eyes:      General: No scleral icterus.  Cardiovascular:      Rate and Rhythm: Normal rate and regular rhythm.      Heart sounds: No murmur heard.  Pulmonary:      Breath sounds: No wheezing.   Abdominal:      Palpations: Abdomen is soft.      Tenderness: There is abdominal tenderness. There is no guarding or rebound.   Musculoskeletal:         General: No swelling or deformity.   Skin:     Coloration: Skin is not jaundiced.      Findings: No bruising.   Neurological:      General: No focal deficit present.      Mental Status: She is oriented to person, place, and time.      Motor: No weakness.   Psychiatric:         Mood and Affect: Mood normal.         Behavior: Behavior normal.         Laboratory:  Recent Labs     08/25/23  1606   WBC 9.5   RBC 4.89   HEMOGLOBIN 10.3*   HEMATOCRIT 33.7*   MCV 68.9*   MCH 21.1*   MCHC 30.6*   RDW 45.6   PLATELETCT 251   MPV 10.0     Recent Labs     08/25/23  1606   SODIUM 140   POTASSIUM 3.8   CHLORIDE 104   CO2 20   GLUCOSE 87   BUN 8   CREATININE 0.50   CALCIUM 10.0     Recent Labs     08/25/23  1606   ALTSGPT 233*   ASTSGOT 117*   ALKPHOSPHAT 1033*   TBILIRUBIN 1.0   LIPASE 23   GLUCOSE 87         No results for input(s): \"NTPROBNP\" in the last 72 hours.      No results for input(s): \"TROPONINT\" in the last 72 hours.    Imaging:  CT-ABDOMEN-PELVIS WITH   Final Result         1.  Intra-abdominal free air, within expected limits for recent postop changes. Differential could include viscus perforation in the appropriate clinical setting.   2.  Low-density fluid collection the gallbladder fossa, could represent postop seroma, hematoma, or biloma. Early forming abscess is not radiographically excluded.      LO-[id number]-V/O    (Results Pending)     Assessment/Plan:  Justification for Admission Status  I anticipate this patient is appropriate for observation status at this time because Post operative abdominal pain    Patient will need a Med/Surg bed on MEDICAL service .  The need is " secondary to post operative abdominal pain.    * Abdominal pain- (present on admission)  Assessment & Plan  Postoperative abdominal pain following cholecystectomy formed on 8/19  -->117  -->233  Alk Phos 520 -->1033  WBC 9.5  CTAP - Low-density fluid collection the gallbladder fossa, could represent postop seroma, hematoma, or biloma. Early forming abscess is not radiographically excluded.  Surgery consulted  Clear liquid diet  Pain control  MRCP ordered    Elevated alkaline phosphatase level  Assessment & Plan  CT - Low-density fluid collection the gallbladder fossa, could represent postop seroma, hematoma, or biloma. Early forming abscess is not radiographically excluded.  Alk phos 520 --> 1033  Surgery recs    Bipolar disorder (HCC)  Assessment & Plan  Continue Lamictal        VTE prophylaxis: SCDs/TEDs

## 2023-08-26 NOTE — ASSESSMENT & PLAN NOTE
CT - Low-density fluid collection the gallbladder fossa, could represent postop seroma, hematoma, or biloma. Early forming abscess is not radiographically excluded.  Alk phos 520 --> 1033  Surgery recs

## 2023-08-26 NOTE — PROGRESS NOTES
DATE: 8/26/2023 8/19 Laparoscopic cholecystectomy.  8/25 Readmitted for abdominal pain most notably after meals. Elevated liver enzymes.     INTERVAL EVENTS:    No pain on exam. AM labs with improved transaminases.     - MRCP for evaluation of common duct stone.     REVIEW OF SYSTEMS:  Review of Systems   Constitutional: Negative.    Respiratory: Negative.     Cardiovascular: Negative.    Gastrointestinal: Negative.    Skin: Negative.        PHYSICAL EXAMINATION:  Vital Signs: BP 97/55   Pulse 60   Temp 36.5 °C (97.7 °F) (Temporal)   Resp 16   Wt 79.2 kg (174 lb 9.7 oz)   SpO2 95%   Physical Exam  Vitals and nursing note reviewed.   Constitutional:       General: She is not in acute distress.     Appearance: She is not ill-appearing, toxic-appearing or diaphoretic.   HENT:      Head: Normocephalic.      Right Ear: External ear normal.      Left Ear: External ear normal.      Mouth/Throat:      Mouth: Mucous membranes are dry.      Pharynx: Oropharynx is clear.   Eyes:      General:         Right eye: No discharge.         Left eye: No discharge.   Cardiovascular:      Rate and Rhythm: Normal rate.   Pulmonary:      Effort: No respiratory distress.   Chest:      Chest wall: No tenderness.   Abdominal:      General: There is no distension.      Tenderness: There is no abdominal tenderness. There is no guarding or rebound.   Musculoskeletal:      Comments: Moves all extremities    Skin:     General: Skin is warm and dry.      Capillary Refill: Capillary refill takes less than 2 seconds.   Neurological:      Mental Status: She is alert.   Psychiatric:         Mood and Affect: Mood normal.         Behavior: Behavior normal.         LABORATORY VALUES:   Recent Labs     08/25/23  1606 08/26/23  0047   WBC 9.5 8.0   RBC 4.89 4.07*   HEMOGLOBIN 10.3* 8.5*   HEMATOCRIT 33.7* 28.2*   MCV 68.9* 69.3*   MCH 21.1* 20.9*   MCHC 30.6* 30.1*   RDW 45.6 45.4   PLATELETCT 251 181   MPV 10.0 9.5     Recent Labs      08/25/23  1606 08/26/23  0047   SODIUM 140 139   POTASSIUM 3.8 3.7   CHLORIDE 104 106   CO2 20 21   GLUCOSE 87 81   BUN 8 7*   CREATININE 0.50 0.47*   CALCIUM 10.0 8.9     Recent Labs     08/25/23  1606 08/26/23  0047   ASTSGOT 117* 83*   ALTSGPT 233* 176*   TBILIRUBIN 1.0 0.7   ALKPHOSPHAT 1033* 867*   GLOBULIN 3.2 2.4            IMAGING:   CT-ABDOMEN-PELVIS WITH   Final Result         1.  Intra-abdominal free air, within expected limits for recent postop changes. Differential could include viscus perforation in the appropriate clinical setting.   2.  Low-density fluid collection the gallbladder fossa, could represent postop seroma, hematoma, or biloma. Early forming abscess is not radiographically excluded.      [id number]-W/O    (Results Pending)                 DVT prophylaxis - mechanical: Not indicated at this time, ambulatory

## 2023-08-26 NOTE — CONSULTS
GASTROENTEROLOGY CONSULTATION    PATIENT NAME: Yenni Nieto  : 1990  CSN: [id number]  MRN:  [id number]     CONSULTATION DATE:  2023    PRIMARY CARE PROVIDER:  Pcp Pt States None      REASON FOR CONSULT: pain and nausea s/p cholecystectomy      HISTORY OF PRESENT ILLNESS:  Yenni Nieto is a 33 y.o. female who presented 2023 with abdominal pain.  Patient recently had a cholecystectomy on  and was discharged .  Patient had a smooth recovery for approximately 3 days.  On  patient began having abdominal pain most notably after meals.  Patient states that abdominal pain has progressively worsened over the last several days and therefore presents to [facility] for evaluation.  Patient endorses nausea, no episodes of vomiting, no diarrhea.  Pain is sharp, stabbing and localized to the right upper quadrant.  Patient denies fever, chills.  In the EDRAST and ALT appear downtrending from prior.  Alk phos has increased to 1033 from 520 on discharge. Bilirubin was normal. CT abdomen pelvis demonstrates a fluid collection in the gallbladder fossa    PAST MEDICAL HISTORY:  Past Medical History:   Diagnosis Date    Seizures (HCC)        PAST SURGICAL HISTORY:  Past Surgical History:   Procedure Laterality Date    FERNANDO BY LAPAROSCOPY  2023    Procedure: CHOLECYSTECTOMY, LAPAROSCOPIC;  Surgeon: Ky Ward M.D.;  [facility];  Service: General        CURRENT MEDS:  Current Facility-Administered Medications   Medication Dose Route Frequency Provider Last Rate Last Admin    lamoTRIgine (LaMICtal) tablet 100 mg  100 mg Oral BID Tanner Haynes D.O.   100 mg at 23 1024    senna-docusate (Pericolace Or Senokot S) 8.6-50 MG per tablet 2 Tablet  2 Tablet Oral BID Tanner Haynes D.O.        And    polyethylene glycol/lytes (Miralax) PACKET 1 Packet  1 Packet Oral QDAY PRN BLAINE GomesO.        And    magnesium hydroxide (Milk Of Magnesia) suspension 30 mL  30  "mL Oral QDAY PRN Tanner Haynes D.O.        And    bisacodyl (Dulcolax) suppository 10 mg  10 mg Rectal QDAY PRN Tanner Haynes D.O.        acetaminophen (Tylenol) tablet 650 mg  650 mg Oral Q6HRS PRN Tanner Haynes D.O.        ondansetron (Zofran) syringe/vial injection 4 mg  4 mg Intravenous Q4HRS PRN Tanner Haynes D.O.        ondansetron (Zofran ODT) dispertab 4 mg  4 mg Oral Q4HRS PRN Tanner Haynes D.O.        promethazine (Phenergan) tablet 12.5-25 mg  12.5-25 mg Oral Q4HRS PRN BLAINE GomesO.        promethazine (Phenergan) suppository 12.5-25 mg  12.5-25 mg Rectal Q4HRS PRN BLAINE GomesO.        prochlorperazine (Compazine) injection 5-10 mg  5-10 mg Intravenous Q4HRS PRN Tanner Haynes D.O.        Pharmacy Consult Request ...Pain Management Review 1 Each  1 Each Other PHARMACY TO DOSE Tanner Haynes D.O.        oxyCODONE immediate-release (Roxicodone) tablet 5 mg  5 mg Oral Q3HRS PRN BLAINE GomesO.   5 mg at 08/26/23 1025    Or    oxyCODONE immediate release (Roxicodone) tablet 10 mg  10 mg Oral Q3HRS PRN BLAINE GomesO.   10 mg at 08/26/23 0019    Or    HYDROmorphone (Dilaudid) injection 0.5 mg  0.5 mg Intravenous Q3HRS PRN BLAINE GomesOBrennen   0.5 mg at 08/26/23 0131        ALLERGIES:  Allergies   Allergen Reactions    Keppra [Levetiracetam] Unspecified     \"Increased seizures\" per patient       SOCIAL HISTORY:  Social History     Socioeconomic History    Marital status: Single     Spouse name: Not on file    Number of children: Not on file    Years of education: Not on file    Highest education level: Not on file   Occupational History    Not on file   Tobacco Use    Smoking status: Never    Smokeless tobacco: Never   Vaping Use    Vaping Use: Never used   Substance and Sexual Activity    Alcohol use: Never    Drug use: Never    Sexual activity: Not on file   Other Topics Concern    Not on file   Social History Narrative    Not on file     Social Determinants of Health " "    Financial Resource Strain: Not on file   Food Insecurity: Not on file   Transportation Needs: Not on file   Physical Activity: Not on file   Stress: Not on file   Social Connections: Not on file   Intimate Partner Violence: Not on file   Housing Stability: Not on file       FAMILY HISTORY:  History reviewed. No pertinent family history.     REVIEW OF SYSTEMS:  General ROS: Negative for - chills, fever, night sweats or weight loss.  HEENT ROS: Negative  Respiratory ROS: Negative for - cough or shortness of breath.  Cardiovascular ROS:  Negative for - chest pain or palpitations.  Gastrointestinal ROS: As per the history of present illness.  Genito-Urinary ROS: Negative  Musculoskeletal ROS: Negative.  Neurological ROS: Negative  Skin ROS: negative  Hematology ROS: negative  Endocrinology ROS: Negative        PHYSICAL EXAM:  VITALS: BP 97/55   Pulse 60   Temp 36.5 °C (97.7 °F) (Temporal)   Resp 16   Wt 79.2 kg (174 lb 9.7 oz)   LMP 08/01/2023 (Approximate)   SpO2 95%   BMI 29.06 kg/m²   GEN:  Yneni Nieto is a 33 y.o. female in no acute distress.  HEENT: Mucous membranes pink and moist.  Sclera anicteric.    NECK:    Neck supple without lymphadenopathy or thyromegaly.  LUNGS: Clear to auscultation posteriorly.  HEART: Regular rate and rhythm. S1 and S2 normal. No murmurs, gallops  ABD:  + BS tenderness in ruq and epigastric area -hsm  RECTAL: Not done at this time.  EXT:  Without cyanosis, deformity or pitting edema.  SKIN:  Pink, warm, dry.  NEURO: Grossly intact, A/OR.    LABS:  Recent Labs     08/25/23  1606 08/26/23  0047   WBC 9.5 8.0   MCV 68.9* 69.3*     Recent Labs     08/25/23  1606 08/26/23  0047   GLUCOSE 87 81   BUN 8 7*   CO2 20 21     No results found for: \"INR\", \"PT\"  No components found for: \"ALT\", \"AST\", \"GGT\", \"ALKPHOS\"  No results found for: \"BILINEO\"      @LASTIMGCAT([id number])@     @LASTIMGCAT([id number])@       IMPRESSION/PLAN:  Abdominal pain s/p cholecystectomy  Fluid collection in " gallbladder fossa  2. Elevated lft's but in different pattern  - bili normal but alk phos is elevated in manner not consistent with lft's    MRCP  If that is negative for obstruction, recommend HIDA scan to rule out biloma    3. nausea       Betty Escobar M.D.  Gastroenterology

## 2023-08-26 NOTE — ED PROVIDER NOTES
ER Provider Note    Scribed for Tanner Haynes D.o. by Frank Pierson. 8/25/2023  5:08 PM    Primary Care Provider: Pcp Pt States None    CHIEF COMPLAINT  Chief Complaint   Patient presents with    Abdominal Pain     Pt had cholecystectomy on 8/19 and had f/u appointment for worsening abdominal pain with [facility].  She was late to the appointment and they told her to come here since they could not see her today.     EXTERNAL RECORDS REVIEWED  Care everywhere Patient had a cholecystectomy surgery performed by Dr Ward.     HPI/ROS  LIMITATION TO HISTORY   Select: : None  OUTSIDE HISTORIAN(S):  None    Yenni Nieto is a 33 y.o. female who presents to the ED for evaluation of abdominal pain onset last night after eating. The patient reports abdominal pain after eating with associated nausea and occasional chills She locates her pain in her middle abdomen, describing it radiating to her lower abdomen. The patient went home Sunday following a cholecystectomy Saturday 8/19 for symptoms onset one week prior to surgery. The patient sates she felt better Monday that she was feeling extremely improved on Tuesday, noting soreness she was able to treat with motrin. She states she felt a sharp pain in her abdomen Wednesday which she believed to be associated with her surgery. She states she ate boiled eggs with mayonnaise Wednesday afternoon, experiencing heart burn that she treated with Tums which alleviated her symptoms. She reports she took her last oxycodone last night following an increase in pain yesterday night after she ate brown rice and orange chicken from [employer].   She states she woke up feeling no pain before eating toast with almond butter. Within three hours of eating she began to experience pain.   She had an appointment today with her surgeon for symptom evaluation of her returning pain, but was sent to the ED since the surgeon had left by the time she arrived. She denies vomiting, fever,  burning with urination or difficulty urinating, sore throat, cough or runny nose. She reports she has been watching her eating, avoiding oils and only eating home-cooked meals after surgery. She has a history of gallstones.     PAST MEDICAL HISTORY  Past Medical History:   Diagnosis Date    Seizures (HCC)        SURGICAL HISTORY  Past Surgical History:   Procedure Laterality Date    FERNANDO BY LAPAROSCOPY  8/19/2023    Procedure: CHOLECYSTECTOMY, LAPAROSCOPIC;  Surgeon: Ky Ward M.D.;  Location: SURGERY [facility];  Service: General       FAMILY HISTORY  History reviewed. No pertinent family history.    SOCIAL HISTORY   reports that she has never smoked. She has never used smokeless tobacco. She reports that she does not drink alcohol and does not use drugs.    CURRENT MEDICATIONS  Previous Medications    IBUPROFEN (ADVIL) 200 MG TAB    Take 400 mg by mouth every 6 hours as needed for Mild Pain.    LAMOTRIGINE (LAMICTAL) 100 MG TAB    Take 100 mg by mouth 2 times a day.       ALLERGIES  Keppra [levetiracetam]    PHYSICAL EXAM  BP (!) 135/90   Pulse 93   Temp 36.6 °C (97.8 °F) (Temporal)   Resp 20   Wt 79.2 kg (174 lb 9.7 oz)   LMP 08/01/2023 (Approximate)   SpO2 98%   BMI 29.06 kg/m²     Constitutional: Moderately uncomfortable due to pain, Well developed, well nourished;  Non-toxic appearance.   HENT: Oropharynx with dry mucous membranes, Normocephalic, atraumatic; Bilateral external ears normal; slightly dry mucous membranes  Eyes: PERRL, EOMI, Conjunctiva normal. No discharge.   Neck:  Supple, nontender midline; No stridor; No nuchal rigidity.   Lymphatic: No cervical lymphadenopathy noted.   Cardiovascular: Regular rate and rhythm without murmurs, rubs, or gallop.   Thorax & Lungs: No respiratory distress, breath sounds clear to auscultation bilaterally without wheezing, rales or rhonchi. Nontender chest wall. No crepitus or subcutaneous air  Abdomen: Diffusely tender to percussion and  palpation, maximally in mid epigastrum and upper abdomen Soft, bowel sounds normal. No obvious masses; No pulsatile masses; no rebound, guarding, or peritoneal signs.   Skin: Surgical wounds are clean dry and intact without signs of infection. Good color; warm and dry without rash or petechia.  Back: Nontender, No CVA tenderness.   Extremities: Distal radial, dorsalis pedis, posterior tibial pulses are equal bilaterally; No edema; Nontender calves or saphenous, No cyanosis, No clubbing.   Musculoskeletal: Good range of motion in all major joints. No tenderness to palpation or major deformities noted.   Neurologic: Alert & oriented x 4, clear speech    DIAGNOSTIC STUDIES    Labs:   Results for orders placed or performed during the hospital encounter of 08/25/23   CBC WITH DIFFERENTIAL   Result Value Ref Range    WBC 9.5 4.8 - 10.8 K/uL    RBC 4.89 4.20 - 5.40 M/uL    Hemoglobin 10.3 (L) 12.0 - 16.0 g/dL    Hematocrit 33.7 (L) 37.0 - 47.0 %    MCV 68.9 (L) 81.4 - 97.8 fL    MCH 21.1 (L) 27.0 - 33.0 pg    MCHC 30.6 (L) 32.2 - 35.5 g/dL    RDW 45.6 35.9 - 50.0 fL    Platelet Count 251 164 - 446 K/uL    MPV 10.0 9.0 - 12.9 fL    Neutrophils-Polys 70.60 44.00 - 72.00 %    Lymphocytes 21.80 (L) 22.00 - 41.00 %    Monocytes 5.90 0.00 - 13.40 %    Eosinophils 0.90 0.00 - 6.90 %    Basophils 0.30 0.00 - 1.80 %    Immature Granulocytes 0.50 0.00 - 0.90 %    Nucleated RBC 0.00 0.00 - 0.20 /100 WBC    Neutrophils (Absolute) 6.70 1.82 - 7.42 K/uL    Lymphs (Absolute) 2.07 1.00 - 4.80 K/uL    Monos (Absolute) 0.56 0.00 - 0.85 K/uL    Eos (Absolute) 0.09 0.00 - 0.51 K/uL    Baso (Absolute) 0.03 0.00 - 0.12 K/uL    Immature Granulocytes (abs) 0.05 0.00 - 0.11 K/uL    NRBC (Absolute) 0.00 K/uL   COMP METABOLIC PANEL   Result Value Ref Range    Sodium 140 135 - 145 mmol/L    Potassium 3.8 3.6 - 5.5 mmol/L    Chloride 104 96 - 112 mmol/L    Co2 20 20 - 33 mmol/L    Anion Gap 16.0 7.0 - 16.0    Glucose 87 65 - 99 mg/dL    Bun 8 8 - 22  mg/dL    Creatinine 0.50 0.50 - 1.40 mg/dL    Calcium 10.0 8.5 - 10.5 mg/dL    Correct Calcium 9.5 8.5 - 10.5 mg/dL    AST(SGOT) 117 (H) 12 - 45 U/L    ALT(SGPT) 233 (H) 2 - 50 U/L    Alkaline Phosphatase 1033 (H) 30 - 99 U/L    Total Bilirubin 1.0 0.1 - 1.5 mg/dL    Albumin 4.6 3.2 - 4.9 g/dL    Total Protein 7.8 6.0 - 8.2 g/dL    Globulin 3.2 1.9 - 3.5 g/dL    A-G Ratio 1.4 g/dL   LIPASE   Result Value Ref Range    Lipase 23 11 - 82 U/L   HCG QUAL SERUM   Result Value Ref Range    Beta-Hcg Qualitative Serum Negative Negative   URINALYSIS,CULTURE IF INDICATED    Specimen: Urine, Clean Catch   Result Value Ref Range    Color Yellow     Character Clear     Specific Gravity 1.014 <1.035    Ph 6.0 5.0 - 8.0    Glucose Negative Negative mg/dL    Ketones 40 (A) Negative mg/dL    Protein Negative Negative mg/dL    Bilirubin Negative Negative    Urobilinogen, Urine 1.0 Negative    Nitrite Negative Negative    Leukocyte Esterase Negative Negative    Occult Blood Negative Negative    Micro Urine Req see below    ESTIMATED GFR   Result Value Ref Range    GFR (CKD-EPI) 128 >60 mL/min/1.73 m 2      Radiology:   The attending emergency physician has independently interpreted the diagnostic imaging associated with this visit and am waiting the final reading from the radiologist.     Preliminary interpretation is a follows: ER MD is reviewed the patient's CT scan.  No obvious obstruction.    Radiologist interpretation:   CT-ABDOMEN-PELVIS WITH   Final Result         1.  Intra-abdominal free air, within expected limits for recent postop changes. Differential could include viscus perforation in the appropriate clinical setting.   2.  Low-density fluid collection the gallbladder fossa, could represent postop seroma, hematoma, or biloma. Early forming abscess is not radiographically excluded.           COURSE & MEDICAL DECISION MAKING     ED Observation Status? Yes; I am placing the patient in to an observation status due to a  diagnostic uncertainty as well as therapeutic intensity. Patient placed in observation status at 5:19 PM, 8/25/2023.     Observation plan is as follows: Monitor for symptom management and diagnostic results     Upon Reevaluation, the patient's condition has: not improved; and will be escalated to hospitalization.    Patient discharged from ED Observation status at 9:00 PM  (Time) 8/25/2023  (Date).     INITIAL ASSESSMENT, COURSE AND PLAN  Care Narrative: Patient presents to the ER with complaint of recurrent upper abdominal pain with associated nausea and vomiting which began several days after a cholecystectomy.  Patient presented here to the ER about a week ago with the same pain.  She was found to have stones and sludge in the gallbladder and underwent cholecystectomy.  She had some transaminitis at that time.  It was thought to be related to the inflammation around the liver.  Bilirubin went from 1.6-1.1.  She was discharged to follow-up with surgery today.  However, she was late to her appointment and the surgeon had already left and would not see her.  She was told to come here to the ER.  Patient describes the pain that she has been having on and off over the last few days as identical to the pain she had before the cholecystectomy.  She is also had nausea and vomiting.  She had chills but no fever.  She is tender diffusely throughout the abdomen, mostly in the upper abdomen.  No real tenderness specifically around the surgical wounds.  She says when the pain in her upper abdomen is gone, the surgical wounds in her abdomen really does not hurt.  Labs reveal an elevation in her alkaline phosphatase when compared to several days ago.  Her alk phos went from 520 five days ago to 1033 today.  The AST and ALT are actually little bit lower than they were previously.  The bilirubin is normal at 1.0.  This time I am concerned she could have a choledocholithiasis.  I spoke with Dr. Patten, general surgeon on-call for  Dr. Ward.  He said there is really nothing surgical and therefore he thinks she can go to the medicine service.  I spoke with Dr. Haynes, hospitalist on-call, he will kindly evaluate the patient for hospitalization and probable MRCP in the morning.    5:18 PM - Patient seen and examined at bedside. Discussed plan of care, including labs, imaging, and medication for pain. Patient agrees to the plan of care. The patient will be medicated with morphine 4 mg injection and Zofran 4 mg injection. Ordered for lab work and imaging to evaluate her symptoms.     5:31 PM - Patient was reevaluated at bedside. Discussed lab and radiology results with the patient and informed them of possible gallstones blocking ducts after surgery. Ordered for CT-Abdomen-Pelvis WITH for further evaluation of symptoms.      7:57 PM - Ordered for Benadryl 25 mg injection before CT. The patient states she experienced itchiness during a CT-scan as a teenager.     8:49 PM - Paged surgery    8:56 PM - I discussed the patient's case and the above findings with Dr. Arana (Surgery) who noted nothing surgical, recommended hospitalization to medicine and agreed to consult.      9:34 PM - Paged Hospitalist.     HYDRATION: Based on the patient's presentation of Dehydration the patient was given IV fluids. IV Hydration was used because oral hydration was not adequate alone. Upon recheck following hydration, the patient was improved.    ADDITIONAL PROBLEM LIST  Problem #1: Recurrent abdominal pain with associated nausea and vomiting after eating    DISPOSITION AND DISCUSSIONS  I have discussed management of the patient with the following physicians and CURRY's:  Dr. Arana (Surgery), Dr. Haynes (Hospitalist)     Discussion of management with other [facility] or appropriate source(s): None     Escalation of care considered, and ultimately not performed: diagnostic imaging.  No need for ultrasound this patient's gallbladder is already been removed.    Barriers to care at  this time, including but not limited to: Patient does not have established PCP.     Decision tools and prescription drugs considered including, but not limited to: Pain Medications patient was given pain medication for abdominal pain. .    DISPOSITION:  Patient will be hospitalized by Dr. Haynes in guarded condition.     FINAL DIANGOSIS  1. Transaminitis Acute   2. Abdominal pain, unspecified abdominal location Acute          This dictation has been created using voice recognition software. The accuracy of the dictation is limited by the abilities of the software. I expect there may be some errors of grammar and possibly content. I made every attempt to manually correct the errors within my dictation. However, errors related to voice recognition software may still exist and should be interpreted within the appropriate context.      IFrank (Reinier), am scribing for, and in the presence of, Rebekah Rehman M.D..    Electronically signed by: Frank Pierson (Reinier), 8/25/2023    Rebekah LLAMAS M.D. personally performed the services described in this documentation, as scribed by Frank Pierson in my presence, and it is both accurate and complete.     The note accurately reflects work and decisions made by me.  Rebekah Rehman M.D.  8/25/2023  10:25 PM

## 2023-08-26 NOTE — ED NOTES
Advanced ED tech starting IV.  
Assumed care of patient, bedside report received from off coming Leah GOYAL.  Introduced self to pt as RN, POC discussed, call light in reach, Pt on alla CHÁVEZ locked and at lowest setting. Pulse ox and bp monitor in place. Awaiting CT.  
Bedside report given to JAY JAY Rai.  Pt on RA, pulse ox and blood pressure cuff in place.    Awaiting CT.    
CT called RN and pt had reported to CT that she had had past rxn to contrast. ERP notified. RN to Ct to medicate pt. Pt medicated per MAR, tolerated well. Pt currently in CT.  
ERP at bedside.   
Med rec completed per patient  Allergies reviewed  No PO Antibiotics in the last 30 days     
Pt ambulated to restroom, provided cup for urine sample.   
Pt medicated per MAR.  Pt aware of plan for CT.   Call light in reach.    
Pt resting with even chest rise and fall, reports no needs at this time, call light available and in reach.    
Report attempted, RN to call back shortly.  
Report called to JAY JAY Kaur. All questions answered. PT awaiting transport.    
Urine sample collected and sent to lab.    Pt aware of delay in CT, apologized for wait.    
negative...

## 2023-08-26 NOTE — PROGRESS NOTES
Report received from Natasha GOYAL and care of patient assumed at 0700. Patient appeared to tolerate breakfast. Call light is within reach.

## 2023-08-27 ENCOUNTER — APPOINTMENT (OUTPATIENT)
Dept: RADIOLOGY | Facility: MEDICAL CENTER | Age: 33
End: 2023-08-27
Attending: STUDENT IN AN ORGANIZED HEALTH CARE EDUCATION/TRAINING PROGRAM

## 2023-08-27 ENCOUNTER — PHARMACY VISIT (OUTPATIENT)
Dept: PHARMACY | Facility: MEDICAL CENTER | Age: 33
End: 2023-08-27
Payer: COMMERCIAL

## 2023-08-27 ENCOUNTER — APPOINTMENT (OUTPATIENT)
Dept: RADIOLOGY | Facility: MEDICAL CENTER | Age: 33
End: 2023-08-27
Attending: INTERNAL MEDICINE

## 2023-08-27 VITALS
WEIGHT: 174.6 LBS | OXYGEN SATURATION: 96 % | HEART RATE: 54 BPM | TEMPERATURE: 97.9 F | DIASTOLIC BLOOD PRESSURE: 71 MMHG | RESPIRATION RATE: 16 BRPM | BODY MASS INDEX: 29.06 KG/M2 | SYSTOLIC BLOOD PRESSURE: 118 MMHG

## 2023-08-27 PROBLEM — D50.9 IRON DEFICIENCY ANEMIA: Status: ACTIVE | Noted: 2023-08-27

## 2023-08-27 LAB
ALBUMIN SERPL BCP-MCNC: 3.7 G/DL (ref 3.2–4.9)
ALBUMIN/GLOB SERPL: 1.4 G/DL
ALP SERPL-CCNC: 887 U/L (ref 30–99)
ALT SERPL-CCNC: 132 U/L (ref 2–50)
ANION GAP SERPL CALC-SCNC: 12 MMOL/L (ref 7–16)
AST SERPL-CCNC: 43 U/L (ref 12–45)
BILIRUB SERPL-MCNC: 0.4 MG/DL (ref 0.1–1.5)
BUN SERPL-MCNC: 5 MG/DL (ref 8–22)
CALCIUM ALBUM COR SERPL-MCNC: 9.2 MG/DL (ref 8.5–10.5)
CALCIUM SERPL-MCNC: 9 MG/DL (ref 8.5–10.5)
CHLORIDE SERPL-SCNC: 105 MMOL/L (ref 96–112)
CO2 SERPL-SCNC: 22 MMOL/L (ref 20–33)
CREAT SERPL-MCNC: 0.44 MG/DL (ref 0.5–1.4)
ERYTHROCYTE [DISTWIDTH] IN BLOOD BY AUTOMATED COUNT: 46.9 FL (ref 35.9–50)
FERRITIN SERPL-MCNC: 14.7 NG/ML (ref 10–291)
GFR SERPLBLD CREATININE-BSD FMLA CKD-EPI: 132 ML/MIN/1.73 M 2
GLOBULIN SER CALC-MCNC: 2.7 G/DL (ref 1.9–3.5)
GLUCOSE SERPL-MCNC: 79 MG/DL (ref 65–99)
HCT VFR BLD AUTO: 28.5 % (ref 37–47)
HGB BLD-MCNC: 8.6 G/DL (ref 12–16)
IRON SATN MFR SERPL: 6 % (ref 15–55)
IRON SERPL-MCNC: 20 UG/DL (ref 40–170)
MAGNESIUM SERPL-MCNC: 1.8 MG/DL (ref 1.5–2.5)
MCH RBC QN AUTO: 21 PG (ref 27–33)
MCHC RBC AUTO-ENTMCNC: 30.2 G/DL (ref 32.2–35.5)
MCV RBC AUTO: 69.7 FL (ref 81.4–97.8)
PHOSPHATE SERPL-MCNC: 4 MG/DL (ref 2.5–4.5)
PLATELET # BLD AUTO: 154 K/UL (ref 164–446)
PMV BLD AUTO: 9.2 FL (ref 9–12.9)
POTASSIUM SERPL-SCNC: 3.5 MMOL/L (ref 3.6–5.5)
PROT SERPL-MCNC: 6.4 G/DL (ref 6–8.2)
RBC # BLD AUTO: 4.09 M/UL (ref 4.2–5.4)
SODIUM SERPL-SCNC: 139 MMOL/L (ref 135–145)
TIBC SERPL-MCNC: 314 UG/DL (ref 250–450)
UIBC SERPL-MCNC: 294 UG/DL (ref 110–370)
VIT B12 SERPL-MCNC: 525 PG/ML (ref 211–911)
WBC # BLD AUTO: 5.9 K/UL (ref 4.8–10.8)

## 2023-08-27 PROCEDURE — 82607 VITAMIN B-12: CPT

## 2023-08-27 PROCEDURE — 83540 ASSAY OF IRON: CPT

## 2023-08-27 PROCEDURE — 36415 COLL VENOUS BLD VENIPUNCTURE: CPT

## 2023-08-27 PROCEDURE — RXMED WILLOW AMBULATORY MEDICATION CHARGE: Performed by: STUDENT IN AN ORGANIZED HEALTH CARE EDUCATION/TRAINING PROGRAM

## 2023-08-27 PROCEDURE — 700111 HCHG RX REV CODE 636 W/ 250 OVERRIDE (IP): Performed by: INTERNAL MEDICINE

## 2023-08-27 PROCEDURE — A9537 TC99M MEBROFENIN: HCPCS

## 2023-08-27 PROCEDURE — 83735 ASSAY OF MAGNESIUM: CPT

## 2023-08-27 PROCEDURE — 96376 TX/PRO/DX INJ SAME DRUG ADON: CPT

## 2023-08-27 PROCEDURE — 82728 ASSAY OF FERRITIN: CPT

## 2023-08-27 PROCEDURE — G0378 HOSPITAL OBSERVATION PER HR: HCPCS

## 2023-08-27 PROCEDURE — A9270 NON-COVERED ITEM OR SERVICE: HCPCS | Mod: JZ | Performed by: STUDENT IN AN ORGANIZED HEALTH CARE EDUCATION/TRAINING PROGRAM

## 2023-08-27 PROCEDURE — 80053 COMPREHEN METABOLIC PANEL: CPT

## 2023-08-27 PROCEDURE — A9270 NON-COVERED ITEM OR SERVICE: HCPCS | Performed by: INTERNAL MEDICINE

## 2023-08-27 PROCEDURE — 99231 SBSQ HOSP IP/OBS SF/LOW 25: CPT | Performed by: NURSE PRACTITIONER

## 2023-08-27 PROCEDURE — 700102 HCHG RX REV CODE 250 W/ 637 OVERRIDE(OP): Performed by: INTERNAL MEDICINE

## 2023-08-27 PROCEDURE — 700102 HCHG RX REV CODE 250 W/ 637 OVERRIDE(OP): Mod: JZ | Performed by: STUDENT IN AN ORGANIZED HEALTH CARE EDUCATION/TRAINING PROGRAM

## 2023-08-27 PROCEDURE — 85027 COMPLETE CBC AUTOMATED: CPT

## 2023-08-27 PROCEDURE — 83550 IRON BINDING TEST: CPT

## 2023-08-27 PROCEDURE — 99239 HOSP IP/OBS DSCHRG MGMT >30: CPT | Performed by: STUDENT IN AN ORGANIZED HEALTH CARE EDUCATION/TRAINING PROGRAM

## 2023-08-27 PROCEDURE — 84100 ASSAY OF PHOSPHORUS: CPT

## 2023-08-27 RX ORDER — OXYCODONE HYDROCHLORIDE 10 MG/1
10 TABLET ORAL EVERY 6 HOURS PRN
Qty: 15 TABLET | Refills: 0 | Status: ON HOLD | OUTPATIENT
Start: 2023-08-27 | End: 2023-09-02

## 2023-08-27 RX ORDER — POTASSIUM CHLORIDE 20 MEQ/1
40 TABLET, EXTENDED RELEASE ORAL ONCE
Status: COMPLETED | OUTPATIENT
Start: 2023-08-27 | End: 2023-08-27

## 2023-08-27 RX ORDER — FERROUS SULFATE 325(65) MG
325 TABLET ORAL
Qty: 30 TABLET | Refills: 0 | Status: SHIPPED | OUTPATIENT
Start: 2023-08-28 | End: 2023-09-27

## 2023-08-27 RX ORDER — FERROUS SULFATE 325(65) MG
325 TABLET ORAL
Status: DISCONTINUED | OUTPATIENT
Start: 2023-08-28 | End: 2023-08-27 | Stop reason: HOSPADM

## 2023-08-27 RX ADMIN — POTASSIUM CHLORIDE 40 MEQ: 1500 TABLET, EXTENDED RELEASE ORAL at 10:33

## 2023-08-27 RX ADMIN — HYDROMORPHONE HYDROCHLORIDE 0.5 MG: 1 INJECTION, SOLUTION INTRAMUSCULAR; INTRAVENOUS; SUBCUTANEOUS at 11:47

## 2023-08-27 RX ADMIN — LAMOTRIGINE 100 MG: 100 TABLET ORAL at 10:33

## 2023-08-27 RX ADMIN — ACETAMINOPHEN 650 MG: 325 TABLET, FILM COATED ORAL at 01:36

## 2023-08-27 ASSESSMENT — ENCOUNTER SYMPTOMS
ABDOMINAL PAIN: 1
NAUSEA: 0
VOMITING: 0

## 2023-08-27 ASSESSMENT — PAIN DESCRIPTION - PAIN TYPE
TYPE: ACUTE PAIN

## 2023-08-27 NOTE — PROGRESS NOTES
Peripheral IV access has been removed. Gauze and tape applied to site. Patient tolerated well. Discharge education, AVS, and controlled substances informed use consent were reviewed with patient. A copy of all documentation was provided to patient to keep, and an additional copy of all documentation was signed by patient. Discharge medications were also reviewed with patient. Discharge escort was declined - patient drove herself to the ER and her vehicle remained available for patient. Patient has discharged home with all belongings.

## 2023-08-27 NOTE — PROGRESS NOTES
Received report from previous shift RN at 1900.  Assessment complete.  A&O x 4. Patient calls appropriately.  Patient ambulates independently.  Patient has 0/10 pain. No interventions for pain implemented at this time, will continue to monitor.  Denies N&V. Tolerating clear liquid diet.  Skin per flowsheets.  + void, + flatus, - BM.  Patient denies SOB on room air.    Patient pleasant and cooperative throughout assessment.  Reviewed plan of care with patient, pt verbalizes understanding. Call light and personal belongings with in reach. Hourly rounding in place. All needs met at this time.

## 2023-08-27 NOTE — DISCHARGE INSTRUCTIONS
- Follow up with primary care physician in 1 week.   - Follow up with surgery as instructed  - Please take the medications as instructed    - Go to the local Emergency Department if you have any worsening condition.

## 2023-08-27 NOTE — DISCHARGE SUMMARY
Discharge Summary    CHIEF COMPLAINT ON ADMISSION  Chief Complaint   Patient presents with    Abdominal Pain     Pt had cholecystectomy on 8/19 and had f/u appointment for worsening abdominal pain with [facility].  She was late to the appointment and they told her to come here since they could not see her today.       Reason for Admission  severe abdominal pain     Admission Date  8/25/2023    CODE STATUS  Full Code    HPI & HOSPITAL COURSE  Yenni Nieto is a 33 y.o. female who presented 8/25/2023 with abdominal pain.  Patient recently had a cholecystectomy on 8/19 and was discharged 8/20.   CT abdomen pelvis demonstrates  Low-density fluid collection the gallbladder fossa, could represent postop seroma, hematoma, or biloma. Early forming abscess is not radiographically excluded. MRCP showed . Mildly dilated CBD 10 mm. No choledocholithiasis; HIDA negative for bile leak. Both surgery and GI were consulted. Considered that patient had stone passed. Her liver enzyme significant limproved, Normal bilirubin.  Patient is cleared for discharge.   Iron deficiency anemia, I prescribed iron supplement.       Therefore, she is discharged in good and stable condition to home with close outpatient follow-up.    The patient met 2-midnight criteria for an inpatient stay at the time of discharge.    Discharge Date  8/27/2023    FOLLOW UP ITEMS POST DISCHARGE  - Follow up with primary care physician in 1 week.   - Follow up with surgery as instructed  - Please take the medications as instructed    - Go to the local Emergency Department if you have any worsening condition.     DISCHARGE DIAGNOSES  Principal Problem:    Abdominal pain (POA: Yes)  Active Problems:    Bipolar disorder (HCC) (POA: Unknown)    Elevated alkaline phosphatase level (POA: Unknown)    Acute anemia (POA: Unknown)    Elevated liver enzymes (POA: Unknown)    Iron deficiency anemia (POA: Unknown)  Resolved Problems:    * No resolved hospital  "problems. *      FOLLOW UP  No future appointments.  [facility]  [address]  [facility] [address]  [phone]    Follow up  As needed, If symptoms worsen      MEDICATIONS ON DISCHARGE     Medication List        START taking these medications        Instructions   ferrous sulfate 325 (65 Fe) MG tablet  Start taking on: August 28, 2023   Take 1 Tablet by mouth every morning with breakfast for 30 days.  Dose: 325 mg     oxyCODONE immediate release 10 MG immediate release tablet  Commonly known as: Roxicodone   Take 1 Tablet by mouth every 6 hours as needed for Severe Pain for up to 5 days.  Dose: 10 mg            CONTINUE taking these medications        Instructions   Advil 200 MG Tabs  Generic drug: ibuprofen   Take 400 mg by mouth every 6 hours as needed for Mild Pain.  Dose: 400 mg     lamoTRIgine 100 MG Tabs  Commonly known as: LaMICtal   Take 100 mg by mouth 2 times a day.  Dose: 100 mg              Allergies  Allergies   Allergen Reactions    Keppra [Levetiracetam] Unspecified     \"Increased seizures\" per patient       DIET  Orders Placed This Encounter   Procedures    Diet Order Diet: Clear Liquid     Standing Status:   Standing     Number of Occurrences:   1     Order Specific Question:   Diet:     Answer:   Clear Liquid [10]       ACTIVITY  As tolerated.  Weight bearing as tolerated    CONSULTATIONS  General surgery. GI    PROCEDURES      LABORATORY  Lab Results   Component Value Date    SODIUM 139 08/27/2023    POTASSIUM 3.5 (L) 08/27/2023    CHLORIDE 105 08/27/2023    CO2 22 08/27/2023    GLUCOSE 79 08/27/2023    BUN 5 (L) 08/27/2023    CREATININE 0.44 (L) 08/27/2023        Lab Results   Component Value Date    WBC 5.9 08/27/2023    HEMOGLOBIN 8.6 (L) 08/27/2023    HEMATOCRIT 28.5 (L) 08/27/2023    PLATELETCT 154 (L) 08/27/2023        Total time of the discharge process exceeds 32 minutes.  "

## 2023-08-27 NOTE — PROGRESS NOTES
Report received from Natasha GOYAL and care of patient assumed. Per Natasha, pre-op report has been given and patient does not require a CHG. Patient has been NPO since midnight. Patient is awaiting transportation down to pre-op.     [facility] called with regards to patient's ordered HIDA scan. Notified  David that patient will be going to pre-op shortly for her ERCP.

## 2023-08-27 NOTE — PROGRESS NOTES
DATE: 8/27/2023 8/19 Laparoscopic cholecystectomy.  8/25 Readmitted for abdominal pain most notably after meals. Elevated liver enzymes    INTERVAL EVENTS:  MRCP with mildly dilated CBD, 10mm.  No choledocholithiasis.   HIDA scan with no evidence of leak.   Transaminase trend down.   Abdominal tenderness with meals.    -No acute surgical interventions needed at this time.  MRCP demonstrates no stones and HIDA scan demonstrates no evidence of bile leak.  She may have passed a stone.  Patient is medically cleared at this time for discharge. Follow-up in clinic as needed.    REVIEW OF SYSTEMS:  Review of Systems   Gastrointestinal:  Positive for abdominal pain (with meals). Negative for nausea and vomiting.       PHYSICAL EXAMINATION:  Vital Signs: /71   Pulse (!) 54   Temp 36.6 °C (97.9 °F) (Temporal)   Resp 16   Wt 79.2 kg (174 lb 9.7 oz)   SpO2 96%   Physical Exam  Vitals and nursing note reviewed.   Constitutional:       General: She is not in acute distress.     Appearance: She is not ill-appearing, toxic-appearing or diaphoretic.   HENT:      Head: Normocephalic.      Right Ear: External ear normal.      Left Ear: External ear normal.      Mouth/Throat:      Mouth: Mucous membranes are dry.      Pharynx: Oropharynx is clear.   Eyes:      General:         Right eye: No discharge.         Left eye: No discharge.   Cardiovascular:      Rate and Rhythm: Normal rate.   Pulmonary:      Effort: No respiratory distress.   Chest:      Chest wall: No tenderness.   Abdominal:      General: There is no distension.      Tenderness: There is no abdominal tenderness. There is no guarding or rebound.   Musculoskeletal:      Comments: Moves all extremities    Skin:     General: Skin is warm and dry.      Capillary Refill: Capillary refill takes less than 2 seconds.   Neurological:      Mental Status: She is alert.   Psychiatric:         Mood and Affect: Mood normal.         Behavior: Behavior normal.          LABORATORY VALUES:   Recent Labs     08/25/23  1606 08/26/23  0047 08/27/23  0326   WBC 9.5 8.0 5.9   RBC 4.89 4.07* 4.09*   HEMOGLOBIN 10.3* 8.5* 8.6*   HEMATOCRIT 33.7* 28.2* 28.5*   MCV 68.9* 69.3* 69.7*   MCH 21.1* 20.9* 21.0*   MCHC 30.6* 30.1* 30.2*   RDW 45.6 45.4 46.9   PLATELETCT 251 181 154*   MPV 10.0 9.5 9.2     Recent Labs     08/25/23  1606 08/26/23  0047 08/27/23  0107   SODIUM 140 139 139   POTASSIUM 3.8 3.7 3.5*   CHLORIDE 104 106 105   CO2 20 21 22   GLUCOSE 87 81 79   BUN 8 7* 5*   CREATININE 0.50 0.47* 0.44*   CALCIUM 10.0 8.9 9.0     Recent Labs     08/25/23  1606 08/26/23  0047 08/27/23  0107   ASTSGOT 117* 83* 43   ALTSGPT 233* 176* 132*   TBILIRUBIN 1.0 0.7 0.4   ALKPHOSPHAT 1033* 867* 887*   GLOBULIN 3.2 2.4 2.7            IMAGING:   NM-HEPATOBILIARY SCAN   Final Result      1.  No scintigraphic evidence of bile leak.      OF-QPLUEHT-X/O   Final Result      1. Mildly dilated CBD, measuring up to 10 mm. No choledocholithiasis.      2. Redemonstration of collection in the gallbladder fossa, similar to prior. Again this could be postop seroma, hematoma or biloma.      CT-ABDOMEN-PELVIS WITH   Final Result         1.  Intra-abdominal free air, within expected limits for recent postop changes. Differential could include viscus perforation in the appropriate clinical setting.   2.  Low-density fluid collection the gallbladder fossa, could represent postop seroma, hematoma, or biloma. Early forming abscess is not radiographically excluded.

## 2023-08-27 NOTE — CARE PLAN
"The patient is Stable - Low risk of patient condition declining or worsening    Shift Goals  Clinical Goals: HIDA scan  Patient Goals: \"Find out what's going on\"  Family Goals: not currently present    Progress made toward(s) clinical / shift goals:  Patient is in the process of having HIDA scan completed.     Patient is not progressing towards the following goals: Discharge clearance is pending.    Problem: Pain - Standard  Goal: Alleviation of pain or a reduction in pain to the patient’s comfort goal  Outcome: Progressing     Problem: Knowledge Deficit - Standard  Goal: Patient and family/care givers will demonstrate understanding of plan of care, disease process/condition, diagnostic tests and medications  Outcome: Progressing         "

## 2023-08-27 NOTE — CARE PLAN
The patient is Stable - Low risk of patient condition declining or worsening    Shift Goals  Clinical Goals: Pain Control; HIDA Scan/ERCP  Patient Goals: Pain Control; Tolerate Diet  Family Goals: not present at this time    Progress made toward(s) clinical / shift goals:  Patient medicated per MAR. Non-pharmacologic comfort measures implemented. Safety discussed. Education provided. Ambulation and repositioning encouraged.     Problem: Pain - Standard  Goal: Alleviation of pain or a reduction in pain to the patient’s comfort goal  Outcome: Progressing     Problem: Knowledge Deficit - Standard  Goal: Patient and family/care givers will demonstrate understanding of plan of care, disease process/condition, diagnostic tests and medications  Outcome: Progressing

## 2023-08-27 NOTE — CARE PLAN
"The patient is Stable - Low risk of patient condition declining or worsening    Shift Goals  Clinical Goals: HIDA scan  Patient Goals: \"Find out what's going on\"  Family Goals: not currently present    Progress made toward(s) clinical / shift goals:  Patient's HIDA scan was completed. Patient has been cleared by [facility] and Hospitalist services to discharge home. Orders are in place.     Patient is not progressing towards the following goals: N/A    Problem: Pain - Standard  Goal: Alleviation of pain or a reduction in pain to the patient’s comfort goal  8/27/2023 1349 by Sherin Almeida R.N.  Outcome: Met     Problem: Knowledge Deficit - Standard  Goal: Patient and family/care givers will demonstrate understanding of plan of care, disease process/condition, diagnostic tests and medications  8/27/2023 1349 by Sherin Almeida R.N.  Outcome: Met         "

## 2023-08-27 NOTE — PROGRESS NOTES
This RN was contacted by Yarely GOYAL in [facility] Pre-[facility] and informed that patient's ERCP has been cancelled. This RN contacted David, Nuclear Medicine tech, regarding patient having her HIDA scan completed. Transportation will be scheduled by David. Patient has been updated.

## 2023-08-28 ENCOUNTER — APPOINTMENT (OUTPATIENT)
Dept: RADIOLOGY | Facility: MEDICAL CENTER | Age: 33
DRG: 446 | End: 2023-08-28
Attending: EMERGENCY MEDICINE

## 2023-08-28 ENCOUNTER — HOSPITAL ENCOUNTER (INPATIENT)
Facility: MEDICAL CENTER | Age: 33
LOS: 4 days | DRG: 446 | End: 2023-09-02
Attending: EMERGENCY MEDICINE | Admitting: HOSPITALIST

## 2023-08-28 DIAGNOSIS — Z90.49 S/P LAPAROSCOPIC CHOLECYSTECTOMY: ICD-10-CM

## 2023-08-28 DIAGNOSIS — R10.13 EPIGASTRIC PAIN: ICD-10-CM

## 2023-08-28 DIAGNOSIS — K80.00 ACUTE CALCULOUS CHOLECYSTITIS: ICD-10-CM

## 2023-08-28 DIAGNOSIS — R74.01 TRANSAMINITIS: ICD-10-CM

## 2023-08-28 DIAGNOSIS — R11.2 NAUSEA AND VOMITING, UNSPECIFIED VOMITING TYPE: ICD-10-CM

## 2023-08-28 DIAGNOSIS — K80.50 CHOLEDOCHOLITHIASIS: ICD-10-CM

## 2023-08-28 PROBLEM — R56.9 SEIZURES (HCC): Status: ACTIVE | Noted: 2023-08-28

## 2023-08-28 LAB
ABO + RH BLD: NORMAL
ABO GROUP BLD: NORMAL
ALBUMIN SERPL BCP-MCNC: 4 G/DL (ref 3.2–4.9)
ALBUMIN/GLOB SERPL: 1.6 G/DL
ALP SERPL-CCNC: 1110 U/L (ref 30–99)
ALT SERPL-CCNC: 150 U/L (ref 2–50)
ANION GAP SERPL CALC-SCNC: 11 MMOL/L (ref 7–16)
APTT PPP: 26.3 SEC (ref 24.7–36)
AST SERPL-CCNC: 104 U/L (ref 12–45)
BASOPHILS # BLD AUTO: 0.4 % (ref 0–1.8)
BASOPHILS # BLD: 0.03 K/UL (ref 0–0.12)
BILIRUB SERPL-MCNC: 0.4 MG/DL (ref 0.1–1.5)
BLD GP AB SCN SERPL QL: NORMAL
BUN SERPL-MCNC: 9 MG/DL (ref 8–22)
CALCIUM ALBUM COR SERPL-MCNC: 8.7 MG/DL (ref 8.5–10.5)
CALCIUM SERPL-MCNC: 8.7 MG/DL (ref 8.5–10.5)
CHLORIDE SERPL-SCNC: 102 MMOL/L (ref 96–112)
CO2 SERPL-SCNC: 25 MMOL/L (ref 20–33)
CREAT SERPL-MCNC: 0.51 MG/DL (ref 0.5–1.4)
EOSINOPHIL # BLD AUTO: 0.14 K/UL (ref 0–0.51)
EOSINOPHIL NFR BLD: 1.8 % (ref 0–6.9)
ERYTHROCYTE [DISTWIDTH] IN BLOOD BY AUTOMATED COUNT: 46.3 FL (ref 35.9–50)
GFR SERPLBLD CREATININE-BSD FMLA CKD-EPI: 127 ML/MIN/1.73 M 2
GLOBULIN SER CALC-MCNC: 2.5 G/DL (ref 1.9–3.5)
GLUCOSE SERPL-MCNC: 96 MG/DL (ref 65–99)
HCT VFR BLD AUTO: 33 % (ref 37–47)
HGB BLD-MCNC: 10.3 G/DL (ref 12–16)
IMM GRANULOCYTES # BLD AUTO: 0.03 K/UL (ref 0–0.11)
IMM GRANULOCYTES NFR BLD AUTO: 0.4 % (ref 0–0.9)
INR PPP: 1.05 (ref 0.87–1.13)
LIPASE SERPL-CCNC: 17 U/L (ref 11–82)
LYMPHOCYTES # BLD AUTO: 1.52 K/UL (ref 1–4.8)
LYMPHOCYTES NFR BLD: 19.3 % (ref 22–41)
MCH RBC QN AUTO: 21.5 PG (ref 27–33)
MCHC RBC AUTO-ENTMCNC: 31.2 G/DL (ref 32.2–35.5)
MCV RBC AUTO: 69 FL (ref 81.4–97.8)
MONOCYTES # BLD AUTO: 0.58 K/UL (ref 0–0.85)
MONOCYTES NFR BLD AUTO: 7.4 % (ref 0–13.4)
NEUTROPHILS # BLD AUTO: 5.59 K/UL (ref 1.82–7.42)
NEUTROPHILS NFR BLD: 70.7 % (ref 44–72)
NRBC # BLD AUTO: 0 K/UL
NRBC BLD-RTO: 0 /100 WBC (ref 0–0.2)
PLATELET # BLD AUTO: 181 K/UL (ref 164–446)
PMV BLD AUTO: 10.1 FL (ref 9–12.9)
POTASSIUM SERPL-SCNC: 3.5 MMOL/L (ref 3.6–5.5)
PROT SERPL-MCNC: 6.5 G/DL (ref 6–8.2)
PROTHROMBIN TIME: 13.8 SEC (ref 12–14.6)
RBC # BLD AUTO: 4.78 M/UL (ref 4.2–5.4)
RH BLD: NORMAL
SODIUM SERPL-SCNC: 138 MMOL/L (ref 135–145)
WBC # BLD AUTO: 7.9 K/UL (ref 4.8–10.8)

## 2023-08-28 PROCEDURE — 85730 THROMBOPLASTIN TIME PARTIAL: CPT

## 2023-08-28 PROCEDURE — 99285 EMERGENCY DEPT VISIT HI MDM: CPT

## 2023-08-28 PROCEDURE — 700102 HCHG RX REV CODE 250 W/ 637 OVERRIDE(OP): Performed by: EMERGENCY MEDICINE

## 2023-08-28 PROCEDURE — 96365 THER/PROPH/DIAG IV INF INIT: CPT

## 2023-08-28 PROCEDURE — 76705 ECHO EXAM OF ABDOMEN: CPT

## 2023-08-28 PROCEDURE — 700111 HCHG RX REV CODE 636 W/ 250 OVERRIDE (IP): Mod: JZ | Performed by: EMERGENCY MEDICINE

## 2023-08-28 PROCEDURE — 96376 TX/PRO/DX INJ SAME DRUG ADON: CPT

## 2023-08-28 PROCEDURE — 86901 BLOOD TYPING SEROLOGIC RH(D): CPT

## 2023-08-28 PROCEDURE — 700105 HCHG RX REV CODE 258: Performed by: EMERGENCY MEDICINE

## 2023-08-28 PROCEDURE — A9270 NON-COVERED ITEM OR SERVICE: HCPCS | Performed by: EMERGENCY MEDICINE

## 2023-08-28 PROCEDURE — 36415 COLL VENOUS BLD VENIPUNCTURE: CPT

## 2023-08-28 PROCEDURE — A9270 NON-COVERED ITEM OR SERVICE: HCPCS | Performed by: HOSPITALIST

## 2023-08-28 PROCEDURE — G0378 HOSPITAL OBSERVATION PER HR: HCPCS

## 2023-08-28 PROCEDURE — 86850 RBC ANTIBODY SCREEN: CPT

## 2023-08-28 PROCEDURE — 83690 ASSAY OF LIPASE: CPT

## 2023-08-28 PROCEDURE — 99222 1ST HOSP IP/OBS MODERATE 55: CPT | Performed by: NURSE PRACTITIONER

## 2023-08-28 PROCEDURE — 700102 HCHG RX REV CODE 250 W/ 637 OVERRIDE(OP): Performed by: HOSPITALIST

## 2023-08-28 PROCEDURE — 71045 X-RAY EXAM CHEST 1 VIEW: CPT

## 2023-08-28 PROCEDURE — 99223 1ST HOSP IP/OBS HIGH 75: CPT | Performed by: HOSPITALIST

## 2023-08-28 PROCEDURE — 86900 BLOOD TYPING SEROLOGIC ABO: CPT

## 2023-08-28 PROCEDURE — 96375 TX/PRO/DX INJ NEW DRUG ADDON: CPT

## 2023-08-28 PROCEDURE — 700111 HCHG RX REV CODE 636 W/ 250 OVERRIDE (IP): Performed by: HOSPITALIST

## 2023-08-28 PROCEDURE — 700105 HCHG RX REV CODE 258: Performed by: HOSPITALIST

## 2023-08-28 PROCEDURE — 85610 PROTHROMBIN TIME: CPT

## 2023-08-28 PROCEDURE — 80053 COMPREHEN METABOLIC PANEL: CPT

## 2023-08-28 PROCEDURE — 96374 THER/PROPH/DIAG INJ IV PUSH: CPT

## 2023-08-28 PROCEDURE — 96366 THER/PROPH/DIAG IV INF ADDON: CPT

## 2023-08-28 PROCEDURE — 85025 COMPLETE CBC W/AUTO DIFF WBC: CPT

## 2023-08-28 RX ORDER — POTASSIUM CHLORIDE 7.45 MG/ML
10 INJECTION INTRAVENOUS
Status: COMPLETED | OUTPATIENT
Start: 2023-08-28 | End: 2023-08-28

## 2023-08-28 RX ORDER — ONDANSETRON 4 MG/1
4 TABLET, ORALLY DISINTEGRATING ORAL EVERY 4 HOURS PRN
Status: DISCONTINUED | OUTPATIENT
Start: 2023-08-28 | End: 2023-09-02 | Stop reason: HOSPADM

## 2023-08-28 RX ORDER — POLYETHYLENE GLYCOL 3350 17 G/17G
1 POWDER, FOR SOLUTION ORAL
Status: DISCONTINUED | OUTPATIENT
Start: 2023-08-28 | End: 2023-09-02 | Stop reason: HOSPADM

## 2023-08-28 RX ORDER — HYDROMORPHONE HYDROCHLORIDE 1 MG/ML
0.5 INJECTION, SOLUTION INTRAMUSCULAR; INTRAVENOUS; SUBCUTANEOUS
Status: DISCONTINUED | OUTPATIENT
Start: 2023-08-28 | End: 2023-08-28

## 2023-08-28 RX ORDER — METOCLOPRAMIDE HYDROCHLORIDE 5 MG/ML
10 INJECTION INTRAMUSCULAR; INTRAVENOUS EVERY 6 HOURS
Status: DISCONTINUED | OUTPATIENT
Start: 2023-08-28 | End: 2023-08-29

## 2023-08-28 RX ORDER — LAMOTRIGINE 100 MG/1
100 TABLET ORAL 2 TIMES DAILY
Status: DISCONTINUED | OUTPATIENT
Start: 2023-08-28 | End: 2023-09-02 | Stop reason: HOSPADM

## 2023-08-28 RX ORDER — OXYCODONE HYDROCHLORIDE 5 MG/1
5 TABLET ORAL
Status: DISCONTINUED | OUTPATIENT
Start: 2023-08-28 | End: 2023-08-28

## 2023-08-28 RX ORDER — PROCHLORPERAZINE EDISYLATE 5 MG/ML
5-10 INJECTION INTRAMUSCULAR; INTRAVENOUS EVERY 4 HOURS PRN
Status: DISCONTINUED | OUTPATIENT
Start: 2023-08-28 | End: 2023-09-02 | Stop reason: HOSPADM

## 2023-08-28 RX ORDER — SODIUM CHLORIDE, SODIUM LACTATE, POTASSIUM CHLORIDE, CALCIUM CHLORIDE 600; 310; 30; 20 MG/100ML; MG/100ML; MG/100ML; MG/100ML
INJECTION, SOLUTION INTRAVENOUS CONTINUOUS
Status: DISCONTINUED | OUTPATIENT
Start: 2023-08-28 | End: 2023-08-30

## 2023-08-28 RX ORDER — PROMETHAZINE HYDROCHLORIDE 25 MG/1
12.5-25 SUPPOSITORY RECTAL EVERY 4 HOURS PRN
Status: DISCONTINUED | OUTPATIENT
Start: 2023-08-28 | End: 2023-09-02 | Stop reason: HOSPADM

## 2023-08-28 RX ORDER — SODIUM CHLORIDE, SODIUM LACTATE, POTASSIUM CHLORIDE, CALCIUM CHLORIDE 600; 310; 30; 20 MG/100ML; MG/100ML; MG/100ML; MG/100ML
1000 INJECTION, SOLUTION INTRAVENOUS ONCE
Status: COMPLETED | OUTPATIENT
Start: 2023-08-28 | End: 2023-08-28

## 2023-08-28 RX ORDER — PROMETHAZINE HYDROCHLORIDE 25 MG/1
12.5-25 TABLET ORAL EVERY 4 HOURS PRN
Status: DISCONTINUED | OUTPATIENT
Start: 2023-08-28 | End: 2023-09-02 | Stop reason: HOSPADM

## 2023-08-28 RX ORDER — OXYCODONE HYDROCHLORIDE 10 MG/1
10 TABLET ORAL
Status: DISCONTINUED | OUTPATIENT
Start: 2023-08-28 | End: 2023-08-28

## 2023-08-28 RX ORDER — HYDROMORPHONE HYDROCHLORIDE 1 MG/ML
0.5 INJECTION, SOLUTION INTRAMUSCULAR; INTRAVENOUS; SUBCUTANEOUS
Status: DISCONTINUED | OUTPATIENT
Start: 2023-08-28 | End: 2023-09-01

## 2023-08-28 RX ORDER — ONDANSETRON 2 MG/ML
4 INJECTION INTRAMUSCULAR; INTRAVENOUS EVERY 4 HOURS PRN
Status: DISCONTINUED | OUTPATIENT
Start: 2023-08-28 | End: 2023-09-02 | Stop reason: HOSPADM

## 2023-08-28 RX ORDER — BISACODYL 10 MG
10 SUPPOSITORY, RECTAL RECTAL
Status: DISCONTINUED | OUTPATIENT
Start: 2023-08-28 | End: 2023-09-02 | Stop reason: HOSPADM

## 2023-08-28 RX ORDER — AMOXICILLIN 250 MG
2 CAPSULE ORAL 2 TIMES DAILY
Status: DISCONTINUED | OUTPATIENT
Start: 2023-08-28 | End: 2023-09-02 | Stop reason: HOSPADM

## 2023-08-28 RX ORDER — METOCLOPRAMIDE HYDROCHLORIDE 5 MG/ML
10 INJECTION INTRAMUSCULAR; INTRAVENOUS ONCE
Status: COMPLETED | OUTPATIENT
Start: 2023-08-28 | End: 2023-08-28

## 2023-08-28 RX ADMIN — METOCLOPRAMIDE 10 MG: 5 INJECTION, SOLUTION INTRAMUSCULAR; INTRAVENOUS at 14:05

## 2023-08-28 RX ADMIN — SODIUM CHLORIDE, POTASSIUM CHLORIDE, SODIUM LACTATE AND CALCIUM CHLORIDE 1000 ML: 600; 310; 30; 20 INJECTION, SOLUTION INTRAVENOUS at 08:42

## 2023-08-28 RX ADMIN — HYDROMORPHONE HYDROCHLORIDE 0.5 MG: 1 INJECTION, SOLUTION INTRAMUSCULAR; INTRAVENOUS; SUBCUTANEOUS at 22:20

## 2023-08-28 RX ADMIN — SENNOSIDES AND DOCUSATE SODIUM 2 TABLET: 50; 8.6 TABLET ORAL at 18:38

## 2023-08-28 RX ADMIN — FAMOTIDINE 20 MG: 10 INJECTION INTRAVENOUS at 18:38

## 2023-08-28 RX ADMIN — METOCLOPRAMIDE 10 MG: 5 INJECTION, SOLUTION INTRAMUSCULAR; INTRAVENOUS at 08:44

## 2023-08-28 RX ADMIN — LIDOCAINE HYDROCHLORIDE 30 ML: 20 SOLUTION OROPHARYNGEAL at 10:51

## 2023-08-28 RX ADMIN — POTASSIUM CHLORIDE 10 MEQ: 7.46 INJECTION, SOLUTION INTRAVENOUS at 14:20

## 2023-08-28 RX ADMIN — SODIUM CHLORIDE, POTASSIUM CHLORIDE, SODIUM LACTATE AND CALCIUM CHLORIDE: 600; 310; 30; 20 INJECTION, SOLUTION INTRAVENOUS at 22:23

## 2023-08-28 RX ADMIN — POTASSIUM CHLORIDE 10 MEQ: 7.46 INJECTION, SOLUTION INTRAVENOUS at 15:56

## 2023-08-28 RX ADMIN — METOCLOPRAMIDE 10 MG: 5 INJECTION, SOLUTION INTRAMUSCULAR; INTRAVENOUS at 22:16

## 2023-08-28 RX ADMIN — SODIUM CHLORIDE, POTASSIUM CHLORIDE, SODIUM LACTATE AND CALCIUM CHLORIDE: 600; 310; 30; 20 INJECTION, SOLUTION INTRAVENOUS at 14:16

## 2023-08-28 ASSESSMENT — LIFESTYLE VARIABLES
HAVE PEOPLE ANNOYED YOU BY CRITICIZING YOUR DRINKING: NO
TOTAL SCORE: 0
EVER FELT BAD OR GUILTY ABOUT YOUR DRINKING: NO
HAVE YOU EVER FELT YOU SHOULD CUT DOWN ON YOUR DRINKING: NO
AVERAGE NUMBER OF DAYS PER WEEK YOU HAVE A DRINK CONTAINING ALCOHOL: 0
TOTAL SCORE: 0
ON A TYPICAL DAY WHEN YOU DRINK ALCOHOL HOW MANY DRINKS DO YOU HAVE: 0
EVER HAD A DRINK FIRST THING IN THE MORNING TO STEADY YOUR NERVES TO GET RID OF A HANGOVER: NO
HOW MANY TIMES IN THE PAST YEAR HAVE YOU HAD 5 OR MORE DRINKS IN A DAY: 0
ALCOHOL_USE: NO
TOTAL SCORE: 0
CONSUMPTION TOTAL: NEGATIVE
SUBSTANCE_ABUSE: 0
DO YOU DRINK ALCOHOL: NO

## 2023-08-28 ASSESSMENT — ENCOUNTER SYMPTOMS
SORE THROAT: 0
TREMORS: 0
FLANK PAIN: 0
HEADACHES: 0
EYE PAIN: 0
ORTHOPNEA: 0
COUGH: 0
SPEECH CHANGE: 0
FEVER: 0
FALLS: 0
SPUTUM PRODUCTION: 0
WEAKNESS: 0
SHORTNESS OF BREATH: 0
NECK PAIN: 0
DEPRESSION: 0
NAUSEA: 1
MYALGIAS: 0
PHOTOPHOBIA: 0
TINGLING: 0
CHILLS: 0
BLURRED VISION: 0
PALPITATIONS: 0
INSOMNIA: 0
VOMITING: 1
DOUBLE VISION: 0
BACK PAIN: 0
ABDOMINAL PAIN: 1
CLAUDICATION: 0
DIZZINESS: 0
BLOOD IN STOOL: 0
SENSORY CHANGE: 0
NERVOUS/ANXIOUS: 0
CONSTIPATION: 0
DIARRHEA: 0
HEARTBURN: 0
HEMOPTYSIS: 0

## 2023-08-28 ASSESSMENT — PATIENT HEALTH QUESTIONNAIRE - PHQ9
2. FEELING DOWN, DEPRESSED, IRRITABLE, OR HOPELESS: NOT AT ALL
SUM OF ALL RESPONSES TO PHQ9 QUESTIONS 1 AND 2: 0
1. LITTLE INTEREST OR PLEASURE IN DOING THINGS: NOT AT ALL
SUM OF ALL RESPONSES TO PHQ9 QUESTIONS 1 AND 2: 0
2. FEELING DOWN, DEPRESSED, IRRITABLE, OR HOPELESS: NOT AT ALL
1. LITTLE INTEREST OR PLEASURE IN DOING THINGS: NOT AT ALL

## 2023-08-28 ASSESSMENT — FIBROSIS 4 INDEX
FIB4 SCORE: 0.78
FIB4 SCORE: 1.5
FIB4 SCORE: 1.5

## 2023-08-28 ASSESSMENT — PAIN DESCRIPTION - PAIN TYPE
TYPE: ACUTE PAIN
TYPE: ACUTE PAIN

## 2023-08-28 NOTE — ASSESSMENT & PLAN NOTE
S/p cholecystectomy 8/19  Right upper quadrant ultrasound showed interval cholecystectomy with again noted 3.9 x 2.8 x 2.7 cm fluid collection in the gallbladder fossa

## 2023-08-28 NOTE — CONSULTS
Date of Consultation:  8/28/2023    Patient: : Yenni Nieto  MRN: [id number]    Referring Physician:  Acosta Garcia MD     GI:MIYA SternPEVGENY     Reason for Consultation: abdominal pain, hematemesis    History of Present Illness:     This is a pleasant 33-year-old female s/p laproscopic cholecystectomy 8/19/2023 who presented to [facility] recently admitted to [facility] 8/25/2023-8/27/2023 for abdominal pain and transaminitis.  On initial arrival on 8/25/2023, transaminitis with , , alk phos 1033, total bilirubin normal at 1.  Throughout admission, LFTs downtrending.  Total bilirubin normal.  Additionally, CT abdomen pelvis demonstrated fluid collection in gallbladder fossa.  Follow-up MRCP was done showing mildly dilated common bile duct measuring up to 10 mm, no choledocholithiasis.  There is redemonstration of collection in the gallbladder fossa possibly postop seroma, hematoma, biloma.  HIDA scan also obtained negative for bile leak.  Patient states that while inpatient, she had tolerated clear liquid diet but was having pain with advancement to full liquid diet.  Patient was discharged home on 8/27/2023 however continued to have abdominal pain, worse with eating chicken, as well as vomiting.  She states she had a single episode of of dark brown hematemesis.  She denied seeing bright red blood.  Also denied melena, hematochezia.  Due to ongoing pain and hematemesis, she sought care in the emergency department.  No leukocytosis, hemoglobin increased from 8.6 yesterday to 10.3 today.  Platelets normal.  , , alk phos 1110, total bilirubin normal at 0.4, lipase 17, INR 1.05.  US-RUQ showed 3.9 x 2.8 x 2.7 cm fluid collection in the gallbladder fossa could be seroma, hematoma, biloma.  Infection was not included.      Tobacco use: Denies  Alcohol use: Denies  Illicit drug use: Denies    Last EGD: Denies  Last colonoscopy:  "Denies  NSAID/ASA use: Patient states he had ibuprofen 400 mg p.o. 8/27/2023 evening time  Anticoagulation use: None      Past Medical History:   Diagnosis Date    Seizures (HCC)          Past Surgical History:   Procedure Laterality Date    FERNANDO BY LAPAROSCOPY  8/19/2023    Procedure: CHOLECYSTECTOMY, LAPAROSCOPIC;  Surgeon: Ky Ward M.D.;  Location: SURGERY [facility];  Service: General       History reviewed. No pertinent family history.    Social History     Socioeconomic History    Marital status: Single   Tobacco Use    Smoking status: Never    Smokeless tobacco: Never   Vaping Use    Vaping Use: Never used   Substance and Sexual Activity    Alcohol use: Never    Drug use: Never       Review of systems:  Review of Systems   Constitutional:  Negative for chills, fever and malaise/fatigue.   HENT:  Negative for congestion and sore throat.    Respiratory:  Negative for cough and shortness of breath.    Cardiovascular:  Negative for chest pain and leg swelling.   Gastrointestinal:  Positive for abdominal pain, nausea and vomiting (Single episode of hematemesis). Negative for blood in stool, constipation, diarrhea and melena.   Genitourinary:  Negative for dysuria and flank pain.   Musculoskeletal:  Negative for falls and myalgias.   Neurological:  Negative for weakness and headaches.   Psychiatric/Behavioral:  Negative for substance abuse. The patient is not nervous/anxious and does not have insomnia.    All other systems reviewed and are negative.        Physical Exam:  Vitals:    08/28/23 1032 08/28/23 1132 08/28/23 1302 08/28/23 1329   BP: 93/46 92/53 90/55 97/53   Pulse: 63 61 (!) 50 67   Resp: 15 14 15 14   Temp:    36.3 °C (97.3 °F)   TempSrc:    Temporal   SpO2: 99% 95% 98% 98%   Weight:    78.2 kg (172 lb 6.4 oz)   Height:    1.651 m (5' 5\")       Physical Exam  Vitals and nursing note reviewed.   Constitutional:       General: She is awake. She is not in acute distress.     Appearance: " Normal appearance. She is well-developed, well-groomed and overweight. She is not ill-appearing.   HENT:      Head: Normocephalic.      Nose: Nose normal. No congestion.      Mouth/Throat:      Mouth: Mucous membranes are moist.      Pharynx: Oropharynx is clear. No oropharyngeal exudate.   Eyes:      General: No scleral icterus.     Extraocular Movements: Extraocular movements intact.      Conjunctiva/sclera: Conjunctivae normal.   Cardiovascular:      Rate and Rhythm: Normal rate and regular rhythm.      Pulses: Normal pulses.      Heart sounds: Normal heart sounds.   Pulmonary:      Effort: Pulmonary effort is normal. No respiratory distress.      Breath sounds: Normal breath sounds.   Abdominal:      General: Abdomen is flat. Bowel sounds are normal. There is no distension.      Palpations: Abdomen is soft.      Tenderness: There is abdominal tenderness. There is no guarding.      Comments: Laparoscopic incision sites well approximated without erythema, edema, drainage.   Musculoskeletal:      Right lower leg: No edema.      Left lower leg: No edema.   Skin:     General: Skin is warm and dry.      Capillary Refill: Capillary refill takes less than 2 seconds.      Coloration: Skin is not jaundiced or pale.   Neurological:      General: No focal deficit present.      Mental Status: She is alert, oriented to person, place, and time and easily aroused. Mental status is at baseline.      Motor: No weakness.   Psychiatric:         Mood and Affect: Mood normal.         Behavior: Behavior normal. Behavior is cooperative.         Thought Content: Thought content normal.         Judgment: Judgment normal.           Labs:  Recent Labs     08/26/23  0047 08/27/23  0326 08/28/23  0755   WBC 8.0 5.9 7.9   RBC 4.07* 4.09* 4.78   HEMOGLOBIN 8.5* 8.6* 10.3*   HEMATOCRIT 28.2* 28.5* 33.0*   MCV 69.3* 69.7* 69.0*   MCH 20.9* 21.0* 21.5*   MCHC 30.1* 30.2* 31.2*   RDW 45.4 46.9 46.3   PLATELETCT 181 154* 181   MPV 9.5 9.2 10.1      Recent Labs     08/26/23  0047 08/27/23  0107 08/28/23  0755   SODIUM 139 139 138   POTASSIUM 3.7 3.5* 3.5*   CHLORIDE 106 105 102   CO2 21 22 25   GLUCOSE 81 79 96   BUN 7* 5* 9     Recent Labs     08/28/23  0755   APTT 26.3   INR 1.05       Recent Labs     08/26/23  0047 08/27/23  0107 08/28/23  0755   ASTSGOT 83* 43 104*   ALTSGPT 176* 132* 150*   TBILIRUBIN 0.7 0.4 0.4   ALKPHOSPHAT 867* 887* 1110*   GLOBULIN 2.4 2.7 2.5   INR  --   --  1.05         Imaging:  US-RUQ  Narrative: 8/28/2023 12:38 PM    HISTORY/REASON FOR EXAM:  Pain  Abdominal pain    TECHNIQUE/EXAM DESCRIPTION AND NUMBER OF VIEWS:  Real-time sonography of the liver and biliary tree.    COMPARISON: 8/19/2023    FINDINGS:  The liver is normal in contour. There is no evidence of solid mass lesion. The liver measures 16.17 cm.    The gallbladder has been removed. There is a 3.9 x 2.8 x 2.7 cm fluid collection in the gallbladder fossa. There is trace perihepatic fluid. The common duct measures 4.60 mm.    The visualized pancreas is unremarkable.  The visualized aorta is normal in caliber.    Intrahepatic IVC is patent.    The portal vein is patent with hepatopetal flow. The MPV measures 0.73 cm.    The right kidney measures 10.31 cm.    There is no ascites.  Impression: 1.  Interval cholecystectomy.  2.  3.9 by 2.8 x 2.7 cm fluid collection in the gallbladder fossa could be a seroma, hematoma or biloma. Infection is not excluded.  DX-CHEST-PORTABLE (1 VIEW)  Narrative: 8/28/2023 7:44 AM    HISTORY/REASON FOR EXAM:  Blood in vomit or stool or dark tarry stool.  Hemoptysis    TECHNIQUE/EXAM DESCRIPTION AND NUMBER OF VIEWS:  Single portable view of the chest.    COMPARISON: None    FINDINGS:    Cardiomediastinal silhouette is normal.    No focal consolidation, pleural effusion, pulmonary edema or pneumothorax.    No acute osseous abnormality.  Impression: No acute cardiopulmonary abnormality.            Impressions:  Abdominal pain s/p  cholecystectomy-fluid collection in gallbladder fossa  Transaminitis-total bilirubin normal.      MDM:  This is a pleasant 33-year-old female with s/p laparoscopic cholecystectomy 8/19/2023, recent admission 8/25/2023-8/27/2023 who presents back to [facility] on 8/28/2023 for severe right upper quadrant abdominal pain, nausea, vomiting, blood in vomit. No leukocytosis, hemoglobin increased from 8.6 yesterday to 10.3 today.  Platelets normal.  , , alk phos 1110, total bilirubin normal at 0.4, lipase 17, INR 1.05.  US-RUQ showed 3.9 x 2.8 x 2.7 cm fluid collection in the gallbladder fossa could be seroma, hematoma, biloma.  Infection was not included. In regards to her hematemesis and abdominal pain, discussed further evaluation with EGD. She is agreeable to this.  Additionally, would recommend reaching out to general surgery for evaluation of fluid collection in gallbladder fossa.    Recommendations:  Trend LFTs  Diaper primary  N.p.o. midnight.  Plan for EGD tomorrow.      This note was generated using voice recognition software which has a small chance of producing errors of grammar and possibly content. I have made every reasonable attempt to find and correct any obvious errors, but expect that some may not be found prior to finalization of this note.

## 2023-08-28 NOTE — ASSESSMENT & PLAN NOTE
Likely sec to choledocholithiasis   ERCP 8/31 noted stone was impacted at the ampulla. Treated with biliary sphincterotomy, calculi removal, pancreatic duct stent placement, biliary duct stent placement.  Gastric empty study: delayed. Likely due to severe abdominal pain  Antiemetics

## 2023-08-28 NOTE — H&P
Hospital Medicine History & Physical Note    Date of Service  8/28/2023    Primary Care Physician  Pcp Pt States None    Consultants  GI    Specialist Names: racca    Code Status  Full Code    Chief Complaint  Chief Complaint   Patient presents with    Blood in Vomit     Reports emesis x 1800 yesterday x 1 episode no blood. Blood in emesis x 1 at 0530. Reports blood: dark brown and bright red, pt reports amount of blood as large. Pt DC home from [facility] yesterday, dx of abd pain. S/p sx procedure; removal of gallbladder. Unable to tolerate PO liquid/solids since DC from Hospital.       History of Presenting Illness  Yenni Nieto is a 33 y.o. female who presented 8/28/2023 with recent cholecystectomy and recent (24 hour ago )discharge presents back to the hospital with intractable pain after eating.  She states that about 1 hour after eating she has very sharp pain in the epigastric area . she is nauseous and vomiting and cannot keep anything down .she got concerned and came back to the emergency department.  Patient referred to me for the care and management.  Dr. Escobar of gastroenterology is aware this patient from her previous recent hospitalization  and will be consulting for further recommendations.    MRCP with mildly dilated CBD, 10mm.  No choledocholithiasis.   HIDA scan with no evidence of leak.     No Fever no chills    Patient was seen on the surgical service on her previous admission 24 hours ago and was cleared her as there was no surgical intervention needed at the time    Ct shows There is low-density fluid collection identified within the gallbladder fossa measuring 4.4 x 1.9 cm.    I discussed the plan of care with patient.    Review of Systems  Review of Systems   Constitutional:  Positive for malaise/fatigue. Negative for chills and fever.   HENT:  Negative for ear pain, hearing loss and tinnitus.    Eyes:  Negative for blurred vision, double vision, photophobia and pain.   Respiratory:  " Negative for hemoptysis and sputum production.    Cardiovascular:  Negative for chest pain, palpitations, orthopnea and claudication.   Gastrointestinal:  Positive for abdominal pain, nausea and vomiting. Negative for heartburn.   Genitourinary:  Negative for dysuria, frequency and urgency.   Musculoskeletal:  Negative for back pain, myalgias and neck pain.   Neurological:  Negative for dizziness, tingling, tremors, sensory change, speech change and headaches.   Psychiatric/Behavioral:  Negative for depression, substance abuse and suicidal ideas.        Past Medical History   has a past medical history of Seizures (HCC).    Surgical History   has a past surgical history that includes simon by laparoscopy (8/19/2023).     Family History    Family history reviewed with patient. There is no family history that is pertinent to the chief complaint.     Social History   reports that she has never smoked. She has never used smokeless tobacco. She reports that she does not drink alcohol and does not use drugs.    Allergies  Allergies   Allergen Reactions    Keppra [Levetiracetam] Unspecified     \"Increased seizures\" per patient       Medications  Prior to Admission Medications   Prescriptions Last Dose Informant Patient Reported? Taking?   ferrous sulfate 325 (65 Fe) MG tablet New RX at Not started Patient No No   Sig: Take 1 Tablet by mouth every morning with breakfast for 30 days.   ibuprofen (ADVIL) 200 MG Tab 8/27/2023 at 1800 Patient Yes No   Sig: Take 400 mg by mouth every 6 hours as needed for Mild Pain.   lamoTRIgine (LAMICTAL) 100 MG Tab 8/28/2023 at 0600 Patient Yes No   Sig: Take 100 mg by mouth 2 times a day.   oxyCODONE immediate release (ROXICODONE) 10 MG immediate release tablet 8/28/2023 at 0300 Patient No No   Sig: Take 1 Tablet by mouth every 6 hours as needed for Severe Pain for up to 5 days.      Facility-Administered Medications: None       Physical Exam  Temp:  [35.9 °C (96.6 °F)-36.3 °C (97.3 °F)] " 36.3 °C (97.3 °F)  Pulse:  [50-89] 67  Resp:  [14-18] 14  BP: ()/(46-74) 97/53  SpO2:  [95 %-99 %] 98 %  Blood Pressure: 97/53   Temperature: 36.3 °C (97.3 °F)   Pulse: 67   Respiration: 14   Pulse Oximetry: 98 %       Physical Exam  Constitutional:       General: She is not in acute distress.     Appearance: Normal appearance. She is not ill-appearing, toxic-appearing or diaphoretic.   HENT:      Head: Normocephalic.      Mouth/Throat:      Mouth: Mucous membranes are moist.   Eyes:      General: No scleral icterus.        Left eye: No discharge.      Extraocular Movements: Extraocular movements intact.      Pupils: Pupils are equal, round, and reactive to light.   Cardiovascular:      Rate and Rhythm: Normal rate and regular rhythm.      Heart sounds: No murmur heard.     No friction rub. No gallop.   Pulmonary:      Effort: Pulmonary effort is normal. No respiratory distress.      Breath sounds: No stridor. No wheezing, rhonchi or rales.   Chest:      Chest wall: No tenderness.   Abdominal:      General: There is no distension.      Palpations: There is no mass.      Tenderness: There is no abdominal tenderness. There is no guarding.   Musculoskeletal:         General: No swelling, tenderness or deformity. Normal range of motion.      Cervical back: Normal range of motion. No rigidity.      Right lower leg: No edema.   Lymphadenopathy:      Cervical: No cervical adenopathy.   Skin:     Capillary Refill: Capillary refill takes 2 to 3 seconds.      Coloration: Skin is not jaundiced or pale.      Findings: No bruising, erythema or lesion.   Neurological:      General: No focal deficit present.      Mental Status: She is alert and oriented to person, place, and time.      Cranial Nerves: No cranial nerve deficit.      Motor: No weakness.      Gait: Gait normal.   Psychiatric:         Mood and Affect: Mood normal.         Behavior: Behavior normal.       Laboratory:  Recent Labs     08/26/23  0047 08/27/23  0327  "08/28/23  0755   WBC 8.0 5.9 7.9   RBC 4.07* 4.09* 4.78   HEMOGLOBIN 8.5* 8.6* 10.3*   HEMATOCRIT 28.2* 28.5* 33.0*   MCV 69.3* 69.7* 69.0*   MCH 20.9* 21.0* 21.5*   MCHC 30.1* 30.2* 31.2*   RDW 45.4 46.9 46.3   PLATELETCT 181 154* 181   MPV 9.5 9.2 10.1     Recent Labs     08/26/23  0047 08/27/23  0107 08/28/23  0755   SODIUM 139 139 138   POTASSIUM 3.7 3.5* 3.5*   CHLORIDE 106 105 102   CO2 21 22 25   GLUCOSE 81 79 96   BUN 7* 5* 9   CREATININE 0.47* 0.44* 0.51   CALCIUM 8.9 9.0 8.7     Recent Labs     08/25/23  1606 08/26/23  0047 08/27/23  0107 08/28/23  0755   ALTSGPT 233* 176* 132* 150*   ASTSGOT 117* 83* 43 104*   ALKPHOSPHAT 1033* 867* 887* 1110*   TBILIRUBIN 1.0 0.7 0.4 0.4   LIPASE 23  --   --  17   GLUCOSE 87 81 79 96     Recent Labs     08/28/23  0755   APTT 26.3   INR 1.05     No results for input(s): \"NTPROBNP\" in the last 72 hours.      No results for input(s): \"TROPONINT\" in the last 72 hours.    Imaging:  US-RUQ   Final Result      1.  Interval cholecystectomy.   2.  3.9 by 2.8 x 2.7 cm fluid collection in the gallbladder fossa could be a seroma, hematoma or biloma. Infection is not excluded.      DX-CHEST-PORTABLE (1 VIEW)   Final Result      No acute cardiopulmonary abnormality.          X-Ray:  I have personally reviewed the images and compared with prior images.    Assessment/Plan:  Justification for Admission Status  I anticipate this patient is appropriate for observation status at this time because I suspect patient will require less than 48 hours for further evaluation and treatment of her intractable nausea vomiting and abdominal pain          * Intractable nausea and vomiting- (present on admission)  Assessment & Plan  As needed Zofran      IV Reglan and Pepcid with IV    Hydration    Hypokalemia- (present on admission)  Assessment & Plan  Replace iv    Follow bmp    S/P laparoscopic cholecystectomy- (present on admission)  Assessment & Plan  Surgery team cleared the patient 24 hours ago "   Re Consult surgery  if warranted    GI MD to follow and make further recommendations==> egd on 8/29    Seizures (HCC)- (present on admission)  Assessment & Plan  lamictal      Iron deficiency anemia- (present on admission)  Assessment & Plan  Replace po once able to tolerate    Elevated liver enzymes- (present on admission)  Assessment & Plan  Hydrate    Follow lfts    Avoid tylenol        VTE prophylaxis: SCDs/TEDs

## 2023-08-28 NOTE — ED NOTES
Med rec completed per patient  Allergies reviewed  No PO Antibiotics in the last 30 days     Patient states she needs her second dose of Lamotrigine by 1700 or she will not be able to sleep tonight.

## 2023-08-28 NOTE — ED TRIAGE NOTES
"Chief Complaint   Patient presents with    Blood in Vomit     Reports emesis x 1800 yesterday x 1 episode no blood. Blood in emesis x 1 at 0530. Reports blood: dark brown and bright red, pt reports amount of blood as large. Pt DC home from [facility] yesterday, dx of abd pain. S/p sx procedure; removal of gallbladder.        34 y/o female ambulatory to triage for above complaint. Pt reports generalized weakness x 0530 this morning. Aaox4. Ambulatory w/ steady gait. Unable to tolerate PO fluids/solids since DC home yesterday. Gallbladder removal sx 8/19/2023    Pt place in waiting area. Educated on triage process. Pt will inform staff of any medical changes.    /74   Pulse 89   Temp 35.9 °C (96.6 °F) (Temporal)   Resp 18   Ht 1.651 m (5' 5\")   Wt 78.9 kg (174 lb)   LMP 08/23/2023 (Exact Date)   SpO2 98%   BMI 28.96 kg/m²     "

## 2023-08-28 NOTE — ASSESSMENT & PLAN NOTE
Due to choledocholithiasis.   S/p ERCP 8/31 noted stone was impacted at the ampulla. Treated with biliary sphincterotomy, calculi removal, pancreatic duct stent placement, biliary duct stent placement.  Repeat ERCP in 4 to 8 weeks for stent removal

## 2023-08-28 NOTE — ED NOTES
Pt ambulated to R7, agree with triage note.  Pt placed on monitor.  Attempted IV access, unable to complete IV but blood drawn and sent to lab.  Xray at bedside.  ERP to see.

## 2023-08-28 NOTE — ED PROVIDER NOTES
CHIEF COMPLAINT  Chief Complaint   Patient presents with    Blood in Vomit     Reports emesis x 1800 yesterday x 1 episode no blood. Blood in emesis x 1 at 0530. Reports blood: dark brown and bright red, pt reports amount of blood as large. Pt DC home from [facility] yesterday, dx of abd pain. S/p sx procedure; removal of gallbladder. Unable to tolerate PO liquid/solids since DC from Hospital.       LIMITATION TO HISTORY   Select: none    HPI    Yenni Nieto is a 33 y.o. female who presents to the Emergency Department complaining of nausea vomiting continued abdominal pain and vomited once today that had blood in it.  Patient has had a history of a cholecystectomy done about a week ago.  Since then she was doing well for 2 to 3 days then apparently the pain started coming back so she was admitted to the hospital.  Multiple studies were done to include a HIDA scan MRCP CT scan which did not show any signs of obstructions or any other abnormalities according to the patient.  She was then discharged yesterday from the hospital.  She states that even today and while she was in the hospital when she would even eat a liquid diet she would have severe discomfort to her abdomen.  Last night she had just a minimal amount to eat some chicken that she made and the pain became very severe and this morning the pain is just continuous in nature and she vomited with blood in it today.  The patient did vomit once prior did not have blood.    OUTSIDE HISTORIAN(S):  Select: None    EXTERNAL RECORDS REVIEWED  Select: Other patient's recent admission 8/25/2023  MRI Results  IMPRESSION:     1. Mildly dilated CBD, measuring up to 10 mm. No choledocholithiasis.     2. Redemonstration of collection in the gallbladder fossa, similar to prior. Again this could be postop seroma, hematoma or biloma.           Exam Ended: 08/26/23 09:16             FINDINGS:  There is homogeneous uptake throughout the liver which dissipates throughout  the exam as expected.     There is activity seen in the biliary tree at the 15 minute image which is minimally dilated consistent with postcholecystectomy state.     There is activity in the small bowel identified by 20 minutes which increases throughout the exam.     No abnormal activity adjacent to the inferior margin of the liver or seen on the right lateral decubitus view.     IMPRESSION:     1.  No scintigraphic evidence of bile leak.           Exam Ended: 08/27/23 11:01           IMPRESSION:        1.  Intra-abdominal free air, within expected limits for recent postop changes. Differential could include viscus perforation in the appropriate clinical setting.  2.  Low-density fluid collection the gallbladder fossa, could represent postop seroma, hematoma, or biloma. Early forming abscess is not radiographically excluded.           Exam Ended: 08/25/23 20:23           PAST MEDICAL HISTORY  Past Medical History:   Diagnosis Date    Seizures (HCC)      .    SURGICAL HISTORY  Past Surgical History:   Procedure Laterality Date    FERNANDO BY LAPAROSCOPY  8/19/2023    Procedure: CHOLECYSTECTOMY, LAPAROSCOPIC;  Surgeon: Ky Ward M.D.;  Location: SURGERY [facility];  Service: General         FAMILY HISTORY  History reviewed. No pertinent family history.       SOCIAL HISTORY  Social History     Socioeconomic History    Marital status: Single     Spouse name: Not on file    Number of children: Not on file    Years of education: Not on file    Highest education level: Not on file   Occupational History    Not on file   Tobacco Use    Smoking status: Never    Smokeless tobacco: Never   Vaping Use    Vaping Use: Never used   Substance and Sexual Activity    Alcohol use: Never    Drug use: Never    Sexual activity: Not on file   Other Topics Concern    Not on file   Social History Narrative    Not on file     Social Determinants of Health     Financial Resource Strain: Not on file   Food Insecurity: Not on file  "  Transportation Needs: Not on file   Physical Activity: Not on file   Stress: Not on file   Social Connections: Not on file   Intimate Partner Violence: Not on file   Housing Stability: Not on file         CURRENT MEDICATIONS  No current facility-administered medications on file prior to encounter.     Current Outpatient Medications on File Prior to Encounter   Medication Sig Dispense Refill    oxyCODONE immediate release (ROXICODONE) 10 MG immediate release tablet Take 1 Tablet by mouth every 6 hours as needed for Severe Pain for up to 5 days. 15 Tablet 0    ferrous sulfate 325 (65 Fe) MG tablet Take 1 Tablet by mouth every morning with breakfast for 30 days. 30 Tablet 0    lamoTRIgine (LAMICTAL) 100 MG Tab Take 100 mg by mouth 2 times a day.      ibuprofen (ADVIL) 200 MG Tab Take 400 mg by mouth every 6 hours as needed for Mild Pain.             ALLERGIES  Allergies   Allergen Reactions    Keppra [Levetiracetam] Unspecified     \"Increased seizures\" per patient       PHYSICAL EXAM  VITAL SIGNS:BP 97/53   Pulse 67   Temp 36.3 °C (97.3 °F) (Temporal)   Resp 14   Ht 1.651 m (5' 5\")   Wt 78.2 kg (172 lb 6.4 oz)   LMP 08/23/2023 (Exact Date)   SpO2 98%   BMI 28.69 kg/m²     Constitutional: Ill in appearance but no acute distress  HENT: Normocephalic, Atraumatic, Bilateral external ears normal.  Eyes:  conjunctiva are normal.   Neck: Supple.  Nontender midline  Cardiovascular: Regular rate and rhythm without murmurs gallops or rubs.   Thorax & Lungs: No respiratory distress. Breathing comfortably. Lungs are clear to auscultation bilaterally, there are no wheezes no rales. Chest wall is nontender.  Abdomen: Soft, minimal tenderness epigastric region otherwise no peritoneal findings.  Skin: Warm, Dry, No erythema,   Back: No tenderness, No CVA tenderness.  Musculoskeletal: No clubbing cyanosis or edema good range of motion   Neurologic: Alert & oriented x 3, normal sensation moving all extremities appears normal "   Psychiatric: Affect normal, Judgment normal, Mood normal.       DIAGNOSTIC STUDIES / PROCEDURES    LABS  Results for orders placed or performed during the hospital encounter of 08/28/23   COD (ADULT)   Result Value Ref Range    ABO Grouping Only O     Rh Grouping Only POS     Antibody Screen-Cod NEG    CBC WITH DIFFERENTIAL   Result Value Ref Range    WBC 7.9 4.8 - 10.8 K/uL    RBC 4.78 4.20 - 5.40 M/uL    Hemoglobin 10.3 (L) 12.0 - 16.0 g/dL    Hematocrit 33.0 (L) 37.0 - 47.0 %    MCV 69.0 (L) 81.4 - 97.8 fL    MCH 21.5 (L) 27.0 - 33.0 pg    MCHC 31.2 (L) 32.2 - 35.5 g/dL    RDW 46.3 35.9 - 50.0 fL    Platelet Count 181 164 - 446 K/uL    MPV 10.1 9.0 - 12.9 fL    Neutrophils-Polys 70.70 44.00 - 72.00 %    Lymphocytes 19.30 (L) 22.00 - 41.00 %    Monocytes 7.40 0.00 - 13.40 %    Eosinophils 1.80 0.00 - 6.90 %    Basophils 0.40 0.00 - 1.80 %    Immature Granulocytes 0.40 0.00 - 0.90 %    Nucleated RBC 0.00 0.00 - 0.20 /100 WBC    Neutrophils (Absolute) 5.59 1.82 - 7.42 K/uL    Lymphs (Absolute) 1.52 1.00 - 4.80 K/uL    Monos (Absolute) 0.58 0.00 - 0.85 K/uL    Eos (Absolute) 0.14 0.00 - 0.51 K/uL    Baso (Absolute) 0.03 0.00 - 0.12 K/uL    Immature Granulocytes (abs) 0.03 0.00 - 0.11 K/uL    NRBC (Absolute) 0.00 K/uL   COMP METABOLIC PANEL   Result Value Ref Range    Sodium 138 135 - 145 mmol/L    Potassium 3.5 (L) 3.6 - 5.5 mmol/L    Chloride 102 96 - 112 mmol/L    Co2 25 20 - 33 mmol/L    Anion Gap 11.0 7.0 - 16.0    Glucose 96 65 - 99 mg/dL    Bun 9 8 - 22 mg/dL    Creatinine 0.51 0.50 - 1.40 mg/dL    Calcium 8.7 8.5 - 10.5 mg/dL    Correct Calcium 8.7 8.5 - 10.5 mg/dL    AST(SGOT) 104 (H) 12 - 45 U/L    ALT(SGPT) 150 (H) 2 - 50 U/L    Alkaline Phosphatase 1110 (H) 30 - 99 U/L    Total Bilirubin 0.4 0.1 - 1.5 mg/dL    Albumin 4.0 3.2 - 4.9 g/dL    Total Protein 6.5 6.0 - 8.2 g/dL    Globulin 2.5 1.9 - 3.5 g/dL    A-G Ratio 1.6 g/dL   LIPASE   Result Value Ref Range    Lipase 17 11 - 82 U/L   PROTHROMBIN TIME    Result Value Ref Range    PT 13.8 12.0 - 14.6 sec    INR 1.05 0.87 - 1.13   APTT   Result Value Ref Range    APTT 26.3 24.7 - 36.0 sec   ABO Rh Confirm   Result Value Ref Range    ABO Rh Confirm O POS    ESTIMATED GFR   Result Value Ref Range    GFR (CKD-EPI) 127 >60 mL/min/1.73 m 2         RADIOLOGY  I have independently interpreted the diagnostic imaging associated with this visit and am waiting the final reading from the radiologist.   My preliminary interpretation is as follows: Chest x-ray shows no significant consolidations      Radiologist interpretation:   US-RUQ   Final Result      1.  Interval cholecystectomy.   2.  3.9 by 2.8 x 2.7 cm fluid collection in the gallbladder fossa could be a seroma, hematoma or biloma. Infection is not excluded.      DX-CHEST-PORTABLE (1 VIEW)   Final Result      No acute cardiopulmonary abnormality.           COURSE & MEDICAL DECISION MAKING    ED COURSE:    ED Observation Status? Yes;I am placing the patient in to an observation status due to a diagnostic uncertainty as well as therapeutic intensity. Patient placed in observation status at 8:31 AM 8/28/2023    Observation plan is as follows: Patient presents for evaluation.  Clinically the patient does appear to be slightly ill I will start him IV fluids nausea medications.  I also get laboratory studies to see if there is a change to her liver enzymes or any other abnormalities.    INTERVENTIONS BY ME:  Medications   famotidine (Pepcid) injection 20 mg (has no administration in time range)   metoclopramide (Reglan) injection 10 mg (10 mg Intravenous Given 8/28/23 1405)   senna-docusate (Pericolace Or Senokot S) 8.6-50 MG per tablet 2 Tablet (has no administration in time range)     And   polyethylene glycol/lytes (Miralax) PACKET 1 Packet (has no administration in time range)     And   magnesium hydroxide (Milk Of Magnesia) suspension 30 mL (has no administration in time range)     And   bisacodyl (Dulcolax) suppository  10 mg (has no administration in time range)   lactated ringers infusion ( Intravenous New Bag 8/28/23 1416)   ondansetron (Zofran) syringe/vial injection 4 mg (has no administration in time range)   ondansetron (Zofran ODT) dispertab 4 mg (has no administration in time range)   promethazine (Phenergan) tablet 12.5-25 mg (has no administration in time range)   promethazine (Phenergan) suppository 12.5-25 mg (has no administration in time range)   prochlorperazine (Compazine) injection 5-10 mg (has no administration in time range)   potassium chloride (Kcl) ivpb 10 mEq (10 mEq Intravenous New Bag 8/28/23 1556)   Pharmacy Consult Request ...Pain Management Review 1 Each (has no administration in time range)   HYDROmorphone (Dilaudid) injection 0.5 mg (has no administration in time range)   lactated ringers (LR) bolus (0 mL Intravenous Stopped 8/28/23 0942)   metoclopramide (Reglan) injection 10 mg (10 mg Intravenous Given 8/28/23 0844)   GI Cocktail (hyoscyamine-lidocaine-Maalox) oral susp cup 30 mL (30 mL Oral Given 8/28/23 1051)     10:01 AM spoke with Dr. Jonathon AL who recommended repeating an ultrasound just to see if there is any changes to the biliary tree and recommended if the patient is still having problems drinking fluids and having increasing pain should be admitted for fluid hydration and liquid diet    Response on recheck: Patient is actually feeling well after fluids but she has not tried to eat anything.  She states it is only when she eats anything that she will have a lot of discomfort..      I have discussed management of the patient with the following physicians and sources:   Dr. Jonathon AL, hospitalist    Patient discharged from ED observation at 1:21 PM 08/28/23 and escalated to admission.      INITIAL ASSESSMENT, COURSE AND PLAN  Care Narrative: Patient presents emerged department for evaluation of her abdominal pain.  She states that even while she was in the hospital she was still having a lot of  discomfort even drinking fluids.  She went home and she stated she could not keep any fluids down and even food she was vomiting she vomited twice and 1 bout of hematemesis.  Operatory is do show an increasing of her LFTs and including the alkaline phosphatase.  I spoke with Dr. Holt and the concern is as there is another source of cause for her discomfort.  After long discussion we decided to keep the patient on a liquid diet and IV fluid hydration.  If continued with symptoms then an upper endoscopy would be reasonable.  Dr. Lopez will consult on this patient.  At this point I will admit the patient to the hospitalist for further treatment and care.  Dr. Holt did request to get an ultrasound done just to ensure that there is no changes.  At this point the patient is otherwise appropriate and will be admitted.      HYDRATION: Based on the patient's presentation of dehydration nausea vomiting,  the patient was given IV fluids. IV Hydration was used because oral hydration is unable to be done due to the patient's symptoms. Upon recheck following hydration, the patient was did improve.           ADDITIONAL PROBLEM LIST  1.  Recent cholecystectomy  DISPOSITION AND DISCUSSIONS  Patient will be admitted for further treatment and care.    FINAL DIAGNOSIS  1. Nausea and vomiting, unspecified vomiting type    2. Epigastric pain    3. Transaminitis             Electronically signed by: Acosta Garcia M.D.,4:36 PM 08/28/23

## 2023-08-28 NOTE — ED NOTES
U/S completed, awaiting results.  Report given to JAY JAY Ron.  Transport at bedside for transfer of pt to T204, all belongings with pt.

## 2023-08-29 ENCOUNTER — ANESTHESIA EVENT (OUTPATIENT)
Dept: SURGERY | Facility: MEDICAL CENTER | Age: 33
DRG: 446 | End: 2023-08-29

## 2023-08-29 ENCOUNTER — ANESTHESIA (OUTPATIENT)
Dept: SURGERY | Facility: MEDICAL CENTER | Age: 33
DRG: 446 | End: 2023-08-29

## 2023-08-29 PROBLEM — E87.6 HYPOKALEMIA: Status: ACTIVE | Noted: 2023-08-29

## 2023-08-29 LAB
ALBUMIN SERPL BCP-MCNC: 3.5 G/DL (ref 3.2–4.9)
ALP SERPL-CCNC: 803 U/L (ref 30–99)
ALT SERPL-CCNC: 103 U/L (ref 2–50)
ANION GAP SERPL CALC-SCNC: 10 MMOL/L (ref 7–16)
AST SERPL-CCNC: 39 U/L (ref 12–45)
BILIRUB CONJ SERPL-MCNC: <0.2 MG/DL (ref 0.1–0.5)
BILIRUB INDIRECT SERPL-MCNC: ABNORMAL MG/DL (ref 0–1)
BILIRUB SERPL-MCNC: 0.4 MG/DL (ref 0.1–1.5)
BUN SERPL-MCNC: 4 MG/DL (ref 8–22)
CALCIUM SERPL-MCNC: 8.5 MG/DL (ref 8.5–10.5)
CHLORIDE SERPL-SCNC: 106 MMOL/L (ref 96–112)
CO2 SERPL-SCNC: 24 MMOL/L (ref 20–33)
CREAT SERPL-MCNC: 0.47 MG/DL (ref 0.5–1.4)
ERYTHROCYTE [DISTWIDTH] IN BLOOD BY AUTOMATED COUNT: 47.7 FL (ref 35.9–50)
GFR SERPLBLD CREATININE-BSD FMLA CKD-EPI: 130 ML/MIN/1.73 M 2
GLUCOSE SERPL-MCNC: 84 MG/DL (ref 65–99)
HCG SERPL QL: NEGATIVE
HCG UR QL: NEGATIVE
HCT VFR BLD AUTO: 27.6 % (ref 37–47)
HGB BLD-MCNC: 8.3 G/DL (ref 12–16)
MCH RBC QN AUTO: 21.2 PG (ref 27–33)
MCHC RBC AUTO-ENTMCNC: 30.1 G/DL (ref 32.2–35.5)
MCV RBC AUTO: 70.4 FL (ref 81.4–97.8)
PLATELET # BLD AUTO: 174 K/UL (ref 164–446)
PMV BLD AUTO: 9.7 FL (ref 9–12.9)
POTASSIUM SERPL-SCNC: 3.8 MMOL/L (ref 3.6–5.5)
PROT SERPL-MCNC: 5.8 G/DL (ref 6–8.2)
RBC # BLD AUTO: 3.92 M/UL (ref 4.2–5.4)
SODIUM SERPL-SCNC: 140 MMOL/L (ref 135–145)
WBC # BLD AUTO: 6.6 K/UL (ref 4.8–10.8)

## 2023-08-29 PROCEDURE — 81025 URINE PREGNANCY TEST: CPT

## 2023-08-29 PROCEDURE — 700111 HCHG RX REV CODE 636 W/ 250 OVERRIDE (IP): Mod: JZ | Performed by: HOSPITALIST

## 2023-08-29 PROCEDURE — 700111 HCHG RX REV CODE 636 W/ 250 OVERRIDE (IP): Performed by: NURSE PRACTITIONER

## 2023-08-29 PROCEDURE — 700105 HCHG RX REV CODE 258: Performed by: HOSPITALIST

## 2023-08-29 PROCEDURE — 160002 HCHG RECOVERY MINUTES (STAT): Performed by: SPECIALIST

## 2023-08-29 PROCEDURE — 96376 TX/PRO/DX INJ SAME DRUG ADON: CPT

## 2023-08-29 PROCEDURE — 700102 HCHG RX REV CODE 250 W/ 637 OVERRIDE(OP): Performed by: HOSPITALIST

## 2023-08-29 PROCEDURE — 700111 HCHG RX REV CODE 636 W/ 250 OVERRIDE (IP): Performed by: ANESTHESIOLOGY

## 2023-08-29 PROCEDURE — 770006 HCHG ROOM/CARE - MED/SURG/GYN SEMI*

## 2023-08-29 PROCEDURE — A9270 NON-COVERED ITEM OR SERVICE: HCPCS | Performed by: HOSPITALIST

## 2023-08-29 PROCEDURE — 160202 HCHG ENDO MINUTES - 1ST 30 MINS LEVEL 3: Performed by: SPECIALIST

## 2023-08-29 PROCEDURE — 80076 HEPATIC FUNCTION PANEL: CPT

## 2023-08-29 PROCEDURE — 84703 CHORIONIC GONADOTROPIN ASSAY: CPT

## 2023-08-29 PROCEDURE — 99233 SBSQ HOSP IP/OBS HIGH 50: CPT | Performed by: INTERNAL MEDICINE

## 2023-08-29 PROCEDURE — 700111 HCHG RX REV CODE 636 W/ 250 OVERRIDE (IP): Mod: JZ | Performed by: INTERNAL MEDICINE

## 2023-08-29 PROCEDURE — 160009 HCHG ANES TIME/MIN: Performed by: SPECIALIST

## 2023-08-29 PROCEDURE — 43235 EGD DIAGNOSTIC BRUSH WASH: CPT | Performed by: SPECIALIST

## 2023-08-29 PROCEDURE — 160048 HCHG OR STATISTICAL LEVEL 1-5: Performed by: SPECIALIST

## 2023-08-29 PROCEDURE — 96375 TX/PRO/DX INJ NEW DRUG ADDON: CPT

## 2023-08-29 PROCEDURE — 36415 COLL VENOUS BLD VENIPUNCTURE: CPT

## 2023-08-29 PROCEDURE — 160035 HCHG PACU - 1ST 60 MINS PHASE I: Performed by: SPECIALIST

## 2023-08-29 PROCEDURE — 85027 COMPLETE CBC AUTOMATED: CPT

## 2023-08-29 PROCEDURE — 80048 BASIC METABOLIC PNL TOTAL CA: CPT

## 2023-08-29 PROCEDURE — 0DJ08ZZ INSPECTION OF UPPER INTESTINAL TRACT, VIA NATURAL OR ARTIFICIAL OPENING ENDOSCOPIC: ICD-10-PCS | Performed by: SPECIALIST

## 2023-08-29 RX ORDER — HYDROMORPHONE HYDROCHLORIDE 1 MG/ML
0.1 INJECTION, SOLUTION INTRAMUSCULAR; INTRAVENOUS; SUBCUTANEOUS
Status: DISCONTINUED | OUTPATIENT
Start: 2023-08-29 | End: 2023-08-29 | Stop reason: HOSPADM

## 2023-08-29 RX ORDER — EPHEDRINE SULFATE 50 MG/ML
5 INJECTION, SOLUTION INTRAVENOUS
Status: DISCONTINUED | OUTPATIENT
Start: 2023-08-29 | End: 2023-08-29 | Stop reason: HOSPADM

## 2023-08-29 RX ORDER — MIDAZOLAM HYDROCHLORIDE 1 MG/ML
1 INJECTION INTRAMUSCULAR; INTRAVENOUS
Status: DISCONTINUED | OUTPATIENT
Start: 2023-08-29 | End: 2023-08-29 | Stop reason: HOSPADM

## 2023-08-29 RX ORDER — SODIUM CHLORIDE, SODIUM LACTATE, POTASSIUM CHLORIDE, CALCIUM CHLORIDE 600; 310; 30; 20 MG/100ML; MG/100ML; MG/100ML; MG/100ML
INJECTION, SOLUTION INTRAVENOUS CONTINUOUS
Status: DISCONTINUED | OUTPATIENT
Start: 2023-08-29 | End: 2023-08-29 | Stop reason: HOSPADM

## 2023-08-29 RX ORDER — ONDANSETRON 2 MG/ML
4 INJECTION INTRAMUSCULAR; INTRAVENOUS
Status: DISCONTINUED | OUTPATIENT
Start: 2023-08-29 | End: 2023-08-29 | Stop reason: HOSPADM

## 2023-08-29 RX ORDER — KETOROLAC TROMETHAMINE 30 MG/ML
30 INJECTION, SOLUTION INTRAMUSCULAR; INTRAVENOUS EVERY 6 HOURS PRN
Status: DISCONTINUED | OUTPATIENT
Start: 2023-08-29 | End: 2023-09-02 | Stop reason: HOSPADM

## 2023-08-29 RX ORDER — LORAZEPAM 2 MG/ML
1 INJECTION INTRAMUSCULAR ONCE
Status: COMPLETED | OUTPATIENT
Start: 2023-08-29 | End: 2023-08-29

## 2023-08-29 RX ORDER — MIDAZOLAM HYDROCHLORIDE 1 MG/ML
INJECTION INTRAMUSCULAR; INTRAVENOUS PRN
Status: DISCONTINUED | OUTPATIENT
Start: 2023-08-29 | End: 2023-08-29 | Stop reason: SURG

## 2023-08-29 RX ORDER — HYDRALAZINE HYDROCHLORIDE 20 MG/ML
5 INJECTION INTRAMUSCULAR; INTRAVENOUS
Status: DISCONTINUED | OUTPATIENT
Start: 2023-08-29 | End: 2023-08-29 | Stop reason: HOSPADM

## 2023-08-29 RX ORDER — DIPHENHYDRAMINE HYDROCHLORIDE 50 MG/ML
12.5 INJECTION INTRAMUSCULAR; INTRAVENOUS
Status: DISCONTINUED | OUTPATIENT
Start: 2023-08-29 | End: 2023-08-29 | Stop reason: HOSPADM

## 2023-08-29 RX ORDER — MEPERIDINE HYDROCHLORIDE 25 MG/ML
12.5 INJECTION INTRAMUSCULAR; INTRAVENOUS; SUBCUTANEOUS
Status: DISCONTINUED | OUTPATIENT
Start: 2023-08-29 | End: 2023-08-29 | Stop reason: HOSPADM

## 2023-08-29 RX ORDER — HYDROMORPHONE HYDROCHLORIDE 1 MG/ML
0.2 INJECTION, SOLUTION INTRAMUSCULAR; INTRAVENOUS; SUBCUTANEOUS
Status: DISCONTINUED | OUTPATIENT
Start: 2023-08-29 | End: 2023-08-29 | Stop reason: HOSPADM

## 2023-08-29 RX ORDER — HYDROMORPHONE HYDROCHLORIDE 1 MG/ML
0.4 INJECTION, SOLUTION INTRAMUSCULAR; INTRAVENOUS; SUBCUTANEOUS
Status: DISCONTINUED | OUTPATIENT
Start: 2023-08-29 | End: 2023-08-29 | Stop reason: HOSPADM

## 2023-08-29 RX ADMIN — KETOROLAC TROMETHAMINE 30 MG: 30 INJECTION, SOLUTION INTRAMUSCULAR; INTRAVENOUS at 16:34

## 2023-08-29 RX ADMIN — PROPOFOL 100 MCG/KG/MIN: 10 INJECTION, EMULSION INTRAVENOUS at 11:18

## 2023-08-29 RX ADMIN — LAMOTRIGINE 100 MG: 100 TABLET ORAL at 18:57

## 2023-08-29 RX ADMIN — LAMOTRIGINE 100 MG: 100 TABLET ORAL at 06:23

## 2023-08-29 RX ADMIN — KETOROLAC TROMETHAMINE 30 MG: 30 INJECTION, SOLUTION INTRAMUSCULAR; INTRAVENOUS at 22:49

## 2023-08-29 RX ADMIN — SENNOSIDES AND DOCUSATE SODIUM 2 TABLET: 50; 8.6 TABLET ORAL at 06:23

## 2023-08-29 RX ADMIN — SODIUM CHLORIDE, POTASSIUM CHLORIDE, SODIUM LACTATE AND CALCIUM CHLORIDE: 600; 310; 30; 20 INJECTION, SOLUTION INTRAVENOUS at 11:16

## 2023-08-29 RX ADMIN — METOCLOPRAMIDE 10 MG: 5 INJECTION, SOLUTION INTRAMUSCULAR; INTRAVENOUS at 06:22

## 2023-08-29 RX ADMIN — FAMOTIDINE 20 MG: 10 INJECTION INTRAVENOUS at 18:57

## 2023-08-29 RX ADMIN — MIDAZOLAM 2 MG: 1 INJECTION, SOLUTION INTRAMUSCULAR; INTRAVENOUS at 11:18

## 2023-08-29 RX ADMIN — FENTANYL CITRATE 100 MCG: 50 INJECTION, SOLUTION INTRAMUSCULAR; INTRAVENOUS at 11:18

## 2023-08-29 RX ADMIN — LORAZEPAM 1 MG: 2 INJECTION INTRAMUSCULAR; INTRAVENOUS at 22:09

## 2023-08-29 RX ADMIN — SODIUM CHLORIDE, POTASSIUM CHLORIDE, SODIUM LACTATE AND CALCIUM CHLORIDE: 600; 310; 30; 20 INJECTION, SOLUTION INTRAVENOUS at 06:21

## 2023-08-29 RX ADMIN — FAMOTIDINE 20 MG: 10 INJECTION INTRAVENOUS at 06:23

## 2023-08-29 ASSESSMENT — PAIN DESCRIPTION - PAIN TYPE
TYPE: ACUTE PAIN
TYPE: SURGICAL PAIN
TYPE: SURGICAL PAIN
TYPE: SURGICAL PAIN;ACUTE PAIN
TYPE: SURGICAL PAIN

## 2023-08-29 ASSESSMENT — ENCOUNTER SYMPTOMS
NAUSEA: 1
ABDOMINAL PAIN: 1
VOMITING: 1

## 2023-08-29 ASSESSMENT — FIBROSIS 4 INDEX: FIB4 SCORE: 0.71

## 2023-08-29 NOTE — CARE PLAN
The patient is Stable - Low risk of patient condition declining or worsening    Shift Goals  Clinical Goals: RUQ US results, EGD, pain control, tolerate diet  Patient Goals: Find out problem    Progress made toward(s) clinical / shift goals:  PT ambulating, intermittent reports of pain.    Patient is not progressing towards the following goals:      Problem: Pain - Standard  Goal: Alleviation of pain or a reduction in pain to the patient’s comfort goal  Outcome: Progressing     Problem: Knowledge Deficit - Standard  Goal: Patient and family/care givers will demonstrate understanding of plan of care, disease process/condition, diagnostic tests and medications  Outcome: Progressing     Problem: Risk for Post Op Fluid Imbalance  Goal: Promotion of fluid balance  Outcome: Progressing     Problem: Early Mobilization - Post Surgery  Goal: Early mobilization post surgery  Outcome: Progressing     Problem: Pain - Post Surgery  Goal: Alleviation or reduction of pain post surgery  Outcome: Progressing  Goal: Proper management of On-Q Pump  Outcome: Progressing     Problem: Wound/ / Incision Healing  Goal: Patient's wound/surgical incision will decrease in size and heals properly  Outcome: Progressing

## 2023-08-29 NOTE — CARE PLAN
Problem: Pain - Standard  Goal: Alleviation of pain or a reduction in pain to the patient’s comfort goal  Outcome: Progressing     Problem: Knowledge Deficit - Standard  Goal: Patient and family/care givers will demonstrate understanding of plan of care, disease process/condition, diagnostic tests and medications  Outcome: Progressing   The patient is Stable - Low risk of patient condition declining or worsening    Shift Goals  Clinical Goals: RUQ US results, EGD, pain control, tolerate diet  Patient Goals: Find out problem    Progress made toward(s) clinical / shift goals:  Patient is able to voice understanding of plan of care.    Patient is not progressing towards the following goals: N/A

## 2023-08-29 NOTE — PROGRESS NOTES
Report received from JAY JAY Lynn.  Assumed care of patient.  Assessment complete.  Plan of care gone over with the patient and all concerns addressed.  Patient brought up to the CDU by transport.  Patient A & O  x 4.  No apparent signs of distress.  Safety precautions in place.  Patient educated to call for assistance.  Hourly rounding in place.

## 2023-08-29 NOTE — ANESTHESIA TIME REPORT
Anesthesia Start and Stop Event Times     Date Time Event    8/29/2023 1113 Ready for Procedure     1116 Anesthesia Start     1137 Anesthesia Stop        Responsible Staff  08/29/23    Name Role Begin End    Aneudy Goldberg M.D. Anesth 1116 1137        Overtime Reason:  no overtime (within assigned shift)    Comments:

## 2023-08-29 NOTE — OR NURSING
1127 - received patient from OR. Report from Anesthesiologist and OR RN. Patient on 4L of O2 via masl. VSS. Monitor connected. Patient sleeping.     1145 Patient awake, denies any needs for pain or nausea.     1210 Patient to remain NPO for gastric emptying study per Dr. Escobar. Updated Ariadne GOYAL on CDU. Dr. Escobar at bedside to discuss with patient.     1226 Patient with transport back to room.

## 2023-08-29 NOTE — PROGRESS NOTES
4 Eyes Skin Assessment Completed by JAY JAY Ron and JAY JAY Hawthorne.    Head WDL  Ears WDL  Nose WDL  Mouth WDL  Neck WDL  Breast/Chest WDL  Shoulder Blades WDL  Spine WDL  (R) Arm/Elbow/Hand WDL  (L) Arm/Elbow/Hand Edema  Abdomen Incision, healing 3 lap stabs   Groin WDL  Scrotum/Coccyx/Buttocks WDL  (R) Leg WDL  (L) Leg WDL  (R) Heel/Foot/Toe WDL  (L) Heel/Foot/Toe WDL          Devices In Places Pulse Ox      Interventions In Place Pillows    Possible Skin Injury No    Pictures Uploaded Into Epic N/A  Wound Consult Placed N/A  RN Wound Prevention Protocol Ordered Yes

## 2023-08-29 NOTE — PROGRESS NOTES
Hospital Medicine Daily Progress Note    Date of Service  8/29/2023    Chief Complaint  Vomiting    Hospital Course  Yenni Nieto is a 33 y.o. female with history of seizures, and recent cholecystectomy 24 hours prior to admission and was just discharged, who presented 8/28/2023 with intractable pain after eating.  She states that about 1 hour after eating she has very sharp pain in the epigastric area . She was nauseous and vomiting and cannot keep anything down. On the previous admission, MRCP was done and noted mildly dilated CBD at 10mm, with no choledocholithiasis with redemonstration of collection in the gallbladder fossa similar to prior which was felt to be postop seroma, hematoma or biloma. HIDA scan showed no evidence of leak.  At that time, she was felt to have passed a biliary duct stone.  On evaluation, right upper quadrant ultrasound showed interval cholecystectomy with again noted 3.9 x 2.8 x 2.7 cm fluid collection in the gallbladder fossa.  LFTs elevated, with ALT of 150, AST of 104, alkaline phosphatase of 1110, with normal bilirubin and lipase.  WBC count was normal.  Potassium 3.5.  Creatinine is normal. GI was consulted.  She is started on supportive care with IV Reglan, Pepcid, and IVF.  She had low potassium which was replaced.  GI recommended EGD.      Interval Problem Update  8/29/2023 - I reviewed the patient's chart. There were no significant overnight events. Remains hemodynamically stable and afebrile. Stable on RA.  No leukocytosis.  Hemoglobin 8.3.  Platelet count is normal.  Electrolytes and renal function are normal.  LFTs have trended down, AST now 39, , alkaline phosphatase 823, with normal bilirubin.  I discussed with GI, she had an EGD done which only showed hiatal hernia.    > I have personally seen and examined the patient today.  She is currently comfortable as she is n.p.o.  She states she gets abdominal pain only when she eats.  Denies any nausea or vomiting  currently.  Not short of breath.  No other complaints.    I personally reviewed all lab results mentioned above. Prior medical records from this institution and outside facilities were independently reviewed as noted. I also personally reviewed all ER physician and consultant recommendations and plans as documented above. History was independently obtained by myself. I have discussed this patient's plan of care and discharge plan at IDT rounds today with Case Management, Nursing, Nursing leadership, and other members of the IDT team.    Consultants/Specialty  GI    Code Status  Full Code    Disposition  The patient is not medically cleared for discharge to home or a post-acute facility.      Anticipate discharge to home once medically cleared.  I have placed the appropriate orders for post-discharge needs.    Review of Systems  ROS     Pertinent positives/negatives as mentioned above.     A complete review of systems was personally done by me. All other systems were negative.       Physical Exam  Temp:  [36.2 °C (97.2 °F)-36.9 °C (98.4 °F)] 36.3 °C (97.4 °F)  Pulse:  [49-72] 56  Resp:  [13-22] 18  BP: ()/(53-75) 132/75  SpO2:  [95 %-100 %] 97 %    Physical Exam  Vitals reviewed.   Constitutional:       General: She is not in acute distress.     Appearance: Normal appearance. She is normal weight. She is not ill-appearing or diaphoretic.   HENT:      Head: Normocephalic and atraumatic.      Right Ear: External ear normal.      Left Ear: External ear normal.      Mouth/Throat:      Mouth: Mucous membranes are moist.      Pharynx: No oropharyngeal exudate or posterior oropharyngeal erythema.   Eyes:      General: No scleral icterus.     Extraocular Movements: Extraocular movements intact.      Conjunctiva/sclera: Conjunctivae normal.      Pupils: Pupils are equal, round, and reactive to light.   Cardiovascular:      Rate and Rhythm: Normal rate and regular rhythm.      Heart sounds: Normal heart sounds. No murmur  heard.  Pulmonary:      Effort: Pulmonary effort is normal. No respiratory distress.      Breath sounds: Normal breath sounds. No stridor. No wheezing, rhonchi or rales.   Chest:      Chest wall: No tenderness.   Abdominal:      General: Bowel sounds are normal. There is no distension.      Palpations: Abdomen is soft. There is no mass.      Tenderness: There is abdominal tenderness (Epigastric). There is no guarding or rebound.   Musculoskeletal:         General: No swelling. Normal range of motion.      Cervical back: Normal range of motion and neck supple. No rigidity. No muscular tenderness.      Right lower leg: No edema.      Left lower leg: No edema.   Lymphadenopathy:      Cervical: No cervical adenopathy.   Skin:     General: Skin is warm and dry.      Coloration: Skin is not jaundiced.      Findings: No rash.   Neurological:      General: No focal deficit present.      Mental Status: She is alert and oriented to person, place, and time. Mental status is at baseline.      Cranial Nerves: No cranial nerve deficit.   Psychiatric:         Mood and Affect: Mood normal.         Behavior: Behavior normal.         Thought Content: Thought content normal.         Judgment: Judgment normal.         Fluids    Intake/Output Summary (Last 24 hours) at 8/29/2023 1338  Last data filed at 8/29/2023 1125  Gross per 24 hour   Intake 1165 ml   Output --   Net 1165 ml       Laboratory  Recent Labs     08/27/23  0326 08/28/23  0755 08/29/23  0024   WBC 5.9 7.9 6.6   RBC 4.09* 4.78 3.92*   HEMOGLOBIN 8.6* 10.3* 8.3*   HEMATOCRIT 28.5* 33.0* 27.6*   MCV 69.7* 69.0* 70.4*   MCH 21.0* 21.5* 21.2*   MCHC 30.2* 31.2* 30.1*   RDW 46.9 46.3 47.7   PLATELETCT 154* 181 174   MPV 9.2 10.1 9.7     Recent Labs     08/27/23  0107 08/28/23  0755 08/29/23  0024   SODIUM 139 138 140   POTASSIUM 3.5* 3.5* 3.8   CHLORIDE 105 102 106   CO2 22 25 24   GLUCOSE 79 96 84   BUN 5* 9 4*   CREATININE 0.44* 0.51 0.47*   CALCIUM 9.0 8.7 8.5     Recent  Labs     08/28/23  0755   APTT 26.3   INR 1.05               Imaging  US-RUQ   Final Result      1.  Interval cholecystectomy.   2.  3.9 by 2.8 x 2.7 cm fluid collection in the gallbladder fossa could be a seroma, hematoma or biloma. Infection is not excluded.      DX-CHEST-PORTABLE (1 VIEW)   Final Result      No acute cardiopulmonary abnormality.      NM-GASTRIC EMPTYING    (Results Pending)        Assessment/Plan  * Intractable nausea and vomiting- (present on admission)  Assessment & Plan  - Unclear etiology.  Had gallbladder removed recently, but symptoms persisted.  EGD only shows hiatal hernia.  -Will get a gastric emptying study.  -Continue pain control, start IV Toradol as will not be able to get IV narcotics for gastric emptying study.  -Hold off on scheduled Reglan.  Continue as needed Zofran for now.  Continue IV Pepcid.  -Continue to hydrate with IV LR at 125 cc/h.    Hypokalemia- (present on admission)  Assessment & Plan  - Replaced.  Potassium has normalized.  Continue to trend.  BMP in the morning.    Elevated liver enzymes- (present on admission)  Assessment & Plan  - LFTs trending down.  May be related to recent cholecystectomy, suspected that she may have passed biliary stone.  -Continue to trend.  -Continue to hydrate with IV LR.    S/P laparoscopic cholecystectomy- (present on admission)  Assessment & Plan  -Most recent imaging showing fluid collection in the gallbladder fossa, likely postoperative seroma/hematoma.  -Reconsult surgery if warranted and if gastric emptying study is negative and patient continues to be symptomatic.    Seizures (HCC)- (present on admission)  Assessment & Plan  -Continue home Lamictal.      Iron deficiency anemia- (present on admission)  Assessment & Plan  -Replace po once able to tolerate         VTE prophylaxis:   SCDs/TEDs

## 2023-08-29 NOTE — PROCEDURES
OPERATIVE REPORT    PATIENT:   Yenni Nieto   1990       PREOPERATIVE DIAGNOSES/INDICATIONS: abdominal pain, nausea and vomiting    POSTOPERATIVE DIAGNOSIS: hiatal hernia, mild inflammation in antrum    PROCEDURE:  ESOPHAGOGASTRODUODENOSCOPY    PHYSICIAN:  Betty Escobar MD    ANESTHESIA:  Per anesthesiologist.    LOCATION: [facility]    CONSENT:  OBTAINED. The risks, benefits and alternatives of the procedure were discussed in details. The risks include and are not limited to bleeding, infection, perforation, missed lesions, and sedations risks (cardiopulmonary compromise and allergic reaction to medications).    DESCRIPTION: The patient presented to the procedure room.  After routine checkup was performed, patient was brought into the endoscopy suite.  Patient was placed on his left lateral decubitus position. A bite block was placed in patient's mouth. Patient was sedated by anesthesia.  Vital signs were monitored throughout the procedure.  Oxygenation support was provided throughout the procedure. Upper endoscope was inserted into patient's mouth and advanced to the second portion of the duodenum under direct visualization.      Once the site was reached and examined, the upper endoscope was withdrawn.  Retroflexion was made within the stomach.  The stomach was decompressed, scope was withdrawn and the procedure was terminated.    The patient tolerated the procedure well.  There were no immediate complications.    OPERATIVE FINDINGS:    1. Esophagus: normal  2. Stomach: hiatal hernia, mild erythema in antrum  3. Duodenum: normal duodenum to third portion    IMPRESSION/RECOMMENDATIONS:  Can try PPI  Isoenzymes of alk phos  Ask about menstrual cycle with iron def anemia. If not significant bleeding we need to further evaluate  Gastric emptying study - pain and vomiting occur four hours after eating    This note has been transcribed with digital voice recognition software and although it has been reviewed  may contain grammatical or word errors

## 2023-08-29 NOTE — CARE PLAN
Problem: Pain - Standard  Goal: Alleviation of pain or a reduction in pain to the patient’s comfort goal  Outcome: Progressing     Problem: Knowledge Deficit - Standard  Goal: Patient and family/care givers will demonstrate understanding of plan of care, disease process/condition, diagnostic tests and medications  Outcome: Progressing     Problem: Risk for Post Op Fluid Imbalance  Goal: Promotion of fluid balance  Outcome: Progressing     Problem: Early Mobilization - Post Surgery  Goal: Early mobilization post surgery  Outcome: Progressing     Problem: Pain - Post Surgery  Goal: Alleviation or reduction of pain post surgery  Outcome: Progressing   The patient is Stable - Low risk of patient condition declining or worsening    Shift Goals  Clinical Goals: RUQ US results, EGD, pain control, tolerate diet  Patient Goals: Find out problem    Progress made toward(s) clinical / shift goals:  Pt is able to ambulate to the restroom and back with a steady gate.    Patient is not progressing towards the following goals: N/A

## 2023-08-29 NOTE — ANESTHESIA POSTPROCEDURE EVALUATION
Patient: Yenni Nieto    Procedure Summary     Date: 08/29/23 Room / Location: [facility] ROOM 26 / SURGERY SAME DAY [facility]    Anesthesia Start: 1116 Anesthesia Stop: 1137    Procedure: GASTROSCOPY (Esophagus) Diagnosis: (Hiatal Hernia)    Surgeons: Betty Escobar M.D. Responsible Provider: Aneudy Goldberg M.D.    Anesthesia Type: MAC ASA Status: 2          Final Anesthesia Type: MAC  Last vitals  BP   Blood Pressure: 94/53    Temp   36.2 °C (97.2 °F)    Pulse   (!) 49   Resp   18    SpO2   100 %      Anesthesia Post Evaluation    Patient location during evaluation: PACU  Patient participation: complete - patient participated  Level of consciousness: sleepy but conscious    Airway patency: patent  Anesthetic complications: no  Cardiovascular status: hemodynamically stable  Respiratory status: acceptable  Hydration status: euvolemic    PONV: none          No notable events documented.     Nurse Pain Score: 0 (NPRS)

## 2023-08-29 NOTE — PROGRESS NOTES
Report received from JAY JAY Nunez.  Assumed care of patient.  Assessment complete.  Plan of care gone over with the patient and all concerns addressed.  Patient resting in bed waiting for her EGD. Urine sample need before procedure. Patient A & O x 4.  No apparent signs of distress.  Safety precautions in place.  Patient educated to call for assistance.  Hourly rounding in place.

## 2023-08-29 NOTE — ANESTHESIA PREPROCEDURE EVALUATION
" Case: [id number] Date/Time: 08/29/23 1205    Procedure: GASTROSCOPY (Esophagus)    Anesthesia type: MAC    Pre-op diagnosis: Hematemesis    Location: [facility] ROOM 26 / SURGERY SAME DAY [facility]    Surgeons: Betty Escobar M.D.        Anes H&P:  PAST MEDICAL HISTORY:   33 y.o. female who presents for Procedure(s) (LRB):  GASTROSCOPY (N/A).  She has current and past medical problems significant for:    Patient denies history of previous MI, chest pain or shortness of breath  Able to climb 2 flights of stair without dyspnea or angina, > 4 METs     Patient denies history of complications with anesthesia    Past Medical History:   Diagnosis Date   • Seizures (HCC)        SMOKING/ALCOHOL/RECREATIONAL DRUG USE:  Social History     Tobacco Use   • Smoking status: Never   • Smokeless tobacco: Never   Vaping Use   • Vaping Use: Never used   Substance Use Topics   • Alcohol use: Never   • Drug use: Never     Social History     Substance and Sexual Activity   Drug Use Never       PAST SURGICAL HISTORY:  Past Surgical History:   Procedure Laterality Date   • FERNANDO BY LAPAROSCOPY  8/19/2023    Procedure: CHOLECYSTECTOMY, LAPAROSCOPIC;  Surgeon: Ky Ward M.D.;  Location: SURGERY [facility];  Service: General       ALLERGIES:   Allergies   Allergen Reactions   • Keppra [Levetiracetam] Unspecified     \"Increased seizures\" per patient       MEDICATIONS:  No current facility-administered medications on file prior to encounter.     Current Outpatient Medications on File Prior to Encounter   Medication Sig Dispense Refill   • oxyCODONE immediate release (ROXICODONE) 10 MG immediate release tablet Take 1 Tablet by mouth every 6 hours as needed for Severe Pain for up to 5 days. 15 Tablet 0   • ferrous sulfate 325 (65 Fe) MG tablet Take 1 Tablet by mouth every morning with breakfast for 30 days. 30 Tablet 0   • lamoTRIgine (LAMICTAL) 100 MG Tab Take 100 mg by mouth 2 times a day.     • ibuprofen (ADVIL) 200 MG Tab Take 400 mg by " mouth every 6 hours as needed for Mild Pain.         LABS:  Recent Labs     08/27/23  0326 08/28/23  0755 08/29/23  0024   WBC 5.9 7.9 6.6   RBC 4.09* 4.78 3.92*   HEMOGLOBIN 8.6* 10.3* 8.3*   HEMATOCRIT 28.5* 33.0* 27.6*   MCV 69.7* 69.0* 70.4*   MCH 21.0* 21.5* 21.2*   RDW 46.9 46.3 47.7   PLATELETCT 154* 181 174   MPV 9.2 10.1 9.7   NEUTSPOLYS  --  70.70  --    LYMPHOCYTES  --  19.30*  --    MONOCYTES  --  7.40  --    EOSINOPHILS  --  1.80  --    BASOPHILS  --  0.40  --       Lab Results   Component Value Date/Time    SODIUM 140 08/29/2023 0024    POTASSIUM 3.8 08/29/2023 0024    CHLORIDE 106 08/29/2023 0024    CO2 24 08/29/2023 0024    GLUCOSE 84 08/29/2023 0024    BUN 4 (L) 08/29/2023 0024    CALCIUM 8.5 08/29/2023 0024         PREVIOUS ANESTHETICS: See EMR  __________________________________________    Relevant Problems   NEURO   (positive) Seizures (HCC)       Physical Exam    Airway   Mallampati: II  TM distance: >3 FB  Neck ROM: full       Cardiovascular - normal exam  Rhythm: regular  Rate: normal  (-) murmur     Dental - normal exam           Pulmonary - normal exam  Breath sounds clear to auscultation     Abdominal    Neurological - normal exam                 Anesthesia Plan    ASA 2       Plan - MAC               Induction: intravenous    Postoperative Plan: Postoperative administration of opioids is intended.    Pertinent diagnostic labs and testing reviewed    Informed Consent:    Anesthetic plan and risks discussed with patient.    Use of blood products discussed with: patient whom consented to blood products.

## 2023-08-30 ENCOUNTER — APPOINTMENT (OUTPATIENT)
Dept: RADIOLOGY | Facility: MEDICAL CENTER | Age: 33
DRG: 446 | End: 2023-08-30
Attending: SPECIALIST

## 2023-08-30 LAB
ALBUMIN SERPL BCP-MCNC: 3.9 G/DL (ref 3.2–4.9)
ALBUMIN/GLOB SERPL: 1.6 G/DL
ALP SERPL-CCNC: 1058 U/L (ref 30–99)
ALT SERPL-CCNC: 136 U/L (ref 2–50)
ANION GAP SERPL CALC-SCNC: 12 MMOL/L (ref 7–16)
AST SERPL-CCNC: 171 U/L (ref 12–45)
BILIRUB SERPL-MCNC: 1.2 MG/DL (ref 0.1–1.5)
BUN SERPL-MCNC: 6 MG/DL (ref 8–22)
CALCIUM ALBUM COR SERPL-MCNC: 8.5 MG/DL (ref 8.5–10.5)
CALCIUM SERPL-MCNC: 8.4 MG/DL (ref 8.5–10.5)
CHLORIDE SERPL-SCNC: 105 MMOL/L (ref 96–112)
CO2 SERPL-SCNC: 24 MMOL/L (ref 20–33)
CREAT SERPL-MCNC: 0.49 MG/DL (ref 0.5–1.4)
ERYTHROCYTE [DISTWIDTH] IN BLOOD BY AUTOMATED COUNT: 45.8 FL (ref 35.9–50)
GFR SERPLBLD CREATININE-BSD FMLA CKD-EPI: 128 ML/MIN/1.73 M 2
GLOBULIN SER CALC-MCNC: 2.4 G/DL (ref 1.9–3.5)
GLUCOSE SERPL-MCNC: 95 MG/DL (ref 65–99)
HCT VFR BLD AUTO: 30.1 % (ref 37–47)
HGB BLD-MCNC: 9.1 G/DL (ref 12–16)
HIV 1+2 AB+HIV1 P24 AG SERPL QL IA: NORMAL
MCH RBC QN AUTO: 20.9 PG (ref 27–33)
MCHC RBC AUTO-ENTMCNC: 30.2 G/DL (ref 32.2–35.5)
MCV RBC AUTO: 69 FL (ref 81.4–97.8)
PLATELET # BLD AUTO: 187 K/UL (ref 164–446)
PMV BLD AUTO: 9.8 FL (ref 9–12.9)
POTASSIUM SERPL-SCNC: 3.5 MMOL/L (ref 3.6–5.5)
PROT SERPL-MCNC: 6.3 G/DL (ref 6–8.2)
RBC # BLD AUTO: 4.36 M/UL (ref 4.2–5.4)
SODIUM SERPL-SCNC: 141 MMOL/L (ref 135–145)
WBC # BLD AUTO: 8.1 K/UL (ref 4.8–10.8)

## 2023-08-30 PROCEDURE — 770006 HCHG ROOM/CARE - MED/SURG/GYN SEMI*

## 2023-08-30 PROCEDURE — 700111 HCHG RX REV CODE 636 W/ 250 OVERRIDE (IP): Mod: JZ | Performed by: HOSPITALIST

## 2023-08-30 PROCEDURE — 80053 COMPREHEN METABOLIC PANEL: CPT

## 2023-08-30 PROCEDURE — A9270 NON-COVERED ITEM OR SERVICE: HCPCS | Mod: JZ | Performed by: STUDENT IN AN ORGANIZED HEALTH CARE EDUCATION/TRAINING PROGRAM

## 2023-08-30 PROCEDURE — 700111 HCHG RX REV CODE 636 W/ 250 OVERRIDE (IP): Mod: JZ | Performed by: INTERNAL MEDICINE

## 2023-08-30 PROCEDURE — 85027 COMPLETE CBC AUTOMATED: CPT

## 2023-08-30 PROCEDURE — 700111 HCHG RX REV CODE 636 W/ 250 OVERRIDE (IP): Performed by: STUDENT IN AN ORGANIZED HEALTH CARE EDUCATION/TRAINING PROGRAM

## 2023-08-30 PROCEDURE — 87389 HIV-1 AG W/HIV-1&-2 AB AG IA: CPT

## 2023-08-30 PROCEDURE — 86038 ANTINUCLEAR ANTIBODIES: CPT

## 2023-08-30 PROCEDURE — 700102 HCHG RX REV CODE 250 W/ 637 OVERRIDE(OP): Performed by: HOSPITALIST

## 2023-08-30 PROCEDURE — 99233 SBSQ HOSP IP/OBS HIGH 50: CPT | Performed by: STUDENT IN AN ORGANIZED HEALTH CARE EDUCATION/TRAINING PROGRAM

## 2023-08-30 PROCEDURE — 700105 HCHG RX REV CODE 258: Performed by: HOSPITALIST

## 2023-08-30 PROCEDURE — 84080 ASSAY ALKALINE PHOSPHATASES: CPT

## 2023-08-30 PROCEDURE — 86015 ACTIN ANTIBODY EACH: CPT

## 2023-08-30 PROCEDURE — 700102 HCHG RX REV CODE 250 W/ 637 OVERRIDE(OP): Mod: JZ | Performed by: STUDENT IN AN ORGANIZED HEALTH CARE EDUCATION/TRAINING PROGRAM

## 2023-08-30 PROCEDURE — A9541 TC99M SULFUR COLLOID: HCPCS

## 2023-08-30 PROCEDURE — 99232 SBSQ HOSP IP/OBS MODERATE 35: CPT | Performed by: NURSE PRACTITIONER

## 2023-08-30 PROCEDURE — 36415 COLL VENOUS BLD VENIPUNCTURE: CPT

## 2023-08-30 PROCEDURE — 93005 ELECTROCARDIOGRAM TRACING: CPT | Performed by: STUDENT IN AN ORGANIZED HEALTH CARE EDUCATION/TRAINING PROGRAM

## 2023-08-30 PROCEDURE — 84075 ASSAY ALKALINE PHOSPHATASE: CPT

## 2023-08-30 PROCEDURE — A9270 NON-COVERED ITEM OR SERVICE: HCPCS | Performed by: HOSPITALIST

## 2023-08-30 PROCEDURE — 86381 MITOCHONDRIAL ANTIBODY EACH: CPT

## 2023-08-30 PROCEDURE — 82652 VIT D 1 25-DIHYDROXY: CPT

## 2023-08-30 RX ORDER — OXYCODONE HYDROCHLORIDE 5 MG/1
5 TABLET ORAL
Status: DISCONTINUED | OUTPATIENT
Start: 2023-08-30 | End: 2023-09-02 | Stop reason: HOSPADM

## 2023-08-30 RX ORDER — POTASSIUM CHLORIDE 20 MEQ/1
40 TABLET, EXTENDED RELEASE ORAL ONCE
Status: COMPLETED | OUTPATIENT
Start: 2023-08-30 | End: 2023-08-30

## 2023-08-30 RX ORDER — OXYCODONE HYDROCHLORIDE 10 MG/1
10 TABLET ORAL
Status: DISCONTINUED | OUTPATIENT
Start: 2023-08-30 | End: 2023-09-02 | Stop reason: HOSPADM

## 2023-08-30 RX ORDER — HYDROMORPHONE HYDROCHLORIDE 1 MG/ML
0.5 INJECTION, SOLUTION INTRAMUSCULAR; INTRAVENOUS; SUBCUTANEOUS
Status: DISCONTINUED | OUTPATIENT
Start: 2023-08-30 | End: 2023-09-02

## 2023-08-30 RX ORDER — METOCLOPRAMIDE HYDROCHLORIDE 5 MG/ML
10 INJECTION INTRAMUSCULAR; INTRAVENOUS EVERY 6 HOURS
Status: DISCONTINUED | OUTPATIENT
Start: 2023-08-30 | End: 2023-09-01

## 2023-08-30 RX ORDER — SUCRALFATE ORAL 1 G/10ML
1 SUSPENSION ORAL EVERY 6 HOURS
Status: DISCONTINUED | OUTPATIENT
Start: 2023-08-30 | End: 2023-09-02 | Stop reason: HOSPADM

## 2023-08-30 RX ADMIN — SUCRALFATE 1 G: 1 SUSPENSION ORAL at 17:50

## 2023-08-30 RX ADMIN — METOCLOPRAMIDE 10 MG: 5 INJECTION, SOLUTION INTRAMUSCULAR; INTRAVENOUS at 14:43

## 2023-08-30 RX ADMIN — LAMOTRIGINE 100 MG: 100 TABLET ORAL at 17:50

## 2023-08-30 RX ADMIN — SODIUM CHLORIDE, POTASSIUM CHLORIDE, SODIUM LACTATE AND CALCIUM CHLORIDE: 600; 310; 30; 20 INJECTION, SOLUTION INTRAVENOUS at 10:00

## 2023-08-30 RX ADMIN — SENNOSIDES AND DOCUSATE SODIUM 2 TABLET: 50; 8.6 TABLET ORAL at 17:50

## 2023-08-30 RX ADMIN — KETOROLAC TROMETHAMINE 30 MG: 30 INJECTION, SOLUTION INTRAMUSCULAR; INTRAVENOUS at 11:49

## 2023-08-30 RX ADMIN — SUCRALFATE 1 G: 1 SUSPENSION ORAL at 23:31

## 2023-08-30 RX ADMIN — HYDROMORPHONE HYDROCHLORIDE 0.5 MG: 1 INJECTION, SOLUTION INTRAMUSCULAR; INTRAVENOUS; SUBCUTANEOUS at 16:09

## 2023-08-30 RX ADMIN — OXYCODONE HYDROCHLORIDE 10 MG: 10 TABLET ORAL at 14:43

## 2023-08-30 RX ADMIN — LAMOTRIGINE 100 MG: 100 TABLET ORAL at 05:41

## 2023-08-30 RX ADMIN — FAMOTIDINE 20 MG: 10 INJECTION INTRAVENOUS at 17:50

## 2023-08-30 RX ADMIN — POTASSIUM CHLORIDE 40 MEQ: 1500 TABLET, EXTENDED RELEASE ORAL at 14:42

## 2023-08-30 RX ADMIN — OXYCODONE HYDROCHLORIDE 10 MG: 10 TABLET ORAL at 17:49

## 2023-08-30 RX ADMIN — HYDROMORPHONE HYDROCHLORIDE 0.5 MG: 1 INJECTION, SOLUTION INTRAMUSCULAR; INTRAVENOUS; SUBCUTANEOUS at 21:22

## 2023-08-30 RX ADMIN — METOCLOPRAMIDE 10 MG: 5 INJECTION, SOLUTION INTRAMUSCULAR; INTRAVENOUS at 21:21

## 2023-08-30 RX ADMIN — SODIUM CHLORIDE, POTASSIUM CHLORIDE, SODIUM LACTATE AND CALCIUM CHLORIDE: 600; 310; 30; 20 INJECTION, SOLUTION INTRAVENOUS at 01:34

## 2023-08-30 RX ADMIN — FAMOTIDINE 20 MG: 10 INJECTION INTRAVENOUS at 05:41

## 2023-08-30 ASSESSMENT — ENCOUNTER SYMPTOMS
NAUSEA: 0
CHILLS: 0
SHORTNESS OF BREATH: 0
FEVER: 0
NAUSEA: 1
DIARRHEA: 1
ABDOMINAL PAIN: 1
BLOOD IN STOOL: 0
CONSTIPATION: 0
DIZZINESS: 0
VOMITING: 0
BLURRED VISION: 0
WEAKNESS: 0
BACK PAIN: 0
COUGH: 0
DEPRESSION: 0
HEARTBURN: 0
VOMITING: 1

## 2023-08-30 ASSESSMENT — PAIN DESCRIPTION - PAIN TYPE
TYPE: ACUTE PAIN

## 2023-08-30 NOTE — PROGRESS NOTES
..Gastroenterology Progress Note               Author:  MARCIO Simon Date & Time Created: 8/30/2023 7:41 AM       Patient ID:  Name:             Yenni Nieto    YOB: 1990  Age:                 33 y.o.  female  MRN:               [id number]        Medical Decision Making, by Problem:  Active Hospital Problems    Diagnosis     Hypokalemia [E87.6]     Intractable nausea and vomiting [R11.2]     Seizures (HCC) [R56.9]     S/P laparoscopic cholecystectomy [Z90.49]     Iron deficiency anemia [D50.9]     Elevated liver enzymes [R74.8]            Presenting Chief Complaint:  Abdominal pain, hematemesis     HPI:  This is a pleasant 33-year-old female s/p laparoscopic cholecystectomy 8/19/2023, recent admission 8/25/2023-8/27/2023 who presents back to [facility] on 8/28/2023 for severe right upper quadrant abdominal pain, nausea, vomiting, blood in vomit. No leukocytosis, hemoglobin increased from 8.6 yesterday to 10.3 today.  Platelets normal.  , , alk phos 1110, total bilirubin normal at 0.4, lipase 17, INR 1.05.  US-RUQ showed 3.9 x 2.8 x 2.7 cm fluid collection in the gallbladder fossa could be seroma, hematoma, biloma.  Infection was not included. In regards to her hematemesis and abdominal pain, discussed further evaluation with EGD.     Interval History:  8/30/2023: Patient seen after gastric emptying study.  Continues to have significant midepigastric pain which is related to eating 1 to 4 hours post meals.  Also reports diarrhea yesterday.  Alk phos increased to 1058>803, >39, >103      8/29/2023: EGD with Dr. Escobar:  OPERATIVE FINDINGS:  1. Esophagus: normal  2. Stomach: hiatal hernia, mild erythema in antrum  3. Duodenum: normal duodenum to third portion    Hospital Medications:  Current Facility-Administered Medications   Medication Dose Frequency Provider Last Rate Last Admin    ketorolac (Toradol) injection 30 mg  30 mg  Q6HRS PRN Kenny Bernard M.D.   30 mg at 08/29/23 2249    famotidine (Pepcid) injection 20 mg  20 mg BID Eder Smalls M.D.   20 mg at 08/30/23 0541    senna-docusate (Pericolace Or Senokot S) 8.6-50 MG per tablet 2 Tablet  2 Tablet BID Eder Smalls M.D.   2 Tablet at 08/29/23 0623    And    polyethylene glycol/lytes (Miralax) PACKET 1 Packet  1 Packet QDAY PRN Eder Smalls M.D.        And    magnesium hydroxide (Milk Of Magnesia) suspension 30 mL  30 mL QDAY PRN Eder Smalls M.D.        And    bisacodyl (Dulcolax) suppository 10 mg  10 mg QDAY PRN Eder Smalls M.D.        lactated ringers infusion   Continuous Eder Smalls M.D. 125 mL/hr at 08/30/23 0134 New Bag at 08/30/23 0134    ondansetron (Zofran) syringe/vial injection 4 mg  4 mg Q4HRS PRN Eder Smalls M.D.        ondansetron (Zofran ODT) dispertab 4 mg  4 mg Q4HRS PRN Eder Smalls M.D.        promethazine (Phenergan) tablet 12.5-25 mg  12.5-25 mg Q4HRS PRN Eder Smalls M.D.        promethazine (Phenergan) suppository 12.5-25 mg  12.5-25 mg Q4HRS PRHALI Smalls M.D.        prochlorperazine (Compazine) injection 5-10 mg  5-10 mg Q4HRS PRN Eder Smalls M.D.        Pharmacy Consult Request ...Pain Management Review 1 Each  1 Each PHARMACY TO DOSE Eder Smalls M.D.        [Held by provider] HYDROmorphone (Dilaudid) injection 0.5 mg  0.5 mg Q3HRS PRN Eder Smalls M.D.   0.5 mg at 08/28/23 2220    lamoTRIgine (LaMICtal) tablet 100 mg  100 mg BID Eder Smalls M.D.   100 mg at 08/30/23 0541   Last reviewed on 8/29/2023 10:11 AM by Jennifer Felix R.N.       Review of Systems:  Review of Systems   Constitutional:  Negative for chills, fever and malaise/fatigue.   HENT:  Negative for hearing loss.    Eyes:  Negative for blurred vision.   Respiratory:  Negative for cough and shortness of breath.    Cardiovascular:  Negative for chest pain and leg swelling.   Gastrointestinal:  Positive for abdominal pain and  "diarrhea. Negative for blood in stool, constipation, heartburn, melena, nausea and vomiting.   Genitourinary:  Negative for dysuria.   Musculoskeletal:  Negative for back pain.   Skin:  Negative for rash.   Neurological:  Negative for dizziness and weakness.   Psychiatric/Behavioral:  Negative for depression.    All other systems reviewed and are negative.        Vital signs:  Weight/BMI: Body mass index is 28.69 kg/m².  /74   Pulse 64   Temp 36.6 °C (97.9 °F) (Temporal)   Resp 16   Ht 1.651 m (5' 5\")   Wt 78.2 kg (172 lb 6.4 oz)   SpO2 98%   Vitals:    08/29/23 2249 08/30/23 0005 08/30/23 0351 08/30/23 0708   BP:   121/71 138/74   Pulse:   61 64   Resp: 18 17 18 16   Temp:   36.4 °C (97.6 °F) 36.6 °C (97.9 °F)   TempSrc:   Temporal Temporal   SpO2:   95% 98%   Weight:       Height:         Oxygen Therapy:  Pulse Oximetry: 98 %, O2 (LPM): 0, O2 Delivery Device: None - Room Air    Intake/Output Summary (Last 24 hours) at 8/30/2023 0741  Last data filed at 8/29/2023 1125  Gross per 24 hour   Intake 50 ml   Output --   Net 50 ml         Physical Exam:  Physical Exam  Vitals and nursing note reviewed.   Constitutional:       General: She is not in acute distress.     Appearance: Normal appearance.   HENT:      Head: Normocephalic and atraumatic.      Right Ear: External ear normal.      Left Ear: External ear normal.      Nose: Nose normal.      Mouth/Throat:      Mouth: Mucous membranes are moist.      Pharynx: Oropharynx is clear.   Eyes:      General: No scleral icterus.  Cardiovascular:      Rate and Rhythm: Normal rate and regular rhythm.      Pulses: Normal pulses.      Heart sounds: Normal heart sounds.   Pulmonary:      Effort: Pulmonary effort is normal. No respiratory distress.      Breath sounds: Normal breath sounds.   Abdominal:      General: Abdomen is flat. Bowel sounds are normal. There is no distension.      Palpations: Abdomen is soft.      Tenderness: There is abdominal tenderness. "   Musculoskeletal:         General: Normal range of motion.      Cervical back: Normal range of motion.   Skin:     General: Skin is warm and dry.      Capillary Refill: Capillary refill takes less than 2 seconds.   Neurological:      Mental Status: She is alert and oriented to person, place, and time.   Psychiatric:         Mood and Affect: Mood normal.         Behavior: Behavior normal.             Labs:  Recent Labs     08/28/23 0755 08/29/23 0024 08/30/23 0014   SODIUM 138 140 141   POTASSIUM 3.5* 3.8 3.5*   CHLORIDE 102 106 105   CO2 25 24 24   BUN 9 4* 6*   CREATININE 0.51 0.47* 0.49*   CALCIUM 8.7 8.5 8.4*     Recent Labs     08/28/23 0755 08/29/23 0024 08/30/23 0014   ALTSGPT 150* 103* 136*   ASTSGOT 104* 39 171*   ALKPHOSPHAT 1110* 803* 1058*   TBILIRUBIN 0.4 0.4 1.2   DBILIRUBIN  --  <0.2  --    LIPASE 17  --   --    GLUCOSE 96 84 95     Recent Labs     08/28/23 0755 08/29/23 0024 08/30/23 0014   WBC 7.9 6.6 8.1   NEUTSPOLYS 70.70  --   --    LYMPHOCYTES 19.30*  --   --    MONOCYTES 7.40  --   --    EOSINOPHILS 1.80  --   --    BASOPHILS 0.40  --   --    ASTSGOT 104* 39 171*   ALTSGPT 150* 103* 136*   ALKPHOSPHAT 1110* 803* 1058*   TBILIRUBIN 0.4 0.4 1.2     Recent Labs     08/28/23 0755 08/29/23 0024 08/30/23 0014   RBC 4.78 3.92* 4.36   HEMOGLOBIN 10.3* 8.3* 9.1*   HEMATOCRIT 33.0* 27.6* 30.1*   PLATELETCT 181 174 187   PROTHROMBTM 13.8  --   --    APTT 26.3  --   --    INR 1.05  --   --      Recent Results (from the past 24 hour(s))   HCG Qual Serum    Collection Time: 08/29/23  9:13 AM   Result Value Ref Range    Beta-Hcg Qualitative Serum Negative Negative   HCG Qualitative Ur    Collection Time: 08/29/23  9:30 AM   Result Value Ref Range    Beta-Hcg Urine Negative Negative   Comp Metabolic Panel    Collection Time: 08/30/23 12:14 AM   Result Value Ref Range    Sodium 141 135 - 145 mmol/L    Potassium 3.5 (L) 3.6 - 5.5 mmol/L    Chloride 105 96 - 112 mmol/L    Co2 24 20 - 33 mmol/L     Anion Gap 12.0 7.0 - 16.0    Glucose 95 65 - 99 mg/dL    Bun 6 (L) 8 - 22 mg/dL    Creatinine 0.49 (L) 0.50 - 1.40 mg/dL    Calcium 8.4 (L) 8.5 - 10.5 mg/dL    Correct Calcium 8.5 8.5 - 10.5 mg/dL    AST(SGOT) 171 (H) 12 - 45 U/L    ALT(SGPT) 136 (H) 2 - 50 U/L    Alkaline Phosphatase 1058 (H) 30 - 99 U/L    Total Bilirubin 1.2 0.1 - 1.5 mg/dL    Albumin 3.9 3.2 - 4.9 g/dL    Total Protein 6.3 6.0 - 8.2 g/dL    Globulin 2.4 1.9 - 3.5 g/dL    A-G Ratio 1.6 g/dL   CBC WITHOUT DIFFERENTIAL    Collection Time: 08/30/23 12:14 AM   Result Value Ref Range    WBC 8.1 4.8 - 10.8 K/uL    RBC 4.36 4.20 - 5.40 M/uL    Hemoglobin 9.1 (L) 12.0 - 16.0 g/dL    Hematocrit 30.1 (L) 37.0 - 47.0 %    MCV 69.0 (L) 81.4 - 97.8 fL    MCH 20.9 (L) 27.0 - 33.0 pg    MCHC 30.2 (L) 32.2 - 35.5 g/dL    RDW 45.8 35.9 - 50.0 fL    Platelet Count 187 164 - 446 K/uL    MPV 9.8 9.0 - 12.9 fL   ESTIMATED GFR    Collection Time: 08/30/23 12:14 AM   Result Value Ref Range    GFR (CKD-EPI) 128 >60 mL/min/1.73 m 2       Radiology Review:  US-RUQ   Final Result      1.  Interval cholecystectomy.   2.  3.9 by 2.8 x 2.7 cm fluid collection in the gallbladder fossa could be a seroma, hematoma or biloma. Infection is not excluded.      DX-CHEST-PORTABLE (1 VIEW)   Final Result      No acute cardiopulmonary abnormality.      NM-GASTRIC EMPTYING    (Results Pending)         MDM (Data Review):   -Records reviewed and summarized in current documentation  -I personally reviewed and interpreted the laboratory results  -I personally reviewed the radiology images    Assessment/Recommendations:  Impressions:  Abdominal pain s/p cholecystectomy-fluid collection in gallbladder fossa  Transaminitis-total bilirubin normal.  Elevated alkaline phosphatase   GODWIN r/o on US    RECOMMENDATIONS:  EGD normal, gastric emptying study pending   Can continue PPI   Isoenzymes of alk phos pending  HIV test ordered as this can cause elevated AST  Ask about menstrual cycle with iron  def anemia. If not significant bleeding we need to further evaluate    Plan of care discussed with patient, Dr. Boateng and Dr. Butler    Core Quality Measures   Reviewed items::  Labs, Medications and Radiology reports reviewed

## 2023-08-30 NOTE — PROGRESS NOTES
I called report to JAY JAY Mao on Tracey 6.  Patient was transported with all of her belongings chart, and medication via gurney by transport.

## 2023-08-30 NOTE — PROGRESS NOTES
Pt arrived to unit. Minimal complaints of pain. No n/v. Ambulated self from gurney to bed. Refuses bed alarm. GI consulted, gastric emptying study tomorrow. Npo at midnight.

## 2023-08-30 NOTE — CARE PLAN
The patient is Stable - Low risk of patient condition declining or worsening    Shift Goals  Clinical Goals: pain control; Gastric emptying  Patient Goals: Find answers    Progress made toward(s) clinical / shift goals:  Ongoing    Patient is progressing towards the following goals:      Problem: Pain - Standard  Goal: Alleviation of pain or a reduction in pain to the patient’s comfort goal  Outcome: Progressing     Problem: Wound/ / Incision Healing  Goal: Patient's wound/surgical incision will decrease in size and heals properly  Outcome: Progressing     Problem: Knowledge Deficit - Standard  Goal: Patient and family/care givers will demonstrate understanding of plan of care, disease process/condition, diagnostic tests and medications  Outcome: Progressing

## 2023-08-30 NOTE — PROGRESS NOTES
Hospital Medicine Daily Progress Note    Date of Service  8/30/2023    Chief Complaint  Vomiting    Hospital Course  Yenni Nieto is a 33 y.o. female with history of seizures, and recent cholecystectomy 8/19, who presented 8/28/2023 with intractable pain after eating. She states that about 1 hour after eating she has very sharp pain in the epigastric area . Patient was recently admitted on 8/25-8/27 for the same complaint. MRCP was done on last admission and noted mildly dilated CBD at 10mm, with no choledocholithiasis with redemonstration of collection in the gallbladder fossa similar to prior which was felt to be postop seroma, hematoma or biloma. HIDA scan showed no evidence of leak.  At that time, she was felt to have passed a biliary duct stone and was discharged on 8/27. However She continues to feel nauseous and vomiting and cannot keep anything down.      Here right upper quadrant ultrasound showed interval cholecystectomy with again noted 3.9 x 2.8 x 2.7 cm fluid collection in the gallbladder fossa.  LFTs elevated, with ALT of 150, AST of 104, alkaline phosphatase of 1110, with normal bilirubin and lipase.      GI was consulted, patient underwent EGD 8/29 with hiatal hernia, unremarkable finding.   Gastric emptying study    Interval Problem Update  -Notes were reviewed from GI, prior records, radiology, nursing etc.  -Reviewed labs to date, microbiology for current admit and prior  -Imaging was independently reviewed and interpreted    Patient is noted moving in the crews way  Reports sharp abd pain after eating  Discussed with GI, pending gastric empty scan  ALP 1058, await isoenzyme     Consultants/Specialty  GI    Code Status  Full Code    Disposition  The patient is not medically cleared for discharge to home or a post-acute facility.      Anticipate discharge to home once medically cleared.  I have placed the appropriate orders for post-discharge needs.    Review of Systems  Review of Systems    Gastrointestinal:  Positive for abdominal pain, nausea and vomiting.   All other systems reviewed and are negative.       Pertinent positives/negatives as mentioned above.     A complete review of systems was personally done by me. All other systems were negative.       Physical Exam  Temp:  [36.2 °C (97.2 °F)-36.6 °C (97.9 °F)] 36.2 °C (97.2 °F)  Pulse:  [60-65] 60  Resp:  [16-18] 18  BP: (111-138)/(58-74) 111/70  SpO2:  [95 %-98 %] 98 %    Physical Exam  Vitals and nursing note reviewed.   Constitutional:       General: She is not in acute distress.     Appearance: Normal appearance. She is normal weight. She is not ill-appearing or diaphoretic.   HENT:      Head: Normocephalic and atraumatic.      Right Ear: External ear normal.      Left Ear: External ear normal.      Mouth/Throat:      Mouth: Mucous membranes are moist.      Pharynx: No oropharyngeal exudate or posterior oropharyngeal erythema.   Eyes:      General: No scleral icterus.     Extraocular Movements: Extraocular movements intact.      Conjunctiva/sclera: Conjunctivae normal.      Pupils: Pupils are equal, round, and reactive to light.   Cardiovascular:      Rate and Rhythm: Normal rate and regular rhythm.      Heart sounds: Normal heart sounds. No murmur heard.  Pulmonary:      Effort: Pulmonary effort is normal. No respiratory distress.      Breath sounds: Normal breath sounds. No stridor. No wheezing, rhonchi or rales.   Chest:      Chest wall: No tenderness.   Abdominal:      General: Bowel sounds are normal. There is no distension.      Palpations: Abdomen is soft. There is no mass.      Tenderness: There is abdominal tenderness (Epigastric). There is no guarding or rebound.      Comments: Surgical scar from recent cholecystectomy noted    Musculoskeletal:         General: No swelling. Normal range of motion.      Cervical back: Normal range of motion and neck supple. No rigidity. No muscular tenderness.      Right lower leg: No edema.       Left lower leg: No edema.   Lymphadenopathy:      Cervical: No cervical adenopathy.   Skin:     General: Skin is warm and dry.      Coloration: Skin is not jaundiced.      Findings: No rash.   Neurological:      General: No focal deficit present.      Mental Status: She is alert and oriented to person, place, and time. Mental status is at baseline.      Cranial Nerves: No cranial nerve deficit.   Psychiatric:         Mood and Affect: Mood normal.         Behavior: Behavior normal.         Thought Content: Thought content normal.         Judgment: Judgment normal.         Fluids  No intake or output data in the 24 hours ending 08/30/23 1532      Laboratory  Recent Labs     08/28/23  0755 08/29/23  0024 08/30/23  0014   WBC 7.9 6.6 8.1   RBC 4.78 3.92* 4.36   HEMOGLOBIN 10.3* 8.3* 9.1*   HEMATOCRIT 33.0* 27.6* 30.1*   MCV 69.0* 70.4* 69.0*   MCH 21.5* 21.2* 20.9*   MCHC 31.2* 30.1* 30.2*   RDW 46.3 47.7 45.8   PLATELETCT 181 174 187   MPV 10.1 9.7 9.8       Recent Labs     08/28/23  0755 08/29/23  0024 08/30/23  0014   SODIUM 138 140 141   POTASSIUM 3.5* 3.8 3.5*   CHLORIDE 102 106 105   CO2 25 24 24   GLUCOSE 96 84 95   BUN 9 4* 6*   CREATININE 0.51 0.47* 0.49*   CALCIUM 8.7 8.5 8.4*       Recent Labs     08/28/23  0755   APTT 26.3   INR 1.05                 Imaging  US-RUQ   Final Result      1.  Interval cholecystectomy.   2.  3.9 by 2.8 x 2.7 cm fluid collection in the gallbladder fossa could be a seroma, hematoma or biloma. Infection is not excluded.      DX-CHEST-PORTABLE (1 VIEW)   Final Result      No acute cardiopulmonary abnormality.      NM-GASTRIC EMPTYING    (Results Pending)          Assessment/Plan  * Intractable nausea and vomiting- (present on admission)  Assessment & Plan  - Unclear etiology.  Had gallbladder removed 8/19, but symptoms persisted.  EGD only shows hiatal hernia, otherwise unremarkable  -Will get a gastric emptying study.  Multimodal pain managements including po and iv narcotics prn.  Monitoring respiratory status and sedation score  antiemetics        Hypokalemia- (present on admission)  Assessment & Plan  - Replaced.  Potassium has normalized.  Continue to trend.  BMP in the morning.    S/P laparoscopic cholecystectomy- (present on admission)  Assessment & Plan  S/p cholecystectomy 8/19  Right upper quadrant ultrasound showed interval cholecystectomy with again noted 3.9 x 2.8 x 2.7 cm fluid collection in the gallbladder fossa  -Reconsult surgery if warranted and if gastric emptying study is negative and patient continues to be symptomatic.    Seizures (HCC)- (present on admission)  Assessment & Plan  -Continue home Lamictal.      Iron deficiency anemia- (present on admission)  Assessment & Plan  -Replace po once able to tolerate    Elevated liver enzymes- (present on admission)  Assessment & Plan  Unclear etiology. May be related to recent cholecystectomy? suspected that she may have passed biliary stone?  Check hepatitis panel   Cont monitoring     Elevated alkaline phosphatase level- (present on admission)  Assessment & Plan  Unclear etiology  Check isoenzyme to differentiate this elevation is from GI vs bone  Discussed with GI         VTE prophylaxis:   SCDs/TEDs      Patient is has a high medical complexity, complex decision making and is at high risk for complication, morbidity, and mortality.  I spent 53 minutes, reviewing the chart, obtaining and/or reviewing separately obtained history. Performing a medically appropriate examination and evaluation.  Counseling and educating the patient. Ordering and reviewing medications, tests, or procedures.  Discussing the case with GI.  Documenting clinical information in EPIC. Independently interpreting results and communicating results to patient. Discussing future disposition of care with patient, RN and case management.  This does not include time spent on separately billable procedures/tests.

## 2023-08-31 ENCOUNTER — APPOINTMENT (OUTPATIENT)
Dept: RADIOLOGY | Facility: MEDICAL CENTER | Age: 33
DRG: 446 | End: 2023-08-31
Attending: INTERNAL MEDICINE

## 2023-08-31 ENCOUNTER — ANESTHESIA (OUTPATIENT)
Dept: SURGERY | Facility: MEDICAL CENTER | Age: 33
DRG: 446 | End: 2023-08-31

## 2023-08-31 ENCOUNTER — ANESTHESIA EVENT (OUTPATIENT)
Dept: SURGERY | Facility: MEDICAL CENTER | Age: 33
DRG: 446 | End: 2023-08-31

## 2023-08-31 LAB
1,25(OH)2D3 SERPL-MCNC: 29.8 PG/ML (ref 19.9–79.3)
ALBUMIN SERPL BCP-MCNC: 3.5 G/DL (ref 3.2–4.9)
ALBUMIN/GLOB SERPL: 1.5 G/DL
ALP SERPL-CCNC: 859 U/L (ref 30–99)
ALT SERPL-CCNC: 166 U/L (ref 2–50)
ANION GAP SERPL CALC-SCNC: 9 MMOL/L (ref 7–16)
AST SERPL-CCNC: 209 U/L (ref 12–45)
BILIRUB SERPL-MCNC: 2.1 MG/DL (ref 0.1–1.5)
BUN SERPL-MCNC: 6 MG/DL (ref 8–22)
CALCIUM ALBUM COR SERPL-MCNC: 8.7 MG/DL (ref 8.5–10.5)
CALCIUM SERPL-MCNC: 8.3 MG/DL (ref 8.5–10.5)
CHLORIDE SERPL-SCNC: 107 MMOL/L (ref 96–112)
CO2 SERPL-SCNC: 22 MMOL/L (ref 20–33)
CREAT SERPL-MCNC: 0.51 MG/DL (ref 0.5–1.4)
EKG IMPRESSION: NORMAL
ERYTHROCYTE [DISTWIDTH] IN BLOOD BY AUTOMATED COUNT: 46.5 FL (ref 35.9–50)
GFR SERPLBLD CREATININE-BSD FMLA CKD-EPI: 127 ML/MIN/1.73 M 2
GLOBULIN SER CALC-MCNC: 2.3 G/DL (ref 1.9–3.5)
GLUCOSE SERPL-MCNC: 105 MG/DL (ref 65–99)
HCT VFR BLD AUTO: 26.4 % (ref 37–47)
HGB BLD-MCNC: 8 G/DL (ref 12–16)
MAGNESIUM SERPL-MCNC: 1.6 MG/DL (ref 1.5–2.5)
MCH RBC QN AUTO: 20.9 PG (ref 27–33)
MCHC RBC AUTO-ENTMCNC: 30.3 G/DL (ref 32.2–35.5)
MCV RBC AUTO: 69.1 FL (ref 81.4–97.8)
MITOCHONDRIA M2 IGG SER-ACNC: 16.5 UNITS (ref 0–24.9)
NUCLEAR IGG SER QL IA: NORMAL
PHOSPHATE SERPL-MCNC: 3.5 MG/DL (ref 2.5–4.5)
PLATELET # BLD AUTO: 177 K/UL (ref 164–446)
PMV BLD AUTO: 9.7 FL (ref 9–12.9)
POTASSIUM SERPL-SCNC: 3.5 MMOL/L (ref 3.6–5.5)
PROT SERPL-MCNC: 5.8 G/DL (ref 6–8.2)
RBC # BLD AUTO: 3.82 M/UL (ref 4.2–5.4)
SMA IGG SER-ACNC: 9 UNITS (ref 0–19)
SODIUM SERPL-SCNC: 138 MMOL/L (ref 135–145)
WBC # BLD AUTO: 6.7 K/UL (ref 4.8–10.8)

## 2023-08-31 PROCEDURE — 85027 COMPLETE CBC AUTOMATED: CPT

## 2023-08-31 PROCEDURE — 43274 ERCP DUCT STENT PLACEMENT: CPT | Mod: 59 | Performed by: INTERNAL MEDICINE

## 2023-08-31 PROCEDURE — 36415 COLL VENOUS BLD VENIPUNCTURE: CPT

## 2023-08-31 PROCEDURE — A9270 NON-COVERED ITEM OR SERVICE: HCPCS | Performed by: HOSPITALIST

## 2023-08-31 PROCEDURE — C1889 IMPLANT/INSERT DEVICE, NOC: HCPCS | Performed by: INTERNAL MEDICINE

## 2023-08-31 PROCEDURE — 160035 HCHG PACU - 1ST 60 MINS PHASE I: Performed by: INTERNAL MEDICINE

## 2023-08-31 PROCEDURE — 99233 SBSQ HOSP IP/OBS HIGH 50: CPT | Performed by: STUDENT IN AN ORGANIZED HEALTH CARE EDUCATION/TRAINING PROGRAM

## 2023-08-31 PROCEDURE — A9270 NON-COVERED ITEM OR SERVICE: HCPCS | Performed by: STUDENT IN AN ORGANIZED HEALTH CARE EDUCATION/TRAINING PROGRAM

## 2023-08-31 PROCEDURE — C1769 GUIDE WIRE: HCPCS | Performed by: INTERNAL MEDICINE

## 2023-08-31 PROCEDURE — 43262 ENDO CHOLANGIOPANCREATOGRAPH: CPT | Performed by: INTERNAL MEDICINE

## 2023-08-31 PROCEDURE — 160208 HCHG ENDO MINUTES - EA ADDL 1 MIN LEVEL 4: Performed by: INTERNAL MEDICINE

## 2023-08-31 PROCEDURE — C2617 STENT, NON-COR, TEM W/O DEL: HCPCS | Performed by: INTERNAL MEDICINE

## 2023-08-31 PROCEDURE — 700105 HCHG RX REV CODE 258: Performed by: ANESTHESIOLOGY

## 2023-08-31 PROCEDURE — 160009 HCHG ANES TIME/MIN: Performed by: INTERNAL MEDICINE

## 2023-08-31 PROCEDURE — 700111 HCHG RX REV CODE 636 W/ 250 OVERRIDE (IP): Mod: JZ | Performed by: ANESTHESIOLOGY

## 2023-08-31 PROCEDURE — 93010 ELECTROCARDIOGRAM REPORT: CPT | Performed by: INTERNAL MEDICINE

## 2023-08-31 PROCEDURE — 700101 HCHG RX REV CODE 250: Performed by: ANESTHESIOLOGY

## 2023-08-31 PROCEDURE — 700102 HCHG RX REV CODE 250 W/ 637 OVERRIDE(OP): Performed by: HOSPITALIST

## 2023-08-31 PROCEDURE — 700111 HCHG RX REV CODE 636 W/ 250 OVERRIDE (IP): Performed by: STUDENT IN AN ORGANIZED HEALTH CARE EDUCATION/TRAINING PROGRAM

## 2023-08-31 PROCEDURE — 700102 HCHG RX REV CODE 250 W/ 637 OVERRIDE(OP): Performed by: STUDENT IN AN ORGANIZED HEALTH CARE EDUCATION/TRAINING PROGRAM

## 2023-08-31 PROCEDURE — 160048 HCHG OR STATISTICAL LEVEL 1-5: Performed by: INTERNAL MEDICINE

## 2023-08-31 PROCEDURE — 770006 HCHG ROOM/CARE - MED/SURG/GYN SEMI*

## 2023-08-31 PROCEDURE — 80053 COMPREHEN METABOLIC PANEL: CPT

## 2023-08-31 PROCEDURE — 0F798DZ DILATION OF COMMON BILE DUCT WITH INTRALUMINAL DEVICE, VIA NATURAL OR ARTIFICIAL OPENING ENDOSCOPIC: ICD-10-PCS | Performed by: INTERNAL MEDICINE

## 2023-08-31 PROCEDURE — 83735 ASSAY OF MAGNESIUM: CPT

## 2023-08-31 PROCEDURE — 0F7D8DZ DILATION OF PANCREATIC DUCT WITH INTRALUMINAL DEVICE, VIA NATURAL OR ARTIFICIAL OPENING ENDOSCOPIC: ICD-10-PCS | Performed by: INTERNAL MEDICINE

## 2023-08-31 PROCEDURE — 700111 HCHG RX REV CODE 636 W/ 250 OVERRIDE (IP): Mod: JZ | Performed by: INTERNAL MEDICINE

## 2023-08-31 PROCEDURE — 0FCC8ZZ EXTIRPATION OF MATTER FROM AMPULLA OF VATER, VIA NATURAL OR ARTIFICIAL OPENING ENDOSCOPIC: ICD-10-PCS | Performed by: INTERNAL MEDICINE

## 2023-08-31 PROCEDURE — 700117 HCHG RX CONTRAST REV CODE 255: Performed by: INTERNAL MEDICINE

## 2023-08-31 PROCEDURE — 84100 ASSAY OF PHOSPHORUS: CPT

## 2023-08-31 PROCEDURE — 160002 HCHG RECOVERY MINUTES (STAT): Performed by: INTERNAL MEDICINE

## 2023-08-31 PROCEDURE — 700111 HCHG RX REV CODE 636 W/ 250 OVERRIDE (IP): Performed by: INTERNAL MEDICINE

## 2023-08-31 PROCEDURE — 700111 HCHG RX REV CODE 636 W/ 250 OVERRIDE (IP): Mod: JZ | Performed by: HOSPITALIST

## 2023-08-31 PROCEDURE — 43264 ERCP REMOVE DUCT CALCULI: CPT | Performed by: INTERNAL MEDICINE

## 2023-08-31 PROCEDURE — 160203 HCHG ENDO MINUTES - 1ST 30 MINS LEVEL 4: Performed by: INTERNAL MEDICINE

## 2023-08-31 PROCEDURE — 700111 HCHG RX REV CODE 636 W/ 250 OVERRIDE (IP): Performed by: ANESTHESIOLOGY

## 2023-08-31 DEVICE — STENT PANCREATIC PIGTAIL ADVANIX 5FR X 5CM: Type: IMPLANTABLE DEVICE | Site: ESOPHAGUS | Status: FUNCTIONAL

## 2023-08-31 DEVICE — ADVANIX BILIARY CENTER BEND 10X5: Type: IMPLANTABLE DEVICE | Site: ESOPHAGUS | Status: FUNCTIONAL

## 2023-08-31 RX ORDER — SODIUM CHLORIDE, SODIUM LACTATE, POTASSIUM CHLORIDE, CALCIUM CHLORIDE 600; 310; 30; 20 MG/100ML; MG/100ML; MG/100ML; MG/100ML
INJECTION, SOLUTION INTRAVENOUS CONTINUOUS
Status: DISCONTINUED | OUTPATIENT
Start: 2023-08-31 | End: 2023-08-31 | Stop reason: HOSPADM

## 2023-08-31 RX ORDER — ROCURONIUM BROMIDE 10 MG/ML
INJECTION, SOLUTION INTRAVENOUS PRN
Status: DISCONTINUED | OUTPATIENT
Start: 2023-08-31 | End: 2023-08-31 | Stop reason: SURG

## 2023-08-31 RX ORDER — MEPERIDINE HYDROCHLORIDE 25 MG/ML
6.25 INJECTION INTRAMUSCULAR; INTRAVENOUS; SUBCUTANEOUS
Status: DISCONTINUED | OUTPATIENT
Start: 2023-08-31 | End: 2023-08-31 | Stop reason: HOSPADM

## 2023-08-31 RX ORDER — SODIUM CHLORIDE, SODIUM LACTATE, POTASSIUM CHLORIDE, CALCIUM CHLORIDE 600; 310; 30; 20 MG/100ML; MG/100ML; MG/100ML; MG/100ML
INJECTION, SOLUTION INTRAVENOUS
Status: DISCONTINUED | OUTPATIENT
Start: 2023-08-31 | End: 2023-08-31 | Stop reason: SURG

## 2023-08-31 RX ORDER — OXYCODONE HCL 5 MG/5 ML
10 SOLUTION, ORAL ORAL
Status: DISCONTINUED | OUTPATIENT
Start: 2023-08-31 | End: 2023-08-31 | Stop reason: HOSPADM

## 2023-08-31 RX ORDER — POTASSIUM CHLORIDE 7.45 MG/ML
10 INJECTION INTRAVENOUS
Status: ACTIVE | OUTPATIENT
Start: 2023-08-31 | End: 2023-08-31

## 2023-08-31 RX ORDER — POTASSIUM CHLORIDE 7.45 MG/ML
10 INJECTION INTRAVENOUS
Status: DISPENSED | OUTPATIENT
Start: 2023-08-31 | End: 2023-08-31

## 2023-08-31 RX ORDER — DIPHENHYDRAMINE HYDROCHLORIDE 50 MG/ML
12.5 INJECTION INTRAMUSCULAR; INTRAVENOUS
Status: DISCONTINUED | OUTPATIENT
Start: 2023-08-31 | End: 2023-08-31 | Stop reason: HOSPADM

## 2023-08-31 RX ORDER — DEXAMETHASONE SODIUM PHOSPHATE 4 MG/ML
INJECTION, SOLUTION INTRA-ARTICULAR; INTRALESIONAL; INTRAMUSCULAR; INTRAVENOUS; SOFT TISSUE PRN
Status: DISCONTINUED | OUTPATIENT
Start: 2023-08-31 | End: 2023-08-31 | Stop reason: SURG

## 2023-08-31 RX ORDER — OXYCODONE HCL 5 MG/5 ML
5 SOLUTION, ORAL ORAL
Status: DISCONTINUED | OUTPATIENT
Start: 2023-08-31 | End: 2023-08-31 | Stop reason: HOSPADM

## 2023-08-31 RX ORDER — ONDANSETRON 2 MG/ML
4 INJECTION INTRAMUSCULAR; INTRAVENOUS
Status: DISCONTINUED | OUTPATIENT
Start: 2023-08-31 | End: 2023-08-31 | Stop reason: HOSPADM

## 2023-08-31 RX ORDER — HALOPERIDOL 5 MG/ML
1 INJECTION INTRAMUSCULAR
Status: DISCONTINUED | OUTPATIENT
Start: 2023-08-31 | End: 2023-08-31 | Stop reason: HOSPADM

## 2023-08-31 RX ORDER — POTASSIUM CHLORIDE 20 MEQ/1
40 TABLET, EXTENDED RELEASE ORAL ONCE
Status: DISCONTINUED | OUTPATIENT
Start: 2023-08-31 | End: 2023-08-31

## 2023-08-31 RX ORDER — ONDANSETRON 2 MG/ML
INJECTION INTRAMUSCULAR; INTRAVENOUS PRN
Status: DISCONTINUED | OUTPATIENT
Start: 2023-08-31 | End: 2023-08-31 | Stop reason: SURG

## 2023-08-31 RX ORDER — LIDOCAINE HYDROCHLORIDE 20 MG/ML
INJECTION, SOLUTION EPIDURAL; INFILTRATION; INTRACAUDAL; PERINEURAL PRN
Status: DISCONTINUED | OUTPATIENT
Start: 2023-08-31 | End: 2023-08-31 | Stop reason: SURG

## 2023-08-31 RX ADMIN — FENTANYL CITRATE 50 MCG: 50 INJECTION, SOLUTION INTRAMUSCULAR; INTRAVENOUS at 14:32

## 2023-08-31 RX ADMIN — HYDROMORPHONE HYDROCHLORIDE 0.5 MG: 1 INJECTION, SOLUTION INTRAMUSCULAR; INTRAVENOUS; SUBCUTANEOUS at 02:07

## 2023-08-31 RX ADMIN — HALOPERIDOL LACTATE 1 MG: 5 INJECTION, SOLUTION INTRAMUSCULAR at 15:34

## 2023-08-31 RX ADMIN — METOCLOPRAMIDE 10 MG: 5 INJECTION, SOLUTION INTRAMUSCULAR; INTRAVENOUS at 02:07

## 2023-08-31 RX ADMIN — SENNOSIDES AND DOCUSATE SODIUM 2 TABLET: 50; 8.6 TABLET ORAL at 05:54

## 2023-08-31 RX ADMIN — LAMOTRIGINE 100 MG: 100 TABLET ORAL at 05:54

## 2023-08-31 RX ADMIN — ONDANSETRON 4 MG: 2 INJECTION INTRAMUSCULAR; INTRAVENOUS at 14:36

## 2023-08-31 RX ADMIN — HYDROMORPHONE HYDROCHLORIDE 0.5 MG: 1 INJECTION, SOLUTION INTRAMUSCULAR; INTRAVENOUS; SUBCUTANEOUS at 09:07

## 2023-08-31 RX ADMIN — OXYCODONE HYDROCHLORIDE 10 MG: 10 TABLET ORAL at 00:40

## 2023-08-31 RX ADMIN — LIDOCAINE HYDROCHLORIDE 100 MG: 20 INJECTION, SOLUTION EPIDURAL; INFILTRATION; INTRACAUDAL at 14:32

## 2023-08-31 RX ADMIN — MIDAZOLAM 2 MG: 1 INJECTION, SOLUTION INTRAMUSCULAR; INTRAVENOUS at 14:29

## 2023-08-31 RX ADMIN — FENTANYL CITRATE 50 MCG: 50 INJECTION, SOLUTION INTRAMUSCULAR; INTRAVENOUS at 15:40

## 2023-08-31 RX ADMIN — METOCLOPRAMIDE 10 MG: 5 INJECTION, SOLUTION INTRAMUSCULAR; INTRAVENOUS at 08:14

## 2023-08-31 RX ADMIN — ROCURONIUM BROMIDE 50 MG: 50 INJECTION, SOLUTION INTRAVENOUS at 14:32

## 2023-08-31 RX ADMIN — FENTANYL CITRATE 50 MCG: 50 INJECTION, SOLUTION INTRAMUSCULAR; INTRAVENOUS at 14:53

## 2023-08-31 RX ADMIN — FAMOTIDINE 20 MG: 10 INJECTION INTRAVENOUS at 05:55

## 2023-08-31 RX ADMIN — PROPOFOL 200 MG: 10 INJECTION, EMULSION INTRAVENOUS at 14:32

## 2023-08-31 RX ADMIN — DEXAMETHASONE SODIUM PHOSPHATE 4 MG: 4 INJECTION INTRA-ARTICULAR; INTRALESIONAL; INTRAMUSCULAR; INTRAVENOUS; SOFT TISSUE at 14:36

## 2023-08-31 RX ADMIN — KETOROLAC TROMETHAMINE 30 MG: 30 INJECTION, SOLUTION INTRAMUSCULAR; INTRAVENOUS at 11:32

## 2023-08-31 RX ADMIN — SUGAMMADEX 200 MG: 100 INJECTION, SOLUTION INTRAVENOUS at 15:03

## 2023-08-31 RX ADMIN — METOCLOPRAMIDE 10 MG: 5 INJECTION, SOLUTION INTRAMUSCULAR; INTRAVENOUS at 21:07

## 2023-08-31 RX ADMIN — SODIUM CHLORIDE, POTASSIUM CHLORIDE, SODIUM LACTATE AND CALCIUM CHLORIDE: 600; 310; 30; 20 INJECTION, SOLUTION INTRAVENOUS at 14:29

## 2023-08-31 RX ADMIN — FAMOTIDINE 20 MG: 10 INJECTION INTRAVENOUS at 18:38

## 2023-08-31 RX ADMIN — HYDROMORPHONE HYDROCHLORIDE 0.5 MG: 1 INJECTION, SOLUTION INTRAMUSCULAR; INTRAVENOUS; SUBCUTANEOUS at 05:54

## 2023-08-31 RX ADMIN — POTASSIUM CHLORIDE 10 MEQ: 7.46 INJECTION, SOLUTION INTRAVENOUS at 12:17

## 2023-08-31 RX ADMIN — LAMOTRIGINE 100 MG: 100 TABLET ORAL at 18:29

## 2023-08-31 RX ADMIN — KETOROLAC TROMETHAMINE 30 MG: 30 INJECTION, SOLUTION INTRAMUSCULAR; INTRAVENOUS at 19:03

## 2023-08-31 ASSESSMENT — ENCOUNTER SYMPTOMS
COUGH: 0
SHORTNESS OF BREATH: 0
FEVER: 0
CONSTIPATION: 0
HEARTBURN: 0
DEPRESSION: 0
DIZZINESS: 0
VOMITING: 0
CHILLS: 0
NAUSEA: 0
BACK PAIN: 0
NAUSEA: 1
WEAKNESS: 0
VOMITING: 1
ABDOMINAL PAIN: 1
DIARRHEA: 1
BLOOD IN STOOL: 0
BLURRED VISION: 0

## 2023-08-31 ASSESSMENT — PATIENT HEALTH QUESTIONNAIRE - PHQ9
1. LITTLE INTEREST OR PLEASURE IN DOING THINGS: NOT AT ALL
SUM OF ALL RESPONSES TO PHQ9 QUESTIONS 1 AND 2: 0
2. FEELING DOWN, DEPRESSED, IRRITABLE, OR HOPELESS: NOT AT ALL

## 2023-08-31 ASSESSMENT — PAIN DESCRIPTION - PAIN TYPE
TYPE: ACUTE PAIN

## 2023-08-31 ASSESSMENT — COGNITIVE AND FUNCTIONAL STATUS - GENERAL
MOBILITY SCORE: 24
SUGGESTED CMS G CODE MODIFIER MOBILITY: CH
DAILY ACTIVITIY SCORE: 24
SUGGESTED CMS G CODE MODIFIER DAILY ACTIVITY: CH

## 2023-08-31 NOTE — PROGRESS NOTES
Patient returned to unit s/p ERCP. Awake and alert, ambulated to bathroom and back to bed with steady gait. Denies pain or nausea. Medicated for both in PACU. VSS. Declined ice chips or liquids for now. Updated on POC. Call light and personal belongings within reach. All needs met at this time.      IV potassium reconnected upon arrival to unit, patient not tolerating dose with complaints of intolerable burning at IV site. No s/s of infiltration.     Message sent to Dr. Boateng to update on procedure result as well as intolerance of IV potassium.

## 2023-08-31 NOTE — OR NURSING
1511 pt arrived from, OR, report received pt asleep on 4L mask VSS    1515 report to Marta GOYAL.

## 2023-08-31 NOTE — OP REPORT
OPERATIVE REPORT    PATIENT:   Yenni Nieto   1990       PREOPERATIVE DIAGNOSES/INDICATIONS: Biliary colic, abnormal biochemical liver test, common bile duct dilation, presumed choledocholithiasis    PROCEDURE: ERCP with biliary sphincterotomy, calculi removal, pancreatic duct stent placement, biliary duct stent placement    PHYSICIAN:  Bharathi Canseco MD     ANESTHESIA:  Per anesthesiologist.  MANOLO    LOCATION: [facility]    CONSENT: The risks, benefits and alternatives of the procedure were discussed in detail. The risks include and are not limited to bleeding, infection, perforation, missed lesions, and sedations risks (cardiopulmonary compromise and allergic reaction to medications).    DESCRIPTION:   The patient presented to the operating room.  A time out was performed prior to beginning the procedure.   The patient was placed in the supine position. Patient was sedated by anesthesia: GETA.    OPERATIVE FINDINGS:    Endoscope advanced under indirect visualization the second portion of the duodenum.  Ampulla engorged with impacted stone.  The pancreatic duct was cannulated.  Pancreatico\gram was normal.  A 5 Polish by 5 cm single pigtail stent was deployed 5 cm into the pancreatic duct.  Using needle-knife technique the common bile duct was cannulated with a 3 mm precut sphincterotomy.  The sphincterotomy was extended 1.2 cm using standard sphincterotome.  A stone was removed which was impacted at the ampulla.  Cholangiogram otherwise normal.  Given inflammation and extent of sphincterotomy required a 10 Polish by 5 cm plastic biliary stent was placed prophylactically postoperatively to minimize risk for microperforation related sphincterotomy.      Blood loss: None    The patient tolerated the procedure well.      There were no immediate complications.    Pathology samples obtained: None    IMPRESSION:  #Choledocholithiasis  #Single pigtail plastic pancreatic stent placed  #Single biliary stent 10  Thai by 5 cm placed    RECOMMENDATIONS:  Observe clinical course  Clear liquid diet this evening  Advance diet tomorrow as tolerated  Repeat ERCP in 4 to 8 weeks for stent removal  Trend biochemical liver tests.  Anticipate normalization in the coming 10 to 14 days.

## 2023-08-31 NOTE — PROGRESS NOTES
Hospital Medicine Daily Progress Note    Date of Service  8/31/2023    Chief Complaint  Vomiting    Hospital Course  Yenni Nieto is a 33 y.o. female with history of seizures, and recent cholecystectomy 8/19, who presented 8/28/2023 with intractable pain after eating. She states that about 1 hour after eating she has very sharp pain in the epigastric area . Patient was recently admitted on 8/25-8/27 for the same complaint. MRCP was done on last admission and noted mildly dilated CBD at 10mm, with no choledocholithiasis with redemonstration of collection in the gallbladder fossa similar to prior which was felt to be postop seroma, hematoma or biloma. HIDA scan showed no evidence of leak.  At that time, she was felt to have passed a biliary duct stone and was discharged on 8/27. However She continues to feel nauseous and vomiting and cannot keep anything down.      Here right upper quadrant ultrasound showed interval cholecystectomy with again noted 3.9 x 2.8 x 2.7 cm fluid collection in the gallbladder fossa.  LFTs elevated, with ALT of 150, AST of 104, alkaline phosphatase of 1110, with normal bilirubin and lipase.      GI was consulted, patient underwent EGD 8/29 with hiatal hernia, unremarkable finding.   Gastric emptying study: Significantly delayed gastric emptying  ERCP 8/31    Interval Problem Update  -Notes were reviewed from GI, prior records, radiology, nursing etc.  -Reviewed labs to date, microbiology for current admit and prior  -Imaging was independently reviewed and interpreted    Significant abdominal pain, nausea and vomiting yesterday.   Elevated LFT. Elevated bilirubin  I have discussed with GI, planning ERCP today  NPO  Reviewed gastric empty study  K 3.5, replaced with Iv KCL. Unable to take po supplements     Consultants/Specialty  GI    Code Status  Full Code    Disposition  The patient is not medically cleared for discharge to home or a post-acute facility.      Anticipate discharge to  home once medically cleared.  I have placed the appropriate orders for post-discharge needs.    Review of Systems  Review of Systems   Gastrointestinal:  Positive for abdominal pain, nausea and vomiting.   All other systems reviewed and are negative.       Pertinent positives/negatives as mentioned above.     A complete review of systems was personally done by me. All other systems were negative.       Physical Exam  Temp:  [36.2 °C (97.2 °F)-36.7 °C (98 °F)] 36.2 °C (97.2 °F)  Pulse:  [61-91] 72  Resp:  [12-21] 14  BP: (114-141)/(66-79) 115/66  SpO2:  [92 %-100 %] 92 %    Physical Exam  Vitals and nursing note reviewed.   Constitutional:       General: She is not in acute distress.     Appearance: Normal appearance. She is normal weight. She is not ill-appearing or diaphoretic.   HENT:      Head: Normocephalic and atraumatic.      Right Ear: External ear normal.      Left Ear: External ear normal.      Mouth/Throat:      Mouth: Mucous membranes are moist.      Pharynx: No oropharyngeal exudate or posterior oropharyngeal erythema.   Eyes:      General: No scleral icterus.     Extraocular Movements: Extraocular movements intact.      Conjunctiva/sclera: Conjunctivae normal.      Pupils: Pupils are equal, round, and reactive to light.   Cardiovascular:      Rate and Rhythm: Normal rate and regular rhythm.      Heart sounds: Normal heart sounds. No murmur heard.  Pulmonary:      Effort: Pulmonary effort is normal. No respiratory distress.      Breath sounds: Normal breath sounds. No stridor. No wheezing, rhonchi or rales.   Chest:      Chest wall: No tenderness.   Abdominal:      General: Bowel sounds are normal. There is no distension.      Palpations: Abdomen is soft. There is no mass.      Tenderness: There is abdominal tenderness (Epigastric). There is no guarding or rebound.      Comments: Surgical scar from recent cholecystectomy noted    Musculoskeletal:         General: No swelling. Normal range of motion.       Cervical back: Normal range of motion and neck supple. No rigidity. No muscular tenderness.      Right lower leg: No edema.      Left lower leg: No edema.   Lymphadenopathy:      Cervical: No cervical adenopathy.   Skin:     General: Skin is warm and dry.      Coloration: Skin is not jaundiced.      Findings: No rash.   Neurological:      General: No focal deficit present.      Mental Status: She is alert and oriented to person, place, and time. Mental status is at baseline.      Cranial Nerves: No cranial nerve deficit.   Psychiatric:         Mood and Affect: Mood normal.         Behavior: Behavior normal.         Thought Content: Thought content normal.         Judgment: Judgment normal.         Fluids    Intake/Output Summary (Last 24 hours) at 8/31/2023 1637  Last data filed at 8/31/2023 1513  Gross per 24 hour   Intake 700 ml   Output --   Net 700 ml         Laboratory  Recent Labs     08/29/23  0024 08/30/23  0014 08/31/23  0204   WBC 6.6 8.1 6.7   RBC 3.92* 4.36 3.82*   HEMOGLOBIN 8.3* 9.1* 8.0*   HEMATOCRIT 27.6* 30.1* 26.4*   MCV 70.4* 69.0* 69.1*   MCH 21.2* 20.9* 20.9*   MCHC 30.1* 30.2* 30.3*   RDW 47.7 45.8 46.5   PLATELETCT 174 187 177   MPV 9.7 9.8 9.7       Recent Labs     08/29/23  0024 08/30/23  0014 08/31/23  0204   SODIUM 140 141 138   POTASSIUM 3.8 3.5* 3.5*   CHLORIDE 106 105 107   CO2 24 24 22   GLUCOSE 84 95 105*   BUN 4* 6* 6*   CREATININE 0.47* 0.49* 0.51   CALCIUM 8.5 8.4* 8.3*                       Imaging  NM-GASTRIC EMPTYING   Final Result      Significantly delayed gastric emptying.         US-RUQ   Final Result      1.  Interval cholecystectomy.   2.  3.9 by 2.8 x 2.7 cm fluid collection in the gallbladder fossa could be a seroma, hematoma or biloma. Infection is not excluded.      DX-CHEST-PORTABLE (1 VIEW)   Final Result      No acute cardiopulmonary abnormality.      DX-PORTABLE FLUORO > 1 HOUR    (Results Pending)          Assessment/Plan  * Intractable nausea and vomiting-  (present on admission)  Assessment & Plan  - Unclear etiology.  Had gallbladder removed 8/19, but symptoms persisted.  EGD only shows hiatal hernia, otherwise unremarkable  Gastric emptying study: significant delayed  Multimodal pain managements including po and iv narcotics prn. Monitoring respiratory status and sedation score  Antiemetics  8/31: ERCP today        Hypokalemia- (present on admission)  Assessment & Plan  - Replaced.  Potassium has normalized.  Continue to trend.  BMP in the morning.    S/P laparoscopic cholecystectomy- (present on admission)  Assessment & Plan  S/p cholecystectomy 8/19  Right upper quadrant ultrasound showed interval cholecystectomy with again noted 3.9 x 2.8 x 2.7 cm fluid collection in the gallbladder fossa  -Reconsult surgery if warranted and if gastric emptying study is negative and patient continues to be symptomatic.    Seizures (HCC)- (present on admission)  Assessment & Plan  -Continue home Lamictal.      Iron deficiency anemia- (present on admission)  Assessment & Plan  -Replace po once able to tolerate    Elevated liver enzymes- (present on admission)  Assessment & Plan  Unclear etiology. May be related to recent cholecystectomy? suspected that she may have passed biliary stone?  Check hepatitis panel   Cont monitoring   8/31: discussed with GI, ERCP today    Elevated alkaline phosphatase level- (present on admission)  Assessment & Plan  Persistently elevated   Iso enzyme pending         VTE prophylaxis:   SCDs/TEDs      Patient is has a high medical complexity, complex decision making and is at high risk for complication, morbidity, and mortality.  I spent 52 minutes, reviewing the chart, obtaining and/or reviewing separately obtained history. Performing a medically appropriate examination and evaluation.  Counseling and educating the patient. Ordering and reviewing medications, tests, or procedures.  Discussing the case with GI.  Documenting clinical information in EPIC.  Independently interpreting results and communicating results to patient. Discussing future disposition of care with patient, RN and case management.  This does not include time spent on separately billable procedures/tests.

## 2023-08-31 NOTE — ANESTHESIA TIME REPORT
Anesthesia Start and Stop Event Times     Date Time Event    8/31/2023 1411 Ready for Procedure     1429 Anesthesia Start     1513 Anesthesia Stop        Responsible Staff  08/31/23    Name Role Begin End    Nova Allen M.D. Anesth 1429 1513        Overtime Reason:  no overtime (within assigned shift)    Comments:

## 2023-08-31 NOTE — CARE PLAN
The patient is Watcher - Medium risk of patient condition declining or worsening    Shift Goals  Clinical Goals: pain and nausea management  Patient Goals: pain and nausea control, figure out what is causing this pain  Family Goals: none present    Progress made toward(s) clinical / shift goals:    Problem: Pain - Standard  Goal: Alleviation of pain or a reduction in pain to the patient’s comfort goal  Outcome: Progressing     Problem: Knowledge Deficit - Standard  Goal: Patient and family/care givers will demonstrate understanding of plan of care, disease process/condition, diagnostic tests and medications  Outcome: Progressing       Patient is not progressing towards the following goals: Unable to advance diet at this time due to NPO status pending ERCP.

## 2023-08-31 NOTE — PROGRESS NOTES
Gastroenterology Progress Note               Author:  Bharathi Canseco M.D. Date & Time Created: 8/31/2023 2:25 PM       Patient ID:  Name:             Yenni Nieto    YOB: 1990  Age:                 33 y.o.  female  MRN:               [id number]      Interval History:  Patient continues with nausea vomiting abdominal pain.      Hospital Medications:  Current Facility-Administered Medications   Medication Dose Frequency Provider Last Rate Last Admin    [MAR Hold] potassium chloride (Kcl) ivpb 10 mEq  10 mEq Q HOUR Elana Boateng M.D. 100 mL/hr at 08/31/23 1217 10 mEq at 08/31/23 1217    IOHEXOL 300 MG/ML INJ SOLN            [MAR Hold] oxyCODONE immediate-release (Roxicodone) tablet 5 mg  5 mg Q3HRS PRN Elana Boateng M.D.        Or    [MAR Hold] oxyCODONE immediate release (Roxicodone) tablet 10 mg  10 mg Q3HRS PRN Elana Boateng M.D.   10 mg at 08/31/23 0040    Or    [MAR Hold] HYDROmorphone (Dilaudid) injection 0.5 mg  0.5 mg Q3HRS PRHALI Boateng M.D.   0.5 mg at 08/31/23 0907    [MAR Hold] metoclopramide (Reglan) injection 10 mg  10 mg Q6HRS Elana Boateng M.D.   10 mg at 08/31/23 0814    [MAR Hold] sucralfate (Carafate) 1 GM/10ML suspension 1 g  1 g Q6HRS Elana Boateng M.D.   1 g at 08/30/23 2331    [MAR Hold] ketorolac (Toradol) injection 30 mg  30 mg Q6HRS PRHALI Bernard M.D.   30 mg at 08/31/23 1132    [MAR Hold] famotidine (Pepcid) injection 20 mg  20 mg BID Eder Smalls M.D.   20 mg at 08/31/23 0555    [MAR Hold] senna-docusate (Pericolace Or Senokot S) 8.6-50 MG per tablet 2 Tablet  2 Tablet BID Eder Smalls M.D.   2 Tablet at 08/31/23 0554    And    [MAR Hold] polyethylene glycol/lytes (Miralax) PACKET 1 Packet  1 Packet QDAY PRN Eder Smalls M.D.        And    [MAR Hold] magnesium hydroxide (Milk Of Magnesia) suspension 30 mL  30 mL QDAY PRN Eder Smalls M.D.        And    [MAR Hold] bisacodyl (Dulcolax) suppository 10 mg  10 mg QDAY PRN Eder Smalls M.D.     "    [MAR Hold] ondansetron (Zofran) syringe/vial injection 4 mg  4 mg Q4HRS PRN Eder Smalls M.D.        [MAR Hold] ondansetron (Zofran ODT) dispertab 4 mg  4 mg Q4HRS PRN Eder Smalls M.D.        [MAR Hold] promethazine (Phenergan) tablet 12.5-25 mg  12.5-25 mg Q4HRS PRHALI Smalls M.D.        [MAR Hold] promethazine (Phenergan) suppository 12.5-25 mg  12.5-25 mg Q4HRS PRN Eder Smalls M.D.        [MAR Hold] prochlorperazine (Compazine) injection 5-10 mg  5-10 mg Q4HRS PRHALI Smalls M.D.        [MAR Hold] Pharmacy Consult Request ...Pain Management Review 1 Each  1 Each PHARMACY TO DOSE Eder Smalls M.D.        [Held by provider] HYDROmorphone (Dilaudid) injection 0.5 mg  0.5 mg Q3HRS PRN Eder Smalls M.D.   0.5 mg at 08/28/23 2220    [MAR Hold] lamoTRIgine (LaMICtal) tablet 100 mg  100 mg BID Eder Smalls M.D.   100 mg at 08/31/23 0554   Last reviewed on 8/31/2023  1:39 PM by Vikki Baker R.N.       Vital signs:  Weight/BMI: Body mass index is 28.69 kg/m².  BP (!) 141/74   Pulse 62   Temp 36.6 °C (97.8 °F) (Temporal)   Resp 16   Ht 1.651 m (5' 5\")   Wt 78.2 kg (172 lb 6.4 oz)   SpO2 96%   Vitals:    08/31/23 0230 08/31/23 0345 08/31/23 0800 08/31/23 1346   BP:  125/72 130/79 (!) 141/74   Pulse:  76 82 62   Resp: 17 18 18 16   Temp:  36.4 °C (97.6 °F) 36.7 °C (98 °F) 36.6 °C (97.8 °F)   TempSrc:  Temporal Temporal Temporal   SpO2:  95% 97% 96%   Weight:       Height:         Oxygen Therapy:  Pulse Oximetry: 96 %, O2 (LPM): 0, O2 Delivery Device: None - Room Air  No intake or output data in the 24 hours ending 08/31/23 1425      Physical Exam:  Physical Exam  Well-appearing  Lungs clear  Heart S1-S2  Abdomen soft  Neurologically intact    Labs:  Recent Labs     08/29/23  0024 08/30/23  0014 08/31/23  0204   SODIUM 140 141 138   POTASSIUM 3.8 3.5* 3.5*   CHLORIDE 106 105 107   CO2 24 24 22   BUN 4* 6* 6*   CREATININE 0.47* 0.49* 0.51   MAGNESIUM  --   --  1.6 "   PHOSPHORUS  --   --  3.5   CALCIUM 8.5 8.4* 8.3*     Recent Labs     23  0024 23  0014 23  0204   ALTSGPT 103* 136* 166*   ASTSGOT 39 171* 209*   ALKPHOSPHAT 803* 1058* 859*   TBILIRUBIN 0.4 1.2 2.1*   DBILIRUBIN <0.2  --   --    GLUCOSE 84 95 105*     Recent Labs     23  0024 23  0014 23  0204   WBC 6.6 8.1 6.7   ASTSGOT 39 171* 209*   ALTSGPT 103* 136* 166*   ALKPHOSPHAT 803* 1058* 859*   TBILIRUBIN 0.4 1.2 2.1*     Recent Labs     23  0024 23  0014 23  0204   RBC 3.92* 4.36 3.82*   HEMOGLOBIN 8.3* 9.1* 8.0*   HEMATOCRIT 27.6* 30.1* 26.4*   PLATELETCT 174 187 177     Recent Results (from the past 24 hour(s))   HIV AG/AB COMBO ASSAY SCREENING    Collection Time: 23  3:16 PM   Result Value Ref Range    HIV Ag/Ab Combo Assay Non-Reactive Non Reactive   EKG (IP)    Collection Time: 23  4:16 PM   Result Value Ref Range    Report       [facility]    Test Date:  2023  Pt Name:    ALEN ALONSO         Department:   MRN:        [id number]                      Room:       [facility]  Gender:     Female                       Technician: [facility]  :        1990                   Requested By:IMTIAZ MEEK  Order #:    [id number]                    Reading MD:    Measurements  Intervals                                Axis  Rate:       65                           P:          48  AR:         151                          QRS:        15  QRSD:       103                          T:          23  QT:         400  QTc:        416    Interpretive Statements  Sinus rhythm  Low voltage, precordial leads  No previous ECG available for comparison     CBC WITHOUT DIFFERENTIAL    Collection Time: 23  2:04 AM   Result Value Ref Range    WBC 6.7 4.8 - 10.8 K/uL    RBC 3.82 (L) 4.20 - 5.40 M/uL    Hemoglobin 8.0 (L) 12.0 - 16.0 g/dL    Hematocrit 26.4 (L) 37.0 - 47.0 %    MCV 69.1 (L) 81.4 - 97.8 fL    MCH 20.9 (L) 27.0 - 33.0 pg    MCHC 30.3 (L) 32.2 -  35.5 g/dL    RDW 46.5 35.9 - 50.0 fL    Platelet Count 177 164 - 446 K/uL    MPV 9.7 9.0 - 12.9 fL   Comp Metabolic Panel    Collection Time: 08/31/23  2:04 AM   Result Value Ref Range    Sodium 138 135 - 145 mmol/L    Potassium 3.5 (L) 3.6 - 5.5 mmol/L    Chloride 107 96 - 112 mmol/L    Co2 22 20 - 33 mmol/L    Anion Gap 9.0 7.0 - 16.0    Glucose 105 (H) 65 - 99 mg/dL    Bun 6 (L) 8 - 22 mg/dL    Creatinine 0.51 0.50 - 1.40 mg/dL    Calcium 8.3 (L) 8.5 - 10.5 mg/dL    Correct Calcium 8.7 8.5 - 10.5 mg/dL    AST(SGOT) 209 (H) 12 - 45 U/L    ALT(SGPT) 166 (H) 2 - 50 U/L    Alkaline Phosphatase 859 (H) 30 - 99 U/L    Total Bilirubin 2.1 (H) 0.1 - 1.5 mg/dL    Albumin 3.5 3.2 - 4.9 g/dL    Total Protein 5.8 (L) 6.0 - 8.2 g/dL    Globulin 2.3 1.9 - 3.5 g/dL    A-G Ratio 1.5 g/dL   PHOSPHORUS    Collection Time: 08/31/23  2:04 AM   Result Value Ref Range    Phosphorus 3.5 2.5 - 4.5 mg/dL   MAGNESIUM    Collection Time: 08/31/23  2:04 AM   Result Value Ref Range    Magnesium 1.6 1.5 - 2.5 mg/dL   ESTIMATED GFR    Collection Time: 08/31/23  2:04 AM   Result Value Ref Range    GFR (CKD-EPI) 127 >60 mL/min/1.73 m 2       Radiology Review:  NM-GASTRIC EMPTYING  Narrative: 8/30/2023 12:40 PM    HISTORY/REASON FOR EXAM:  Intractable nausea and vomiting.  Abdominal pain.    TECHNIQUE/EXAM DESCRIPTION AND NUMBER OF VIEWS:  0.552 mCi Tc 99m sulfur colloid was administered orally as a solid.    90 minutes of planar imaging was then performed.    Normal halftime of liquid phase gastric emptying is 10-45 minutes.  Normal halftime of solid phase gastric emptying is  minutes.    COMPARISON: MRI of the abdomen 8/26/2023    FINDINGS:  There was no evidence of gastro-esophageal reflux during 90 minutes of continuous observation.  Gastric emptying appears markedly abnormal without significant passage of radiotracer from the stomach during the 90 minutes of scanning. Gastric emptying   half-time cannot be assessed  Impression:  Significantly delayed gastric emptying.        [facility] (Data Review):   -Records reviewed and summarized in current documentation  -I personally reviewed and interpreted the laboratory results  -I personally reviewed the radiology images  -I have personally reviewed medications      Hospital Problem List:  Active Hospital Problems    Diagnosis     Hypokalemia [E87.6]     Intractable nausea and vomiting [R11.2]     Seizures (HCC) [R56.9]     S/P laparoscopic cholecystectomy [Z90.49]     Iron deficiency anemia [D50.9]     Elevated liver enzymes [R74.8]     Elevated alkaline phosphatase level [R74.8]        Assessment/Recommendations:    33-year-old female with obstructive biochemical liver test pattern and biliary duct dilation with high pretest probability for microlithiasis.  Risks of ERCP reviewed.  Principal risk is failure to benefit.  I cannot promise the patient that we will find anything which will improve her biochemical liver test but microlithiasis would be a solid explanation for her current clinical situation.  Additional risks include pancreatitis, failed failure to cannulate, bowel perforation, bleeding.  Alternatives include continued observation awaiting serologic evaluation for autoimmune process.     After discussing this with the patient she wishes to proceed forward with ERCP.

## 2023-08-31 NOTE — ANESTHESIA PREPROCEDURE EVALUATION
Case: [id number] Date/Time: 08/31/23 1505    Procedure: ERCP (ENDOSCOPIC RETROGRADE CHOLANGIOPANCREATOGRAPHY) (Esophagus)    Anesthesia type: General    Pre-op diagnosis: Abdominal pain, hematemesis     Location: [facility] ROOM 26 / SURGERY SAME DAY [facility]    Surgeons: Bharathi Canseco M.D.      34yo female c h/o seizures on medication    Relevant Problems   NEURO   (positive) Seizures (HCC)       Physical Exam    Airway   Mallampati: I  TM distance: >3 FB  Neck ROM: full       Cardiovascular - normal exam  Rhythm: regular  Rate: normal  (-) murmur     Dental - normal exam           Pulmonary - normal exam  Breath sounds clear to auscultation     Abdominal    Neurological - normal exam                 Anesthesia Plan    ASA 2       Plan - general       Airway plan will be ETT          Induction: intravenous    Postoperative Plan: Postoperative administration of opioids is intended.    Pertinent diagnostic labs and testing reviewed    Informed Consent:    Anesthetic plan and risks discussed with patient.    Use of blood products discussed with: patient whom consented to blood products.

## 2023-08-31 NOTE — CARE PLAN
The patient is Stable - Low risk of patient condition declining or worsening    Shift Goals  Clinical Goals: pain and nausea control  Patient Goals: pain control    Progress made toward(s) clinical / shift goals:  Pain worsened with sucralfate. Pt needed iv dilaudid after oxy not working. Pain controlled at <5/10 after given iv dilaudid    Patient is not progressing towards the following goals:

## 2023-08-31 NOTE — ANESTHESIA PROCEDURE NOTES
Airway    Date/Time: 8/31/2023 2:33 PM    Performed by: Nova Allen M.D.  Authorized by: Nova Allen M.D.    Location:  OR  Urgency:  Elective  Difficult Airway: No    Indications for Airway Management:  Anesthesia      Spontaneous Ventilation: absent    Sedation Level:  Deep  Preoxygenated: Yes    Patient Position:  Sniffing  Mask Difficulty Assessment:  0 - not attempted  Final Airway Type:  Endotracheal airway  Final Endotracheal Airway:  ETT  Cuffed: Yes    Technique Used for Successful ETT Placement:  Direct laryngoscopy    Insertion Site:  Oral  Blade Type:  Silas  Laryngoscope Blade/Videolaryngoscope Blade Size:  3  ETT Size (mm):  7.0  Measured from:  Teeth  ETT to Teeth (cm):  21  Placement Verified by: auscultation and capnometry    Cormack-Lehane Classification:  Grade I - full view of glottis  Number of Attempts at Approach:  1  Number of Other Approaches Attempted:  0

## 2023-08-31 NOTE — OR NURSING
1515- assumed care of pt, pt remains sleeping.  Even unlabored resps noted    1535- pt wake, c/o nausea- medicated per MAR  Denies other needs    1540-Dr Canseco at bedside, medicated per MAR for increasing pain 8/10    1620-report given to JAY JAY Chaudhary  1622-pt tranpsported back to S630, all belongings accounted for

## 2023-08-31 NOTE — PROGRESS NOTES
..Gastroenterology Progress Note               Author:  MARICO Simon Date & Time Created: 8/31/2023 8:11 AM       Patient ID:  Name:             Yenni Nieto    YOB: 1990  Age:                 33 y.o.  female  MRN:               [id number]        Medical Decision Making, by Problem:  Active Hospital Problems    Diagnosis     Hypokalemia [E87.6]     Intractable nausea and vomiting [R11.2]     Seizures (HCC) [R56.9]     S/P laparoscopic cholecystectomy [Z90.49]     Iron deficiency anemia [D50.9]     Elevated liver enzymes [R74.8]     Elevated alkaline phosphatase level [R74.8]            Presenting Chief Complaint:  Abdominal pain, hematemesis     HPI:  This is a pleasant 33-year-old female s/p laparoscopic cholecystectomy 8/19/2023, recent admission 8/25/2023-8/27/2023 who presents back to [facility] on 8/28/2023 for severe right upper quadrant abdominal pain, nausea, vomiting, blood in vomit. No leukocytosis, hemoglobin increased from 8.6 yesterday to 10.3 today.  Platelets normal.  , , alk phos 1110, total bilirubin normal at 0.4, lipase 17, INR 1.05.  US-RUQ showed 3.9 x 2.8 x 2.7 cm fluid collection in the gallbladder fossa could be seroma, hematoma, biloma.  Infection was not included. In regards to her hematemesis and abdominal pain, discussed further evaluation with EGD.     Interval History:  9/1/2023: Patient significantly improved post ERCP. No nausea, tolerating diet, only has mild epigastric pain.   Celiac AB pending  Vit D WNL  Alk phos isoenzymes pending  Dietary consult placed    8/31/2023: ERCP with Dr. Canseco:  IMPRESSION:  #Choledocholithiasis  #Single pigtail plastic pancreatic stent placed  #Single biliary stent 10 Tong[place] by 5 cm placed    8/30/2023: Patient seen after gastric emptying study.  Continues to have significant midepigastric pain which is related to eating 1 to 4 hours post meals.  Also reports diarrhea  yesterday.  Alk phos increased to 1058>803, >39, >103    8/29/2023: EGD with Dr. Escobar:  OPERATIVE FINDINGS:  1. Esophagus: normal  2. Stomach: hiatal hernia, mild erythema in antrum  3. Duodenum: normal duodenum to third portion    Hospital Medications:  Current Facility-Administered Medications   Medication Dose Frequency Provider Last Rate Last Admin    potassium chloride SA (Kdur) tablet 40 mEq  40 mEq Once Elana Boateng M.D.        oxyCODONE immediate-release (Roxicodone) tablet 5 mg  5 mg Q3HRS PRN Elana Boateng M.D.        Or    oxyCODONE immediate release (Roxicodone) tablet 10 mg  10 mg Q3HRS PRN Elana Boateng M.D.   10 mg at 08/31/23 0040    Or    HYDROmorphone (Dilaudid) injection 0.5 mg  0.5 mg Q3HRS PRN Elana Boateng M.D.   0.5 mg at 08/31/23 0554    metoclopramide (Reglan) injection 10 mg  10 mg Q6HRS Elana Boateng M.D.   10 mg at 08/31/23 0207    sucralfate (Carafate) 1 GM/10ML suspension 1 g  1 g Q6HRS Elana Boateng M.D.   1 g at 08/30/23 2331    ketorolac (Toradol) injection 30 mg  30 mg Q6HRS PRHALI Bernard M.D.   30 mg at 08/30/23 1149    famotidine (Pepcid) injection 20 mg  20 mg BID Eder Smalls M.D.   20 mg at 08/31/23 0555    senna-docusate (Pericolace Or Senokot S) 8.6-50 MG per tablet 2 Tablet  2 Tablet BID Eder Smalls M.D.   2 Tablet at 08/31/23 0554    And    polyethylene glycol/lytes (Miralax) PACKET 1 Packet  1 Packet QDAY PRN Eder Smalls M.D.        And    magnesium hydroxide (Milk Of Magnesia) suspension 30 mL  30 mL QDAY PRN Eder Smalls M.D.        And    bisacodyl (Dulcolax) suppository 10 mg  10 mg QDAY PRN Eder Smalls M.D.        ondansetron (Zofran) syringe/vial injection 4 mg  4 mg Q4HRS PRN Eder Smalls M.D.        ondansetron (Zofran ODT) dispertab 4 mg  4 mg Q4HRS PRN Eder Smalls M.D.        promethazine (Phenergan) tablet 12.5-25 mg  12.5-25 mg Q4HRS PRN Eder Smalls M.D.        promethazine (Phenergan) suppository  "12.5-25 mg  12.5-25 mg Q4HRS PRN Eder Smalls M.D.        prochlorperazine (Compazine) injection 5-10 mg  5-10 mg Q4HRS PRN Eder Smalls M.D.        Pharmacy Consult Request ...Pain Management Review 1 Each  1 Each PHARMACY TO DOSE Eder Smalls M.D.        [Held by provider] HYDROmorphone (Dilaudid) injection 0.5 mg  0.5 mg Q3HRS PRN Eder Smalls M.D.   0.5 mg at 08/28/23 2220    lamoTRIgine (LaMICtal) tablet 100 mg  100 mg BID Eder Smalls M.D.   100 mg at 08/31/23 0554   Last reviewed on 8/29/2023 10:11 AM by Jennifer Felix R.N.       Review of Systems:  Review of Systems   Constitutional:  Negative for chills, fever and malaise/fatigue.   HENT:  Negative for hearing loss.    Eyes:  Negative for blurred vision.   Respiratory:  Negative for cough and shortness of breath.    Cardiovascular:  Negative for chest pain and leg swelling.   Gastrointestinal:  Positive for abdominal pain and diarrhea. Negative for blood in stool, constipation, heartburn, melena, nausea and vomiting.   Genitourinary:  Negative for dysuria.   Musculoskeletal:  Negative for back pain.   Skin:  Negative for rash.   Neurological:  Negative for dizziness and weakness.   Psychiatric/Behavioral:  Negative for depression.    All other systems reviewed and are negative.        Vital signs:  Weight/BMI: Body mass index is 28.69 kg/m².  /79   Pulse 82   Temp 36.7 °C (98 °F) (Temporal)   Resp 18   Ht 1.651 m (5' 5\")   Wt 78.2 kg (172 lb 6.4 oz)   SpO2 97%   Vitals:    08/31/23 0040 08/31/23 0230 08/31/23 0345 08/31/23 0800   BP:   125/72 130/79   Pulse:   76 82   Resp: 16 17 18 18   Temp:   36.4 °C (97.6 °F) 36.7 °C (98 °F)   TempSrc:   Temporal Temporal   SpO2:   95% 97%   Weight:       Height:         Oxygen Therapy:  Pulse Oximetry: 97 %, O2 (LPM): 0, O2 Delivery Device: None - Room Air  No intake or output data in the 24 hours ending 08/31/23 0811        Physical Exam:  Physical Exam  Vitals and nursing note " reviewed.   Constitutional:       General: She is not in acute distress.     Appearance: Normal appearance.   HENT:      Head: Normocephalic and atraumatic.      Right Ear: External ear normal.      Left Ear: External ear normal.      Nose: Nose normal.      Mouth/Throat:      Mouth: Mucous membranes are moist.      Pharynx: Oropharynx is clear.   Eyes:      General: No scleral icterus.  Cardiovascular:      Rate and Rhythm: Normal rate and regular rhythm.      Pulses: Normal pulses.      Heart sounds: Normal heart sounds.   Pulmonary:      Effort: Pulmonary effort is normal. No respiratory distress.      Breath sounds: Normal breath sounds.   Abdominal:      General: Abdomen is flat. Bowel sounds are normal. There is no distension.      Palpations: Abdomen is soft.      Tenderness: There is abdominal tenderness.   Musculoskeletal:         General: Normal range of motion.      Cervical back: Normal range of motion.   Skin:     General: Skin is warm and dry.      Capillary Refill: Capillary refill takes less than 2 seconds.   Neurological:      Mental Status: She is alert and oriented to person, place, and time.   Psychiatric:         Mood and Affect: Mood normal.         Behavior: Behavior normal.           Labs:  Recent Labs     08/29/23 0024 08/30/23 0014 08/31/23 0204   SODIUM 140 141 138   POTASSIUM 3.8 3.5* 3.5*   CHLORIDE 106 105 107   CO2 24 24 22   BUN 4* 6* 6*   CREATININE 0.47* 0.49* 0.51   MAGNESIUM  --   --  1.6   PHOSPHORUS  --   --  3.5   CALCIUM 8.5 8.4* 8.3*       Recent Labs     08/29/23 0024 08/30/23 0014 08/31/23 0204   ALTSGPT 103* 136* 166*   ASTSGOT 39 171* 209*   ALKPHOSPHAT 803* 1058* 859*   TBILIRUBIN 0.4 1.2 2.1*   DBILIRUBIN <0.2  --   --    GLUCOSE 84 95 105*       Recent Labs     08/29/23 0024 08/30/23 0014 08/31/23 0204   WBC 6.6 8.1 6.7   ASTSGOT 39 171* 209*   ALTSGPT 103* 136* 166*   ALKPHOSPHAT 803* 1058* 859*   TBILIRUBIN 0.4 1.2 2.1*       Recent Labs     08/29/23 0024  23  0014 23  0204   RBC 3.92* 4.36 3.82*   HEMOGLOBIN 8.3* 9.1* 8.0*   HEMATOCRIT 27.6* 30.1* 26.4*   PLATELETCT 174 187 177       Recent Results (from the past 24 hour(s))   HIV AG/AB COMBO ASSAY SCREENING    Collection Time: 23  3:16 PM   Result Value Ref Range    HIV Ag/Ab Combo Assay Non-Reactive Non Reactive   EKG (IP)    Collection Time: 23  4:16 PM   Result Value Ref Range    Report       [facility]    Test Date:  2023  Pt Name:    ALEN ALONSO         Department:   MRN:        [id number]                      Room:       S630  Gender:     Female                       Technician: [facility]  :        1990                   Requested By:IMTIAZ MEKE  Order #:    [id number]                    Reading MD:    Measurements  Intervals                                Axis  Rate:       65                           P:          48  TX:         151                          QRS:        15  QRSD:       103                          T:          23  QT:         400  QTc:        416    Interpretive Statements  Sinus rhythm  Low voltage, precordial leads  No previous ECG available for comparison     CBC WITHOUT DIFFERENTIAL    Collection Time: 23  2:04 AM   Result Value Ref Range    WBC 6.7 4.8 - 10.8 K/uL    RBC 3.82 (L) 4.20 - 5.40 M/uL    Hemoglobin 8.0 (L) 12.0 - 16.0 g/dL    Hematocrit 26.4 (L) 37.0 - 47.0 %    MCV 69.1 (L) 81.4 - 97.8 fL    MCH 20.9 (L) 27.0 - 33.0 pg    MCHC 30.3 (L) 32.2 - 35.5 g/dL    RDW 46.5 35.9 - 50.0 fL    Platelet Count 177 164 - 446 K/uL    MPV 9.7 9.0 - 12.9 fL   Comp Metabolic Panel    Collection Time: 23  2:04 AM   Result Value Ref Range    Sodium 138 135 - 145 mmol/L    Potassium 3.5 (L) 3.6 - 5.5 mmol/L    Chloride 107 96 - 112 mmol/L    Co2 22 20 - 33 mmol/L    Anion Gap 9.0 7.0 - 16.0    Glucose 105 (H) 65 - 99 mg/dL    Bun 6 (L) 8 - 22 mg/dL    Creatinine 0.51 0.50 - 1.40 mg/dL    Calcium 8.3 (L) 8.5 - 10.5 mg/dL    Correct Calcium  8.7 8.5 - 10.5 mg/dL    AST(SGOT) 209 (H) 12 - 45 U/L    ALT(SGPT) 166 (H) 2 - 50 U/L    Alkaline Phosphatase 859 (H) 30 - 99 U/L    Total Bilirubin 2.1 (H) 0.1 - 1.5 mg/dL    Albumin 3.5 3.2 - 4.9 g/dL    Total Protein 5.8 (L) 6.0 - 8.2 g/dL    Globulin 2.3 1.9 - 3.5 g/dL    A-G Ratio 1.5 g/dL   PHOSPHORUS    Collection Time: 08/31/23  2:04 AM   Result Value Ref Range    Phosphorus 3.5 2.5 - 4.5 mg/dL   MAGNESIUM    Collection Time: 08/31/23  2:04 AM   Result Value Ref Range    Magnesium 1.6 1.5 - 2.5 mg/dL   ESTIMATED GFR    Collection Time: 08/31/23  2:04 AM   Result Value Ref Range    GFR (CKD-EPI) 127 >60 mL/min/1.73 m 2       Radiology Review:  NM-GASTRIC EMPTYING   Final Result      Significantly delayed gastric emptying.         US-RUQ   Final Result      1.  Interval cholecystectomy.   2.  3.9 by 2.8 x 2.7 cm fluid collection in the gallbladder fossa could be a seroma, hematoma or biloma. Infection is not excluded.      DX-CHEST-PORTABLE (1 VIEW)   Final Result      No acute cardiopulmonary abnormality.            MDM (Data Review):   -Records reviewed and summarized in current documentation  -I personally reviewed and interpreted the laboratory results  -I personally reviewed the radiology images    Assessment/Recommendations:  Impressions:  Abdominal pain s/p cholecystectomy-fluid collection in gallbladder fossa  Transaminitis-total bilirubin normal.  Elevated alkaline phosphatase   GODWIN r/o on US  Vitamin D WNL  HIV negative    RECOMMENDATIONS:  EGD normal, gastric emptying study with delayed emptying, dietary consult placed. Needs to eat low fat and low fiber with small frequent meals.  Can continue PPI   Isoenzymes of alk phos and celiac pending  Stent exchange in 4-8 weeks with Dr. Canseco, [facility] GI team will call with appointment  Monitor LFTs, anticipate discharge in am    Plan of care discussed with patient, Dr. Boateng and Dr. Canseco    Core Quality Measures   Reviewed items::  Labs, Medications  and Radiology reports reviewed

## 2023-09-01 PROBLEM — K80.50 CHOLEDOCHOLITHIASIS: Status: ACTIVE | Noted: 2023-09-01

## 2023-09-01 LAB
ALBUMIN SERPL BCP-MCNC: 3.7 G/DL (ref 3.2–4.9)
ALBUMIN/GLOB SERPL: 1.4 G/DL
ALP SERPL-CCNC: 892 U/L (ref 30–99)
ALT SERPL-CCNC: 183 U/L (ref 2–50)
ANION GAP SERPL CALC-SCNC: 13 MMOL/L (ref 7–16)
AST SERPL-CCNC: 110 U/L (ref 12–45)
BASOPHILS # BLD AUTO: 0.2 % (ref 0–1.8)
BASOPHILS # BLD: 0.02 K/UL (ref 0–0.12)
BILIRUB SERPL-MCNC: 1 MG/DL (ref 0.1–1.5)
BUN SERPL-MCNC: 8 MG/DL (ref 8–22)
CALCIUM ALBUM COR SERPL-MCNC: 9 MG/DL (ref 8.5–10.5)
CALCIUM SERPL-MCNC: 8.8 MG/DL (ref 8.5–10.5)
CHLORIDE SERPL-SCNC: 103 MMOL/L (ref 96–112)
CO2 SERPL-SCNC: 21 MMOL/L (ref 20–33)
CREAT SERPL-MCNC: 0.4 MG/DL (ref 0.5–1.4)
EOSINOPHIL # BLD AUTO: 0.01 K/UL (ref 0–0.51)
EOSINOPHIL NFR BLD: 0.1 % (ref 0–6.9)
ERYTHROCYTE [DISTWIDTH] IN BLOOD BY AUTOMATED COUNT: 46.5 FL (ref 35.9–50)
GFR SERPLBLD CREATININE-BSD FMLA CKD-EPI: 135 ML/MIN/1.73 M 2
GLOBULIN SER CALC-MCNC: 2.7 G/DL (ref 1.9–3.5)
GLUCOSE SERPL-MCNC: 88 MG/DL (ref 65–99)
HCT VFR BLD AUTO: 29.4 % (ref 37–47)
HGB BLD-MCNC: 8.9 G/DL (ref 12–16)
IMM GRANULOCYTES # BLD AUTO: 0.03 K/UL (ref 0–0.11)
IMM GRANULOCYTES NFR BLD AUTO: 0.3 % (ref 0–0.9)
LYMPHOCYTES # BLD AUTO: 1 K/UL (ref 1–4.8)
LYMPHOCYTES NFR BLD: 10.6 % (ref 22–41)
MCH RBC QN AUTO: 20.9 PG (ref 27–33)
MCHC RBC AUTO-ENTMCNC: 30.3 G/DL (ref 32.2–35.5)
MCV RBC AUTO: 69 FL (ref 81.4–97.8)
MONOCYTES # BLD AUTO: 0.6 K/UL (ref 0–0.85)
MONOCYTES NFR BLD AUTO: 6.4 % (ref 0–13.4)
NEUTROPHILS # BLD AUTO: 7.77 K/UL (ref 1.82–7.42)
NEUTROPHILS NFR BLD: 82.4 % (ref 44–72)
NRBC # BLD AUTO: 0 K/UL
NRBC BLD-RTO: 0 /100 WBC (ref 0–0.2)
PLATELET # BLD AUTO: 179 K/UL (ref 164–446)
PMV BLD AUTO: 9.3 FL (ref 9–12.9)
POTASSIUM SERPL-SCNC: 3.8 MMOL/L (ref 3.6–5.5)
PROT SERPL-MCNC: 6.4 G/DL (ref 6–8.2)
RBC # BLD AUTO: 4.26 M/UL (ref 4.2–5.4)
SODIUM SERPL-SCNC: 137 MMOL/L (ref 135–145)
WBC # BLD AUTO: 9.4 K/UL (ref 4.8–10.8)

## 2023-09-01 PROCEDURE — 82784 ASSAY IGA/IGD/IGG/IGM EACH: CPT

## 2023-09-01 PROCEDURE — 99232 SBSQ HOSP IP/OBS MODERATE 35: CPT | Performed by: NURSE PRACTITIONER

## 2023-09-01 PROCEDURE — A9270 NON-COVERED ITEM OR SERVICE: HCPCS | Performed by: HOSPITALIST

## 2023-09-01 PROCEDURE — 80053 COMPREHEN METABOLIC PANEL: CPT

## 2023-09-01 PROCEDURE — 700111 HCHG RX REV CODE 636 W/ 250 OVERRIDE (IP): Performed by: STUDENT IN AN ORGANIZED HEALTH CARE EDUCATION/TRAINING PROGRAM

## 2023-09-01 PROCEDURE — 700102 HCHG RX REV CODE 250 W/ 637 OVERRIDE(OP): Performed by: HOSPITALIST

## 2023-09-01 PROCEDURE — 85025 COMPLETE CBC W/AUTO DIFF WBC: CPT

## 2023-09-01 PROCEDURE — 99232 SBSQ HOSP IP/OBS MODERATE 35: CPT | Performed by: STUDENT IN AN ORGANIZED HEALTH CARE EDUCATION/TRAINING PROGRAM

## 2023-09-01 PROCEDURE — 86364 TISS TRNSGLTMNASE EA IG CLAS: CPT

## 2023-09-01 PROCEDURE — 770006 HCHG ROOM/CARE - MED/SURG/GYN SEMI*

## 2023-09-01 PROCEDURE — 700111 HCHG RX REV CODE 636 W/ 250 OVERRIDE (IP): Mod: JZ | Performed by: HOSPITALIST

## 2023-09-01 RX ADMIN — LAMOTRIGINE 100 MG: 100 TABLET ORAL at 16:22

## 2023-09-01 RX ADMIN — SENNOSIDES AND DOCUSATE SODIUM 2 TABLET: 50; 8.6 TABLET ORAL at 04:57

## 2023-09-01 RX ADMIN — FAMOTIDINE 20 MG: 10 INJECTION INTRAVENOUS at 16:22

## 2023-09-01 RX ADMIN — HYDROMORPHONE HYDROCHLORIDE 0.5 MG: 1 INJECTION, SOLUTION INTRAMUSCULAR; INTRAVENOUS; SUBCUTANEOUS at 03:20

## 2023-09-01 RX ADMIN — LAMOTRIGINE 100 MG: 100 TABLET ORAL at 04:57

## 2023-09-01 RX ADMIN — METOCLOPRAMIDE 10 MG: 5 INJECTION, SOLUTION INTRAMUSCULAR; INTRAVENOUS at 03:20

## 2023-09-01 RX ADMIN — FAMOTIDINE 20 MG: 10 INJECTION INTRAVENOUS at 04:57

## 2023-09-01 RX ADMIN — METOCLOPRAMIDE 10 MG: 5 INJECTION, SOLUTION INTRAMUSCULAR; INTRAVENOUS at 10:07

## 2023-09-01 ASSESSMENT — ENCOUNTER SYMPTOMS
ABDOMINAL PAIN: 1
NAUSEA: 1
VOMITING: 1

## 2023-09-01 ASSESSMENT — PAIN DESCRIPTION - PAIN TYPE: TYPE: ACUTE PAIN

## 2023-09-01 NOTE — PROGRESS NOTES
[facility] Medicine Daily Progress Note    Date of Service  9/1/2023    Chief Complaint  Vomiting    Hospital Course  Yenni Nieto is a 33 y.o. female with history of seizures, and recent cholecystectomy 8/19, who presented 8/28/2023 with intractable pain after eating. She states that about 1 hour after eating she has very sharp pain in the epigastric area . Patient was recently admitted on 8/25-8/27 for the same complaint. MRCP was done on last admission and noted mildly dilated CBD at 10mm, with no choledocholithiasis with redemonstration of collection in the gallbladder fossa similar to prior which was felt to be postop seroma, hematoma or biloma. HIDA scan showed no evidence of leak.  At that time, she was felt to have passed a biliary duct stone and was discharged on 8/27. However She continues to feel nauseous and vomiting and cannot keep anything down.      Here right upper quadrant ultrasound showed interval cholecystectomy with again noted 3.9 x 2.8 x 2.7 cm fluid collection in the gallbladder fossa.  LFTs elevated, with ALT of 150, AST of 104, alkaline phosphatase of 1110, with normal bilirubin and lipase.      GI was consulted, patient underwent EGD 8/29 with hiatal hernia, unremarkable finding.   Gastric emptying study: Significantly delayed gastric emptying  ERCP 8/31 noted stone was impacted at the ampulla. Treated with biliary sphincterotomy, calculi removal, pancreatic duct stent placement, biliary duct stent placement    Interval Problem Update  -Notes were reviewed from GI, prior records, radiology, nursing etc.  -Reviewed labs to date, microbiology for current admit and prior  -Imaging was independently reviewed and interpreted    S/p ERCP 8/31 noted stone was impacted at the ampulla. Treated with biliary sphincterotomy, calculi removal, pancreatic duct stent placement, biliary duct stent placement.  Abdominal pain significant improving  Soft diet  Monitoring  LFT    Consultants/Specialty  GI    Code Status  Full Code    Disposition  Medically Cleared  Anticipate discharge to home once medically cleared.  I have placed the appropriate orders for post-discharge needs.    Review of Systems  Review of Systems   Gastrointestinal:  Positive for abdominal pain, nausea and vomiting.   All other systems reviewed and are negative.       Pertinent positives/negatives as mentioned above.     A complete review of systems was personally done by me. All other systems were negative.       Physical Exam  Temp:  [36.2 °C (97.2 °F)-37.1 °C (98.7 °F)] 36.2 °C (97.2 °F)  Pulse:  [61-72] 61  Resp:  [14-18] 16  BP: (105-128)/(66-76) 110/67  SpO2:  [91 %-97 %] 95 %    Physical Exam  Vitals and nursing note reviewed.   Constitutional:       General: She is not in acute distress.     Appearance: Normal appearance. She is normal weight. She is not ill-appearing or diaphoretic.   HENT:      Head: Normocephalic and atraumatic.      Right Ear: External ear normal.      Left Ear: External ear normal.      Mouth/Throat:      Mouth: Mucous membranes are moist.      Pharynx: No oropharyngeal exudate or posterior oropharyngeal erythema.   Eyes:      General: No scleral icterus.     Extraocular Movements: Extraocular movements intact.      Conjunctiva/sclera: Conjunctivae normal.      Pupils: Pupils are equal, round, and reactive to light.   Cardiovascular:      Rate and Rhythm: Normal rate and regular rhythm.      Heart sounds: Normal heart sounds. No murmur heard.  Pulmonary:      Effort: Pulmonary effort is normal. No respiratory distress.      Breath sounds: Normal breath sounds. No stridor. No wheezing, rhonchi or rales.   Chest:      Chest wall: No tenderness.   Abdominal:      General: Bowel sounds are normal. There is no distension.      Palpations: Abdomen is soft. There is no mass.      Tenderness: There is abdominal tenderness (Epigastric). There is no guarding or rebound.      Comments:  Surgical scar from recent cholecystectomy noted    Musculoskeletal:         General: No swelling. Normal range of motion.      Cervical back: Normal range of motion and neck supple. No rigidity. No muscular tenderness.      Right lower leg: No edema.      Left lower leg: No edema.   Lymphadenopathy:      Cervical: No cervical adenopathy.   Skin:     General: Skin is warm and dry.      Coloration: Skin is not jaundiced.      Findings: No rash.   Neurological:      General: No focal deficit present.      Mental Status: She is alert and oriented to person, place, and time. Mental status is at baseline.      Cranial Nerves: No cranial nerve deficit.   Psychiatric:         Mood and Affect: Mood normal.         Behavior: Behavior normal.         Thought Content: Thought content normal.         Judgment: Judgment normal.         Fluids    Intake/Output Summary (Last 24 hours) at 9/1/2023 1557  Last data filed at 9/1/2023 1000  Gross per 24 hour   Intake 120 ml   Output --   Net 120 ml         Laboratory  Recent Labs     08/30/23  0014 08/31/23  0204 09/01/23  0131   WBC 8.1 6.7 9.4   RBC 4.36 3.82* 4.26   HEMOGLOBIN 9.1* 8.0* 8.9*   HEMATOCRIT 30.1* 26.4* 29.4*   MCV 69.0* 69.1* 69.0*   MCH 20.9* 20.9* 20.9*   MCHC 30.2* 30.3* 30.3*   RDW 45.8 46.5 46.5   PLATELETCT 187 177 179   MPV 9.8 9.7 9.3       Recent Labs     08/30/23  0014 08/31/23  0204 09/01/23  0131   SODIUM 141 138 137   POTASSIUM 3.5* 3.5* 3.8   CHLORIDE 105 107 103   CO2 24 22 21   GLUCOSE 95 105* 88   BUN 6* 6* 8   CREATININE 0.49* 0.51 0.40*   CALCIUM 8.4* 8.3* 8.8                       Imaging  DX-PORTABLE FLUORO > 1 HOUR   Final Result      Portable fluoroscopy utilized for 0.5 seconds.         INTERPRETING LOCATION: [address] [place] [address], [address]      NM-GASTRIC EMPTYING   Final Result      Significantly delayed gastric emptying.         US-RUQ   Final Result      1.  Interval cholecystectomy.   2.  3.9 by 2.8 x 2.7 cm fluid collection in the gallbladder  fossa could be a seroma, hematoma or biloma. Infection is not excluded.      DX-CHEST-PORTABLE (1 VIEW)   Final Result      No acute cardiopulmonary abnormality.             Assessment/Plan  * Choledocholithiasis  Assessment & Plan  S/p ERCP 8/31 noted stone was impacted at the ampulla. Treated with biliary sphincterotomy, calculi removal, pancreatic duct stent placement, biliary duct stent placement  Advance diet as tolerated  Antiemetics  Monitoring LFT  Repeat ERCP in 4 to 8 weeks for stent removal    Hypokalemia- (present on admission)  Assessment & Plan  - Replaced.  Potassium has normalized.  Continue to trend.  BMP in the morning.    S/P laparoscopic cholecystectomy- (present on admission)  Assessment & Plan  S/p cholecystectomy 8/19  Right upper quadrant ultrasound showed interval cholecystectomy with again noted 3.9 x 2.8 x 2.7 cm fluid collection in the gallbladder fossa      Seizures (HCC)- (present on admission)  Assessment & Plan  -Continue home Lamictal.      Intractable nausea and vomiting- (present on admission)  Assessment & Plan  Likely sec to choledocholithiasis   ERCP 8/31 noted stone was impacted at the ampulla. Treated with biliary sphincterotomy, calculi removal, pancreatic duct stent placement, biliary duct stent placement.  Gastric empty study: delayed. Likely due to severe abdominal pain  Antiemetics         Iron deficiency anemia- (present on admission)  Assessment & Plan  -Replace po once able to tolerate    Elevated liver enzymes- (present on admission)  Assessment & Plan  Due to choledocholithiasis.   S/p ERCP 8/31 noted stone was impacted at the ampulla. Treated with biliary sphincterotomy, calculi removal, pancreatic duct stent placement, biliary duct stent placement.  Repeat ERCP in 4 to 8 weeks for stent removal    Elevated alkaline phosphatase level- (present on admission)  Assessment & Plan  Persistently elevated   Iso enzyme pending  Due to choledocholelithiasis          VTE  prophylaxis:   SCDs/TEDs

## 2023-09-01 NOTE — CARE PLAN
The patient is Stable - Low risk of patient condition declining or worsening    Shift Goals  Clinical Goals: pain mgt  Patient Goals: pain mgt  Family Goals: none present    Progress made toward(s) clinical / shift goals:  Patient has to this point been pain free since her procedure early in the day. Will advance her diet in the morning per physician order.     Patient is not progressing towards the following goals:

## 2023-09-01 NOTE — ASSESSMENT & PLAN NOTE
S/p ERCP 8/31 noted stone was impacted at the ampulla. Treated with biliary sphincterotomy, calculi removal, pancreatic duct stent placement, biliary duct stent placement  Advance diet as tolerated  Antiemetics  Monitoring LFT  Repeat ERCP in 4 to 8 weeks for stent removal

## 2023-09-02 VITALS
TEMPERATURE: 98.3 F | DIASTOLIC BLOOD PRESSURE: 50 MMHG | WEIGHT: 172.4 LBS | BODY MASS INDEX: 28.72 KG/M2 | SYSTOLIC BLOOD PRESSURE: 104 MMHG | HEIGHT: 65 IN | HEART RATE: 64 BPM | RESPIRATION RATE: 16 BRPM | OXYGEN SATURATION: 98 %

## 2023-09-02 LAB
ALBUMIN SERPL BCP-MCNC: 3.6 G/DL (ref 3.2–4.9)
ALBUMIN/GLOB SERPL: 1.6 G/DL
ALP BONE SERPL-CCNC: 201 U/L (ref 0–55)
ALP ISOS SERPL HS-CCNC: 0 U/L
ALP LIVER SERPL-CCNC: 806 U/L (ref 0–94)
ALP SERPL-CCNC: 1007 U/L (ref 40–120)
ALP SERPL-CCNC: 700 U/L (ref 30–99)
ALT SERPL-CCNC: 119 U/L (ref 2–50)
ANION GAP SERPL CALC-SCNC: 11 MMOL/L (ref 7–16)
AST SERPL-CCNC: 36 U/L (ref 12–45)
BILIRUB SERPL-MCNC: 0.6 MG/DL (ref 0.1–1.5)
BUN SERPL-MCNC: 11 MG/DL (ref 8–22)
CALCIUM ALBUM COR SERPL-MCNC: 8.8 MG/DL (ref 8.5–10.5)
CALCIUM SERPL-MCNC: 8.5 MG/DL (ref 8.5–10.5)
CHLORIDE SERPL-SCNC: 104 MMOL/L (ref 96–112)
CO2 SERPL-SCNC: 24 MMOL/L (ref 20–33)
CREAT SERPL-MCNC: 0.53 MG/DL (ref 0.5–1.4)
ERYTHROCYTE [DISTWIDTH] IN BLOOD BY AUTOMATED COUNT: 46.2 FL (ref 35.9–50)
GFR SERPLBLD CREATININE-BSD FMLA CKD-EPI: 126 ML/MIN/1.73 M 2
GLOBULIN SER CALC-MCNC: 2.3 G/DL (ref 1.9–3.5)
GLUCOSE SERPL-MCNC: 98 MG/DL (ref 65–99)
HCT VFR BLD AUTO: 27.5 % (ref 37–47)
HGB BLD-MCNC: 8.5 G/DL (ref 12–16)
IGA SERPL-MCNC: 235 MG/DL (ref 68–408)
MAGNESIUM SERPL-MCNC: 1.8 MG/DL (ref 1.5–2.5)
MCH RBC QN AUTO: 21.1 PG (ref 27–33)
MCHC RBC AUTO-ENTMCNC: 30.9 G/DL (ref 32.2–35.5)
MCV RBC AUTO: 68.4 FL (ref 81.4–97.8)
PHOSPHATE SERPL-MCNC: 3.6 MG/DL (ref 2.5–4.5)
PLATELET # BLD AUTO: 211 K/UL (ref 164–446)
PMV BLD AUTO: 9.8 FL (ref 9–12.9)
POTASSIUM SERPL-SCNC: 3.1 MMOL/L (ref 3.6–5.5)
PROT SERPL-MCNC: 5.9 G/DL (ref 6–8.2)
RBC # BLD AUTO: 4.02 M/UL (ref 4.2–5.4)
SODIUM SERPL-SCNC: 139 MMOL/L (ref 135–145)
WBC # BLD AUTO: 8.1 K/UL (ref 4.8–10.8)

## 2023-09-02 PROCEDURE — A9270 NON-COVERED ITEM OR SERVICE: HCPCS | Performed by: HOSPITALIST

## 2023-09-02 PROCEDURE — 99239 HOSP IP/OBS DSCHRG MGMT >30: CPT | Performed by: STUDENT IN AN ORGANIZED HEALTH CARE EDUCATION/TRAINING PROGRAM

## 2023-09-02 PROCEDURE — 700111 HCHG RX REV CODE 636 W/ 250 OVERRIDE (IP): Mod: JZ | Performed by: HOSPITALIST

## 2023-09-02 PROCEDURE — 700102 HCHG RX REV CODE 250 W/ 637 OVERRIDE(OP): Performed by: HOSPITALIST

## 2023-09-02 PROCEDURE — 84100 ASSAY OF PHOSPHORUS: CPT

## 2023-09-02 PROCEDURE — 83735 ASSAY OF MAGNESIUM: CPT

## 2023-09-02 PROCEDURE — 80053 COMPREHEN METABOLIC PANEL: CPT

## 2023-09-02 PROCEDURE — 85027 COMPLETE CBC AUTOMATED: CPT

## 2023-09-02 RX ORDER — POTASSIUM CHLORIDE 20 MEQ/1
40 TABLET, EXTENDED RELEASE ORAL DAILY
Qty: 4 TABLET | Refills: 0 | Status: SHIPPED | OUTPATIENT
Start: 2023-09-02 | End: 2023-09-04

## 2023-09-02 RX ADMIN — LAMOTRIGINE 100 MG: 100 TABLET ORAL at 05:34

## 2023-09-02 RX ADMIN — FAMOTIDINE 20 MG: 10 INJECTION INTRAVENOUS at 05:34

## 2023-09-02 RX ADMIN — SENNOSIDES AND DOCUSATE SODIUM 2 TABLET: 50; 8.6 TABLET ORAL at 05:34

## 2023-09-02 ASSESSMENT — ENCOUNTER SYMPTOMS
BACK PAIN: 0
DIARRHEA: 1
BLOOD IN STOOL: 0
CHILLS: 0
BLURRED VISION: 0
SHORTNESS OF BREATH: 0
HEARTBURN: 0
WEAKNESS: 0
VOMITING: 0
ABDOMINAL PAIN: 1
CONSTIPATION: 0
DIZZINESS: 0
COUGH: 0
NAUSEA: 0
FEVER: 0
DEPRESSION: 0

## 2023-09-02 NOTE — PROGRESS NOTES
Pt AXOX4. Vitals stable  Ambulates with steady gait, independent  Pt taken by wheelchair to [facility] for discharge

## 2023-09-02 NOTE — CARE PLAN
The patient is Stable - Low risk of patient condition declining or worsening    Shift Goals  Clinical Goals: advance diet  Patient Goals: pain mgt  Family Goals: none present    Progress made toward(s) clinical / shift goals:  patient diet advanced to low fiber diet. Patient pain was tolerable for the shift    Problem: Pain - Standard  Goal: Alleviation of pain or a reduction in pain to the patient’s comfort goal  Outcome: Progressing     Problem: Knowledge Deficit - Standard  Goal: Patient and family/care givers will demonstrate understanding of plan of care, disease process/condition, diagnostic tests and medications  Outcome: Progressing     Problem: Pain - Post Surgery  Goal: Alleviation or reduction of pain post surgery  Outcome: Progressing     Problem: Wound/ / Incision Healing  Goal: Patient's wound/surgical incision will decrease in size and heals properly  Outcome: Progressing

## 2023-09-02 NOTE — DISCHARGE INSTRUCTIONS
Discharge Instructions per Elana Boateng M.D.    Please follow-up with PCP as outpatient.  Stent exchange in 4-8 weeks with Dr. Canseco, [facility] team will call with appointment      Return to ER in the event of new or worsening symptoms. Please note importance of compliance and the patient has agreed to proceed with all medical recommendations and follow up plan indicated above. All medications come with benefits and risks. Risks may include permanent injury or death and these risks can be minimized with close reassessment and monitoring. Please make it to your scheduled follow ups with PCP and GI    Nausea and Vomiting, Adult  Nausea is feeling that you have an upset stomach and that you are about to vomit. Vomiting is when food in your stomach forcefully comes out of your mouth. Vomiting can make you feel weak. If you vomit, or if you are not able to drink enough fluids, you may not have enough water in your body (get dehydrated). If you do not have enough water in your body, you may:  Feel tired.  Feel thirsty.  Have a dry mouth.  Have cracked lips.  Pee (urinate) less often.  Older adults and people with other diseases or a weak body defense system (immune system) are at higher risk for not having enough water in the body. If you feel like you may vomit or you vomit, it is important to follow instructions from your doctor about how to take care of yourself.  Follow these instructions at home:  Watch your symptoms for any changes. Tell your doctor about them.  Eating and drinking         Take an ORS (oral rehydration solution). This is a drink that is sold at pharmacies and stores.  Drink clear fluids in small amounts as you are able, such as:  Water.  Ice chips.  Fruit juice that has water added (diluted fruit juice).  Low-calorie sports drinks.  Eat bland, easy-to-digest foods in small amounts as you are able, such as:  Bananas.  Applesauce.  Rice.  Low-fat (lean) meats.  Toast.  Crackers.  Avoid drinking  fluids that have a lot of sugar or caffeine in them. This includes energy drinks, sports drinks, and soda.  Avoid alcohol.  Avoid spicy or fatty foods.  General instructions  Take over-the-counter and prescription medicines only as told by your doctor.  Drink enough fluid to keep your pee (urine) pale yellow.  Wash your hands often with soap and water for at least 20 seconds. If you cannot use soap and water, use hand .  Make sure that everyone in your home washes their hands well and often.  Rest at home until you feel better.  Watch your condition for any changes.  Take slow and deep breaths when you feel like you may vomit.  Keep all follow-up visits.  Contact a doctor if:  Your symptoms get worse.  You have new symptoms.  You have a fever.  You cannot drink fluids without vomiting.  You feel like you may vomit for more than 2 days.  You feel light-headed or dizzy.  You have a headache.  You have muscle cramps.  You have a rash.  You have pain while peeing.  Get help right away if:  You have pain in your chest, neck, arm, or jaw.  You feel very weak or you faint.  You vomit again and again.  You have vomit that is bright red or looks like black coffee grounds.  You have bloody or black poop (stools) or poop that looks like tar.  You have a very bad headache, a stiff neck, or both.  You have very bad pain, cramping, or bloating in your belly (abdomen).  You have trouble breathing.  You are breathing very quickly.  Your heart is beating very quickly.  Your skin feels cold and clammy.  You feel confused.  You have signs of losing too much water in your body, such as:  Dark pee, very little pee, or no pee.  Cracked lips.  Dry mouth.  Sunken eyes.  Sleepiness.  Weakness.  These symptoms may be an emergency. Get help right away. Call 911.  Do not wait to see if the symptoms will go away.  Do not drive yourself to the hospital.  Summary  Nausea is feeling that you have an upset stomach and that you are about to  vomit. Vomiting is when food in your stomach comes out of your mouth.  Follow instructions from your doctor about eating and drinking.  Take over-the-counter and prescription medicines only as told by your doctor.  Contact your doctor if your symptoms get worse or you have new symptoms.  Keep all follow-up visits.  This information is not intended to replace advice given to you by your health care provider. Make sure you discuss any questions you have with your health care provider.  Document Revised: 06/24/2022 Document Reviewed: 06/24/2022  Elsevier Patient Education © 2023 [employer] Inc[employer]

## 2023-09-02 NOTE — DISCHARGE SUMMARY
Discharge Summary    CHIEF COMPLAINT ON ADMISSION  Chief Complaint   Patient presents with    Blood in Vomit     Reports emesis x 1800 yesterday x 1 episode no blood. Blood in emesis x 1 at 0530. Reports blood: dark brown and bright red, pt reports amount of blood as large. Pt DC home from [facility] yesterday, dx of abd pain. S/p sx procedure; removal of gallbladder. Unable to tolerate PO liquid/solids since DC from Hospital.       Reason for Admission  Blood in Vomit     Admission Date  8/28/2023    CODE STATUS  Full Code    HPI & HOSPITAL COURSE  This is a 33 y.o. female with history of seizures, and recent cholecystectomy 8/19, who presented 8/28/2023 with intractable pain after eating. Patient was recently admitted on 8/25-8/27 for the same complaint. MRCP was done on last admission and noted mildly dilated CBD at 10mm, with no choledocholithiasis with redemonstration of collection in the gallbladder fossa similar to prior which was felt to be postop seroma, hematoma or biloma. HIDA scan showed no evidence of leak.  At that time, she was felt to have passed a biliary duct stone and was discharged on 8/27. However She continues to feel nauseous and vomiting and cannot keep anything down and was readmitted.      Here right upper quadrant ultrasound showed interval cholecystectomy with again noted 3.9 x 2.8 x 2.7 cm fluid collection in the gallbladder fossa.  LFTs elevated, with ALT of 150, AST of 104, alkaline phosphatase of 1110. GI was consulted, patient underwent EGD 8/29 with hiatal hernia, unremarkable finding. She underwent ERCP on 8/31 noted stone was impacted at the ampulla. Treated with biliary sphincterotomy, calculi removal, pancreatic duct stent placement, biliary duct stent placement.  Patient's nausea, vomiting and abdominal pain significantly improved after stone removal. LFT improving. She is tolerating diet. GI recommends Stent exchange in 4-8 weeks with Dr. Canseco. [facility] team will call  "with appointment.     Therefore, she is discharged in good and stable condition to home with close outpatient follow-up.    The patient met 2-midnight criteria for an inpatient stay at the time of discharge.    Discharge Date  9/2/23    FOLLOW UP ITEMS POST DISCHARGE  PCP  GI    DISCHARGE DIAGNOSES  Principal Problem:    Choledocholithiasis (POA: Unknown)  Active Problems:    Elevated alkaline phosphatase level (POA: Yes)    Elevated liver enzymes (POA: Yes)    Iron deficiency anemia (POA: Yes)    Intractable nausea and vomiting (POA: Yes)    Seizures (HCC) (POA: Yes)    S/P laparoscopic cholecystectomy (POA: Yes)    Hypokalemia (POA: Yes)  Resolved Problems:    * No resolved hospital problems. *      FOLLOW UP  No future appointments.  Bharathi Canseco M.D.  [address] [place] [id number]  [phone]    Follow up in 4 week(s)        MEDICATIONS ON DISCHARGE     Medication List        START taking these medications        Instructions   potassium chloride SA 20 MEQ Tbcr  Commonly known as: Kdur   Take 2 Tablets by mouth every day for 2 days.  Dose: 40 mEq            CONTINUE taking these medications        Instructions   Advil 200 MG Tabs  Generic drug: ibuprofen   Take 400 mg by mouth every 6 hours as needed for Mild Pain.  Dose: 400 mg     FeroSul 325 (65 Fe) MG tablet  Generic drug: ferrous sulfate   Take 1 Tablet by mouth every morning with breakfast for 30 days.  Dose: 325 mg     lamoTRIgine 100 MG Tabs  Commonly known as: LaMICtal   Take 100 mg by mouth 2 times a day.  Dose: 100 mg            STOP taking these medications      oxyCODONE immediate release 10 MG immediate release tablet  Commonly known as: Roxicodone              Allergies  Allergies   Allergen Reactions    Keppra [Levetiracetam] Unspecified     \"Increased seizures\" per patient       DIET  Orders Placed This Encounter   Procedures    Diet Order Diet: Low Fiber(GI Soft); Nutrient modifications: (optional): Low Fat     Standing " Status:   Standing     Number of Occurrences:   1     Order Specific Question:   Diet:     Answer:   Low Fiber(GI Soft) [2]     Order Specific Question:   Nutrient modifications: (optional)     Answer:   Low Fat [5]       ACTIVITY  As tolerated.  Weight bearing as tolerated    CONSULTATIONS  GI    PROCEDURES  S/p EGD  S/p ERCP    LABORATORY  Lab Results   Component Value Date    SODIUM 139 09/02/2023    POTASSIUM 3.1 (L) 09/02/2023    CHLORIDE 104 09/02/2023    CO2 24 09/02/2023    GLUCOSE 98 09/02/2023    BUN 11 09/02/2023    CREATININE 0.53 09/02/2023        Lab Results   Component Value Date    WBC 8.1 09/02/2023    HEMOGLOBIN 8.5 (L) 09/02/2023    HEMATOCRIT 27.5 (L) 09/02/2023    PLATELETCT 211 09/02/2023      DX-PORTABLE FLUORO > 1 HOUR   Final Result      Portable fluoroscopy utilized for 0.5 seconds.         INTERPRETING LOCATION: [address] [facility], [facility], [facility]      NM-GASTRIC EMPTYING   Final Result      Significantly delayed gastric emptying.         US-RUQ   Final Result      1.  Interval cholecystectomy.   2.  3.9 by 2.8 x 2.7 cm fluid collection in the gallbladder fossa could be a seroma, hematoma or biloma. Infection is not excluded.      DX-CHEST-PORTABLE (1 VIEW)   Final Result      No acute cardiopulmonary abnormality.          Total time of the discharge process 33 minutes.

## 2023-09-02 NOTE — PROGRESS NOTES
..Gastroenterology Progress Note               Author:  MARCIO Simon Date & Time Created: 9/2/2023 7:35 AM       Patient ID:  Name:             Yenni Nieto    YOB: 1990  Age:                 33 y.o.  female  MRN:               [id number]        Medical Decision Making, by Problem:  Active Hospital Problems    Diagnosis     Choledocholithiasis [K80.50]     Hypokalemia [E87.6]     Intractable nausea and vomiting [R11.2]     Seizures (HCC) [R56.9]     S/P laparoscopic cholecystectomy [Z90.49]     Iron deficiency anemia [D50.9]     Elevated liver enzymes [R74.8]     Elevated alkaline phosphatase level [R74.8]            Presenting Chief Complaint:  Abdominal pain, hematemesis     HPI:  This is a pleasant 33-year-old female s/p laparoscopic cholecystectomy 8/19/2023, recent admission 8/25/2023-8/27/2023 who presents back to [facility] on 8/28/2023 for severe right upper quadrant abdominal pain, nausea, vomiting, blood in vomit. No leukocytosis, hemoglobin increased from 8.6 yesterday to 10.3 today.  Platelets normal.  , , alk phos 1110, total bilirubin normal at 0.4, lipase 17, INR 1.05.  US-RUQ showed 3.9 x 2.8 x 2.7 cm fluid collection in the gallbladder fossa could be seroma, hematoma, biloma.  Infection was not included. In regards to her hematemesis and abdominal pain, discussed further evaluation with EGD.     Interval History:  9/2/2023:  LFTs and alk phos continue to trend down  Tolerating low fiber/fat diet    9/1/2023: Patient significantly improved post ERCP. No nausea, tolerating diet, only has mild epigastric pain.   Celiac AB pending  Vit D WNL  Alk phos isoenzymes pending  Dietary consult placed    8/31/2023: ERCP with Dr. Canseco:  IMPRESSION:  #Choledocholithiasis  #Single pigtail plastic pancreatic stent placed  #Single biliary stent 10 [place] by 5 cm placed    8/30/2023: Patient seen after gastric emptying study.  Continues to  have significant midepigastric pain which is related to eating 1 to 4 hours post meals.  Also reports diarrhea yesterday.  Alk phos increased to 1058>803, >39, >103    8/29/2023: EGD with Dr. Escobar:  OPERATIVE FINDINGS:  1. Esophagus: normal  2. Stomach: hiatal hernia, mild erythema in antrum  3. Duodenum: normal duodenum to third portion    Hospital Medications:  Current Facility-Administered Medications   Medication Dose Frequency Provider Last Rate Last Admin    oxyCODONE immediate-release (Roxicodone) tablet 5 mg  5 mg Q3HRS PRN Elana Boateng M.D.        Or    oxyCODONE immediate release (Roxicodone) tablet 10 mg  10 mg Q3HRS PRN Elana Boateng M.D.   10 mg at 08/31/23 0040    Or    HYDROmorphone (Dilaudid) injection 0.5 mg  0.5 mg Q3HRS PRN Elana Boateng M.D.   0.5 mg at 09/01/23 0320    sucralfate (Carafate) 1 GM/10ML suspension 1 g  1 g Q6HRS Elana Boateng M.D.   1 g at 08/30/23 2331    ketorolac (Toradol) injection 30 mg  30 mg Q6HRS PRHALI Bernard M.D.   30 mg at 08/31/23 1903    famotidine (Pepcid) injection 20 mg  20 mg BID Eder Smalls M.D.   20 mg at 09/02/23 0534    senna-docusate (Pericolace Or Senokot S) 8.6-50 MG per tablet 2 Tablet  2 Tablet BID Eder Smalls M.D.   2 Tablet at 09/02/23 0534    And    polyethylene glycol/lytes (Miralax) PACKET 1 Packet  1 Packet QDAY PRN Eder Smalls M.D.        And    magnesium hydroxide (Milk Of Magnesia) suspension 30 mL  30 mL QDAY PRN Eder Smalls M.D.        And    bisacodyl (Dulcolax) suppository 10 mg  10 mg QDAY PRN Eder Smalls M.D.        ondansetron (Zofran) syringe/vial injection 4 mg  4 mg Q4HRS PRN Eder Smalls M.D.        ondansetron (Zofran ODT) dispertab 4 mg  4 mg Q4HRS PRN Eder Smalls M.D.        promethazine (Phenergan) tablet 12.5-25 mg  12.5-25 mg Q4HRS PRHALI Smalls M.D.        promethazine (Phenergan) suppository 12.5-25 mg  12.5-25 mg Q4HRS PRN Eder Smalls M.D.         "prochlorperazine (Compazine) injection 5-10 mg  5-10 mg Q4HRS PRN Eder Smalls M.D.        Pharmacy Consult Request ...Pain Management Review 1 Each  1 Each PHARMACY TO DOSE Eder Smalls M.D.        lamoTRIgine (LaMICtal) tablet 100 mg  100 mg BID Eder Smalls M.D.   100 mg at 09/02/23 0534   Last reviewed on 8/31/2023  1:39 PM by Vikki Baker R.N.       Review of Systems:  Review of Systems   Constitutional:  Negative for chills, fever and malaise/fatigue.   HENT:  Negative for hearing loss.    Eyes:  Negative for blurred vision.   Respiratory:  Negative for cough and shortness of breath.    Cardiovascular:  Negative for chest pain and leg swelling.   Gastrointestinal:  Positive for abdominal pain and diarrhea. Negative for blood in stool, constipation, heartburn, melena, nausea and vomiting.   Genitourinary:  Negative for dysuria.   Musculoskeletal:  Negative for back pain.   Skin:  Negative for rash.   Neurological:  Negative for dizziness and weakness.   Psychiatric/Behavioral:  Negative for depression.    All other systems reviewed and are negative.        Vital signs:  Weight/BMI: Body mass index is 28.69 kg/m².  /50   Pulse 64   Temp 36.8 °C (98.3 °F) (Temporal)   Resp 16   Ht 1.651 m (5' 5\")   Wt 78.2 kg (172 lb 6.4 oz)   SpO2 98%   Vitals:    09/01/23 0800 09/01/23 1600 09/01/23 1953 09/02/23 0348   BP: 110/67 105/58 102/53 104/50   Pulse: 61 84 79 64   Resp: 16 18 16 16   Temp: 36.2 °C (97.2 °F) 35.8 °C (96.5 °F) 36.7 °C (98.1 °F) 36.8 °C (98.3 °F)   TempSrc: Temporal Temporal Temporal Temporal   SpO2: 95% 99% 97% 98%   Weight:       Height:         Oxygen Therapy:  Pulse Oximetry: 98 %, O2 (LPM): 0, O2 Delivery Device: None - Room Air    Intake/Output Summary (Last 24 hours) at 9/2/2023 0735  Last data filed at 9/2/2023 0348  Gross per 24 hour   Intake 290 ml   Output 0 ml   Net 290 ml           Physical Exam:  Physical Exam  Vitals and nursing note reviewed.   Constitutional: "       General: She is not in acute distress.     Appearance: Normal appearance.   HENT:      Head: Normocephalic and atraumatic.      Right Ear: External ear normal.      Left Ear: External ear normal.      Nose: Nose normal.      Mouth/Throat:      Mouth: Mucous membranes are moist.      Pharynx: Oropharynx is clear.   Eyes:      General: No scleral icterus.  Cardiovascular:      Rate and Rhythm: Normal rate and regular rhythm.      Pulses: Normal pulses.      Heart sounds: Normal heart sounds.   Pulmonary:      Effort: Pulmonary effort is normal. No respiratory distress.      Breath sounds: Normal breath sounds.   Abdominal:      General: Abdomen is flat. Bowel sounds are normal. There is no distension.      Palpations: Abdomen is soft.      Tenderness: There is abdominal tenderness.   Musculoskeletal:         General: Normal range of motion.      Cervical back: Normal range of motion.   Skin:     General: Skin is warm and dry.      Capillary Refill: Capillary refill takes less than 2 seconds.   Neurological:      Mental Status: She is alert and oriented to person, place, and time.   Psychiatric:         Mood and Affect: Mood normal.         Behavior: Behavior normal.             Labs:  Recent Labs     08/31/23 0204 09/01/23 0131 09/02/23  0027   SODIUM 138 137 139   POTASSIUM 3.5* 3.8 3.1*   CHLORIDE 107 103 104   CO2 22 21 24   BUN 6* 8 11   CREATININE 0.51 0.40* 0.53   MAGNESIUM 1.6  --  1.8   PHOSPHORUS 3.5  --  3.6   CALCIUM 8.3* 8.8 8.5       Recent Labs     08/31/23  0204 09/01/23 0131 09/02/23  0027   ALTSGPT 166* 183* 119*   ASTSGOT 209* 110* 36   ALKPHOSPHAT 859* 892* 700*   TBILIRUBIN 2.1* 1.0 0.6   GLUCOSE 105* 88 98       Recent Labs     08/31/23 0204 09/01/23 0131 09/02/23  0027   WBC 6.7 9.4 8.1   NEUTSPOLYS  --  82.40*  --    LYMPHOCYTES  --  10.60*  --    MONOCYTES  --  6.40  --    EOSINOPHILS  --  0.10  --    BASOPHILS  --  0.20  --    ASTSGOT 209* 110* 36   ALTSGPT 166* 183* 119*    ALKPHOSPHAT 859* 892* 700*   TBILIRUBIN 2.1* 1.0 0.6       Recent Labs     08/31/23  0204 09/01/23  0131 09/02/23  0027   RBC 3.82* 4.26 4.02*   HEMOGLOBIN 8.0* 8.9* 8.5*   HEMATOCRIT 26.4* 29.4* 27.5*   PLATELETCT 177 179 211       Recent Results (from the past 24 hour(s))   Comp Metabolic Panel    Collection Time: 09/02/23 12:27 AM   Result Value Ref Range    Sodium 139 135 - 145 mmol/L    Potassium 3.1 (L) 3.6 - 5.5 mmol/L    Chloride 104 96 - 112 mmol/L    Co2 24 20 - 33 mmol/L    Anion Gap 11.0 7.0 - 16.0    Glucose 98 65 - 99 mg/dL    Bun 11 8 - 22 mg/dL    Creatinine 0.53 0.50 - 1.40 mg/dL    Calcium 8.5 8.5 - 10.5 mg/dL    Correct Calcium 8.8 8.5 - 10.5 mg/dL    AST(SGOT) 36 12 - 45 U/L    ALT(SGPT) 119 (H) 2 - 50 U/L    Alkaline Phosphatase 700 (H) 30 - 99 U/L    Total Bilirubin 0.6 0.1 - 1.5 mg/dL    Albumin 3.6 3.2 - 4.9 g/dL    Total Protein 5.9 (L) 6.0 - 8.2 g/dL    Globulin 2.3 1.9 - 3.5 g/dL    A-G Ratio 1.6 g/dL   CBC WITHOUT DIFFERENTIAL    Collection Time: 09/02/23 12:27 AM   Result Value Ref Range    WBC 8.1 4.8 - 10.8 K/uL    RBC 4.02 (L) 4.20 - 5.40 M/uL    Hemoglobin 8.5 (L) 12.0 - 16.0 g/dL    Hematocrit 27.5 (L) 37.0 - 47.0 %    MCV 68.4 (L) 81.4 - 97.8 fL    MCH 21.1 (L) 27.0 - 33.0 pg    MCHC 30.9 (L) 32.2 - 35.5 g/dL    RDW 46.2 35.9 - 50.0 fL    Platelet Count 211 164 - 446 K/uL    MPV 9.8 9.0 - 12.9 fL   MAGNESIUM    Collection Time: 09/02/23 12:27 AM   Result Value Ref Range    Magnesium 1.8 1.5 - 2.5 mg/dL   PHOSPHORUS    Collection Time: 09/02/23 12:27 AM   Result Value Ref Range    Phosphorus 3.6 2.5 - 4.5 mg/dL   ESTIMATED GFR    Collection Time: 09/02/23 12:27 AM   Result Value Ref Range    GFR (CKD-EPI) 126 >60 mL/min/1.73 m 2       Radiology Review:  DX-PORTABLE FLUORO > 1 HOUR   Final Result      Portable fluoroscopy utilized for 0.5 seconds.         INTERPRETING LOCATION: [address] [place] [address] [place], [id number]      NM-GASTRIC EMPTYING   Final Result      Significantly delayed gastric  emptying.         US-RUQ   Final Result      1.  Interval cholecystectomy.   2.  3.9 by 2.8 x 2.7 cm fluid collection in the gallbladder fossa could be a seroma, hematoma or biloma. Infection is not excluded.      DX-CHEST-PORTABLE (1 VIEW)   Final Result      No acute cardiopulmonary abnormality.            MDM (Data Review):   -Records reviewed and summarized in current documentation  -I personally reviewed and interpreted the laboratory results  -I personally reviewed the radiology images    Assessment/Recommendations:  Impressions:  Abdominal pain s/p cholecystectomy-fluid collection in gallbladder fossa  Transaminitis-total bilirubin normal.  Elevated alkaline phosphatase   GODWIN r/o on US  Vitamin D WNL  HIV negative    RECOMMENDATIONS:  EGD normal, gastric emptying study with delayed emptying, dietary consult placed. Needs to eat low fat and low fiber with small frequent meals.  Can continue PPI   Isoenzymes of alk phos and celiac pending  Stent exchange in 4-8 weeks with Dr. Canseco, [facility] GI team will call with appointment  Monitor LFTs, anticipate discharge in am    Plan of care discussed with patient, Dr. Boateng and Dr. Canseco    Core Quality Measures   Reviewed items::  Labs, Medications and Radiology reports reviewed

## 2023-09-02 NOTE — CARE PLAN
The patient is Stable - Low risk of patient condition declining or worsening    Shift Goals  Clinical Goals: tolerate diet  Patient Goals: pain mgt  Family Goals: none present    Progress made toward(s) clinical / shift goals:  Denied any nausea.  Pt frequently ambulated in hallway earlier of shift.  Pt denied any abdominal pain.      Patient is not progressing towards the following goals:

## 2023-09-03 LAB — TTG IGA SER IA-ACNC: <2 U/ML (ref 0–3)

## 2023-09-05 ENCOUNTER — HOSPITAL ENCOUNTER (OUTPATIENT)
Facility: MEDICAL CENTER | Age: 33
End: 2023-09-05
Attending: INTERNAL MEDICINE | Admitting: INTERNAL MEDICINE

## 2023-09-05 ENCOUNTER — TELEPHONE (OUTPATIENT)
Dept: SURGERY | Facility: MEDICAL CENTER | Age: 33
End: 2023-09-05

## 2023-09-05 ASSESSMENT — PAIN SCALES - GENERAL: PAIN_LEVEL: 0

## 2023-09-05 NOTE — ANESTHESIA POSTPROCEDURE EVALUATION
Patient: Yenni Nieto    Procedure Summary     Date: 08/31/23 Room / Location: [facility] ROOM 26 / SURGERY SAME DAY [facility]    Anesthesia Start: 1429 Anesthesia Stop: 1513    Procedures:       ERCP (ENDOSCOPIC RETROGRADE CHOLANGIOPANCREATOGRAPHY) (Esophagus)      INSERTION, STENT, BILE DUCT (Esophagus)      SPHINCTEROTOMY (Esophagus)      ERCP, WITH CALCULUS REMOVAL FROM BILE OR PANCREATIC DUCT (Esophagus) Diagnosis: (CHOLEDOCHOLITHIASIS)    Surgeons: Bharathi Canseco M.D. Responsible Provider: Nova Allen M.D.    Anesthesia Type: general ASA Status: 2          Final Anesthesia Type: general  Last vitals  BP See PACU notes   Temp    Pulse    Resp    SpO2      Anesthesia Post Evaluation    Patient location during evaluation: PACU  Patient participation: complete - patient participated  Level of consciousness: awake and alert  Pain score: 0    Airway patency: patent  Anesthetic complications: no  Cardiovascular status: hemodynamically stable  Respiratory status: acceptable  Hydration status: euvolemic    PONV: none          No notable events documented.     Nurse Pain Score: 0 (NPRS)

## 2023-09-12 ENCOUNTER — TELEPHONE (OUTPATIENT)
Dept: GASTROENTEROLOGY | Facility: MEDICAL CENTER | Age: 33
End: 2023-09-12

## 2023-09-12 NOTE — TELEPHONE ENCOUNTER
Received call from surgery scheduling. When [facility] pre admit contacted pt to discuss a pre admit appt as well as costs, pt became verbally aggressive and stated she will not pay anything, but will still get the procedure done.   Pt is listed as self pay and was instructed she needs to apply for Medicaid prior to being able to set up a payment plan. Pt started yelling about her dissatisfaction with the request and ended the call. Unknown if pt will comply with the request made by admitting or if she would like to cancel her procedure scheduled for 10/24.   GI staff attempted to contact pt to get further information, no answer, VM left asking for a return call.

## 2023-09-22 ENCOUNTER — TELEPHONE (OUTPATIENT)
Dept: GASTROENTEROLOGY | Facility: MEDICAL CENTER | Age: 33
End: 2023-09-22

## 2023-09-22 NOTE — TELEPHONE ENCOUNTER
Called and left VM for pt. Asked pt to please call back to clarify if she is working with patient financial assistance to come up with a payment plan for her upcoming procedure on 10/24.

## 2023-11-10 ENCOUNTER — HOSPITAL ENCOUNTER (OUTPATIENT)
Facility: MEDICAL CENTER | Age: 33
End: 2023-11-11
Attending: EMERGENCY MEDICINE | Admitting: INTERNAL MEDICINE

## 2023-11-10 ENCOUNTER — APPOINTMENT (OUTPATIENT)
Dept: RADIOLOGY | Facility: MEDICAL CENTER | Age: 33
End: 2023-11-10
Attending: EMERGENCY MEDICINE

## 2023-11-10 DIAGNOSIS — R10.11 RIGHT UPPER QUADRANT ABDOMINAL PAIN: ICD-10-CM

## 2023-11-10 DIAGNOSIS — R19.7 DIARRHEA, UNSPECIFIED TYPE: ICD-10-CM

## 2023-11-10 DIAGNOSIS — R11.2 NAUSEA AND VOMITING, UNSPECIFIED VOMITING TYPE: ICD-10-CM

## 2023-11-10 LAB
ALBUMIN SERPL BCP-MCNC: 4.4 G/DL (ref 3.2–4.9)
ALBUMIN/GLOB SERPL: 1.6 G/DL
ALP SERPL-CCNC: 56 U/L (ref 30–99)
ALT SERPL-CCNC: 13 U/L (ref 2–50)
ANION GAP SERPL CALC-SCNC: 13 MMOL/L (ref 7–16)
APPEARANCE UR: CLEAR
AST SERPL-CCNC: 16 U/L (ref 12–45)
BASOPHILS # BLD AUTO: 0.5 % (ref 0–1.8)
BASOPHILS # BLD: 0.05 K/UL (ref 0–0.12)
BILIRUB SERPL-MCNC: 0.3 MG/DL (ref 0.1–1.5)
BILIRUB UR QL STRIP.AUTO: NEGATIVE
BUN SERPL-MCNC: 15 MG/DL (ref 8–22)
CALCIUM ALBUM COR SERPL-MCNC: 8.6 MG/DL (ref 8.5–10.5)
CALCIUM SERPL-MCNC: 8.9 MG/DL (ref 8.5–10.5)
CHLORIDE SERPL-SCNC: 106 MMOL/L (ref 96–112)
CO2 SERPL-SCNC: 21 MMOL/L (ref 20–33)
COLOR UR: YELLOW
CREAT SERPL-MCNC: 0.59 MG/DL (ref 0.5–1.4)
EKG IMPRESSION: NORMAL
EOSINOPHIL # BLD AUTO: 0.15 K/UL (ref 0–0.51)
EOSINOPHIL NFR BLD: 1.6 % (ref 0–6.9)
ERYTHROCYTE [DISTWIDTH] IN BLOOD BY AUTOMATED COUNT: 43.2 FL (ref 35.9–50)
GFR SERPLBLD CREATININE-BSD FMLA CKD-EPI: 123 ML/MIN/1.73 M 2
GLOBULIN SER CALC-MCNC: 2.7 G/DL (ref 1.9–3.5)
GLUCOSE SERPL-MCNC: 85 MG/DL (ref 65–99)
GLUCOSE UR STRIP.AUTO-MCNC: NEGATIVE MG/DL
HCG SERPL QL: NEGATIVE
HCT VFR BLD AUTO: 36.9 % (ref 37–47)
HGB BLD-MCNC: 11.2 G/DL (ref 12–16)
IMM GRANULOCYTES # BLD AUTO: 0.03 K/UL (ref 0–0.11)
IMM GRANULOCYTES NFR BLD AUTO: 0.3 % (ref 0–0.9)
KETONES UR STRIP.AUTO-MCNC: NEGATIVE MG/DL
LACTATE SERPL-SCNC: 1.7 MMOL/L (ref 0.5–2)
LEUKOCYTE ESTERASE UR QL STRIP.AUTO: NEGATIVE
LIPASE SERPL-CCNC: 39 U/L (ref 11–82)
LYMPHOCYTES # BLD AUTO: 3.2 K/UL (ref 1–4.8)
LYMPHOCYTES NFR BLD: 34.1 % (ref 22–41)
MCH RBC QN AUTO: 20.8 PG (ref 27–33)
MCHC RBC AUTO-ENTMCNC: 30.4 G/DL (ref 32.2–35.5)
MCV RBC AUTO: 68.5 FL (ref 81.4–97.8)
MICRO URNS: NORMAL
MONOCYTES # BLD AUTO: 0.72 K/UL (ref 0–0.85)
MONOCYTES NFR BLD AUTO: 7.7 % (ref 0–13.4)
NEUTROPHILS # BLD AUTO: 5.24 K/UL (ref 1.82–7.42)
NEUTROPHILS NFR BLD: 55.8 % (ref 44–72)
NITRITE UR QL STRIP.AUTO: NEGATIVE
NRBC # BLD AUTO: 0 K/UL
NRBC BLD-RTO: 0 /100 WBC (ref 0–0.2)
PH UR STRIP.AUTO: 6.5 [PH] (ref 5–8)
PLATELET # BLD AUTO: 193 K/UL (ref 164–446)
PMV BLD AUTO: 9.2 FL (ref 9–12.9)
POTASSIUM SERPL-SCNC: 3.7 MMOL/L (ref 3.6–5.5)
PROT SERPL-MCNC: 7.1 G/DL (ref 6–8.2)
PROT UR QL STRIP: NEGATIVE MG/DL
RBC # BLD AUTO: 5.39 M/UL (ref 4.2–5.4)
RBC UR QL AUTO: NEGATIVE
SODIUM SERPL-SCNC: 140 MMOL/L (ref 135–145)
SP GR UR STRIP.AUTO: 1.01
UROBILINOGEN UR STRIP.AUTO-MCNC: 0.2 MG/DL
WBC # BLD AUTO: 9.4 K/UL (ref 4.8–10.8)

## 2023-11-10 PROCEDURE — 99231 SBSQ HOSP IP/OBS SF/LOW 25: CPT | Mod: 25 | Performed by: SPECIALIST

## 2023-11-10 PROCEDURE — 700102 HCHG RX REV CODE 250 W/ 637 OVERRIDE(OP): Performed by: INTERNAL MEDICINE

## 2023-11-10 PROCEDURE — 36415 COLL VENOUS BLD VENIPUNCTURE: CPT

## 2023-11-10 PROCEDURE — 700105 HCHG RX REV CODE 258: Performed by: EMERGENCY MEDICINE

## 2023-11-10 PROCEDURE — 81003 URINALYSIS AUTO W/O SCOPE: CPT

## 2023-11-10 PROCEDURE — 85025 COMPLETE CBC W/AUTO DIFF WBC: CPT

## 2023-11-10 PROCEDURE — G0378 HOSPITAL OBSERVATION PER HR: HCPCS

## 2023-11-10 PROCEDURE — A9270 NON-COVERED ITEM OR SERVICE: HCPCS | Performed by: EMERGENCY MEDICINE

## 2023-11-10 PROCEDURE — 83605 ASSAY OF LACTIC ACID: CPT

## 2023-11-10 PROCEDURE — 83690 ASSAY OF LIPASE: CPT

## 2023-11-10 PROCEDURE — 74177 CT ABD & PELVIS W/CONTRAST: CPT

## 2023-11-10 PROCEDURE — 93005 ELECTROCARDIOGRAM TRACING: CPT | Performed by: EMERGENCY MEDICINE

## 2023-11-10 PROCEDURE — 96375 TX/PRO/DX INJ NEW DRUG ADDON: CPT

## 2023-11-10 PROCEDURE — A9270 NON-COVERED ITEM OR SERVICE: HCPCS | Performed by: INTERNAL MEDICINE

## 2023-11-10 PROCEDURE — 700111 HCHG RX REV CODE 636 W/ 250 OVERRIDE (IP): Mod: JZ | Performed by: EMERGENCY MEDICINE

## 2023-11-10 PROCEDURE — 700102 HCHG RX REV CODE 250 W/ 637 OVERRIDE(OP): Performed by: EMERGENCY MEDICINE

## 2023-11-10 PROCEDURE — 99285 EMERGENCY DEPT VISIT HI MDM: CPT

## 2023-11-10 PROCEDURE — 99222 1ST HOSP IP/OBS MODERATE 55: CPT | Performed by: INTERNAL MEDICINE

## 2023-11-10 PROCEDURE — 700117 HCHG RX CONTRAST REV CODE 255: Performed by: EMERGENCY MEDICINE

## 2023-11-10 PROCEDURE — 87040 BLOOD CULTURE FOR BACTERIA: CPT | Mod: 91

## 2023-11-10 PROCEDURE — 93005 ELECTROCARDIOGRAM TRACING: CPT

## 2023-11-10 PROCEDURE — 80053 COMPREHEN METABOLIC PANEL: CPT

## 2023-11-10 PROCEDURE — 700105 HCHG RX REV CODE 258: Performed by: INTERNAL MEDICINE

## 2023-11-10 PROCEDURE — 96374 THER/PROPH/DIAG INJ IV PUSH: CPT

## 2023-11-10 PROCEDURE — 84703 CHORIONIC GONADOTROPIN ASSAY: CPT

## 2023-11-10 RX ORDER — HYDROCODONE BITARTRATE AND ACETAMINOPHEN 5; 325 MG/1; MG/1
1 TABLET ORAL ONCE
Status: COMPLETED | OUTPATIENT
Start: 2023-11-10 | End: 2023-11-10

## 2023-11-10 RX ORDER — ACETAMINOPHEN 325 MG/1
650 TABLET ORAL EVERY 6 HOURS PRN
Status: DISCONTINUED | OUTPATIENT
Start: 2023-11-10 | End: 2023-11-11 | Stop reason: HOSPADM

## 2023-11-10 RX ORDER — ONDANSETRON 2 MG/ML
4 INJECTION INTRAMUSCULAR; INTRAVENOUS ONCE
Status: COMPLETED | OUTPATIENT
Start: 2023-11-10 | End: 2023-11-10

## 2023-11-10 RX ORDER — SODIUM CHLORIDE 9 MG/ML
INJECTION, SOLUTION INTRAVENOUS CONTINUOUS
Status: DISCONTINUED | OUTPATIENT
Start: 2023-11-10 | End: 2023-11-11 | Stop reason: HOSPADM

## 2023-11-10 RX ORDER — OXYCODONE HYDROCHLORIDE 10 MG/1
10 TABLET ORAL
Status: DISCONTINUED | OUTPATIENT
Start: 2023-11-10 | End: 2023-11-11 | Stop reason: HOSPADM

## 2023-11-10 RX ORDER — SODIUM CHLORIDE, SODIUM LACTATE, POTASSIUM CHLORIDE, CALCIUM CHLORIDE 600; 310; 30; 20 MG/100ML; MG/100ML; MG/100ML; MG/100ML
1000 INJECTION, SOLUTION INTRAVENOUS ONCE
Status: COMPLETED | OUTPATIENT
Start: 2023-11-10 | End: 2023-11-10

## 2023-11-10 RX ORDER — PROMETHAZINE HYDROCHLORIDE 25 MG/1
12.5-25 SUPPOSITORY RECTAL EVERY 4 HOURS PRN
Status: DISCONTINUED | OUTPATIENT
Start: 2023-11-10 | End: 2023-11-11 | Stop reason: HOSPADM

## 2023-11-10 RX ORDER — PROMETHAZINE HYDROCHLORIDE 25 MG/1
12.5-25 TABLET ORAL EVERY 4 HOURS PRN
Status: DISCONTINUED | OUTPATIENT
Start: 2023-11-10 | End: 2023-11-11 | Stop reason: HOSPADM

## 2023-11-10 RX ORDER — ONDANSETRON 4 MG/1
4 TABLET, ORALLY DISINTEGRATING ORAL EVERY 4 HOURS PRN
Status: DISCONTINUED | OUTPATIENT
Start: 2023-11-10 | End: 2023-11-11 | Stop reason: HOSPADM

## 2023-11-10 RX ORDER — MORPHINE SULFATE 4 MG/ML
4 INJECTION INTRAVENOUS ONCE
Status: COMPLETED | OUTPATIENT
Start: 2023-11-10 | End: 2023-11-10

## 2023-11-10 RX ORDER — MORPHINE SULFATE 4 MG/ML
4 INJECTION INTRAVENOUS
Status: DISCONTINUED | OUTPATIENT
Start: 2023-11-10 | End: 2023-11-11 | Stop reason: HOSPADM

## 2023-11-10 RX ORDER — OXYCODONE HYDROCHLORIDE 5 MG/1
5 TABLET ORAL
Status: DISCONTINUED | OUTPATIENT
Start: 2023-11-10 | End: 2023-11-11 | Stop reason: HOSPADM

## 2023-11-10 RX ORDER — PROCHLORPERAZINE EDISYLATE 5 MG/ML
5-10 INJECTION INTRAMUSCULAR; INTRAVENOUS EVERY 4 HOURS PRN
Status: DISCONTINUED | OUTPATIENT
Start: 2023-11-10 | End: 2023-11-11 | Stop reason: HOSPADM

## 2023-11-10 RX ORDER — LAMOTRIGINE 100 MG/1
100 TABLET ORAL 2 TIMES DAILY
Status: DISCONTINUED | OUTPATIENT
Start: 2023-11-10 | End: 2023-11-11 | Stop reason: HOSPADM

## 2023-11-10 RX ORDER — ONDANSETRON 2 MG/ML
4 INJECTION INTRAMUSCULAR; INTRAVENOUS EVERY 4 HOURS PRN
Status: DISCONTINUED | OUTPATIENT
Start: 2023-11-10 | End: 2023-11-11 | Stop reason: HOSPADM

## 2023-11-10 RX ADMIN — SODIUM CHLORIDE: 9 INJECTION, SOLUTION INTRAVENOUS at 14:53

## 2023-11-10 RX ADMIN — OXYCODONE HYDROCHLORIDE 10 MG: 10 TABLET ORAL at 14:46

## 2023-11-10 RX ADMIN — OXYCODONE HYDROCHLORIDE 10 MG: 10 TABLET ORAL at 22:23

## 2023-11-10 RX ADMIN — SODIUM CHLORIDE: 9 INJECTION, SOLUTION INTRAVENOUS at 22:12

## 2023-11-10 RX ADMIN — OXYCODONE HYDROCHLORIDE 10 MG: 10 TABLET ORAL at 18:55

## 2023-11-10 RX ADMIN — HYDROCODONE BITARTRATE AND ACETAMINOPHEN 1 TABLET: 5; 325 TABLET ORAL at 12:45

## 2023-11-10 RX ADMIN — LAMOTRIGINE 100 MG: 100 TABLET ORAL at 18:56

## 2023-11-10 RX ADMIN — MORPHINE SULFATE 4 MG: 4 INJECTION, SOLUTION INTRAMUSCULAR; INTRAVENOUS at 06:47

## 2023-11-10 RX ADMIN — ONDANSETRON 4 MG: 2 INJECTION INTRAMUSCULAR; INTRAVENOUS at 06:47

## 2023-11-10 RX ADMIN — SODIUM CHLORIDE, POTASSIUM CHLORIDE, SODIUM LACTATE AND CALCIUM CHLORIDE 1000 ML: 600; 310; 30; 20 INJECTION, SOLUTION INTRAVENOUS at 06:41

## 2023-11-10 RX ADMIN — IOHEXOL 100 ML: 350 INJECTION, SOLUTION INTRAVENOUS at 10:30

## 2023-11-10 ASSESSMENT — COPD QUESTIONNAIRES
COPD SCREENING SCORE: 2
HAVE YOU SMOKED AT LEAST 100 CIGARETTES IN YOUR ENTIRE LIFE: NO/DON'T KNOW
DURING THE PAST 4 WEEKS HOW MUCH DID YOU FEEL SHORT OF BREATH: SOME OF THE TIME
DO YOU EVER COUGH UP ANY MUCUS OR PHLEGM?: YES, A FEW DAYS A WEEK OR MONTH

## 2023-11-10 ASSESSMENT — LIFESTYLE VARIABLES
ON A TYPICAL DAY WHEN YOU DRINK ALCOHOL HOW MANY DRINKS DO YOU HAVE: 0
DOES PATIENT WANT TO STOP DRINKING: NO
TOTAL SCORE: 0
EVER HAD A DRINK FIRST THING IN THE MORNING TO STEADY YOUR NERVES TO GET RID OF A HANGOVER: NO
CONSUMPTION TOTAL: NEGATIVE
EVER FELT BAD OR GUILTY ABOUT YOUR DRINKING: NO
AVERAGE NUMBER OF DAYS PER WEEK YOU HAVE A DRINK CONTAINING ALCOHOL: 0
HAVE PEOPLE ANNOYED YOU BY CRITICIZING YOUR DRINKING: NO
TOTAL SCORE: 0
TOTAL SCORE: 0
HOW MANY TIMES IN THE PAST YEAR HAVE YOU HAD 5 OR MORE DRINKS IN A DAY: 0
HAVE YOU EVER FELT YOU SHOULD CUT DOWN ON YOUR DRINKING: NO
ALCOHOL_USE: NO

## 2023-11-10 ASSESSMENT — PAIN DESCRIPTION - PAIN TYPE
TYPE: ACUTE PAIN

## 2023-11-10 ASSESSMENT — ENCOUNTER SYMPTOMS
ABDOMINAL PAIN: 1
DIARRHEA: 1
NAUSEA: 1
VOMITING: 1

## 2023-11-10 ASSESSMENT — FIBROSIS 4 INDEX
FIB4 SCORE: 0.74
FIB4 SCORE: 0.5

## 2023-11-10 ASSESSMENT — PATIENT HEALTH QUESTIONNAIRE - PHQ9
2. FEELING DOWN, DEPRESSED, IRRITABLE, OR HOPELESS: NOT AT ALL
SUM OF ALL RESPONSES TO PHQ9 QUESTIONS 1 AND 2: 0
1. LITTLE INTEREST OR PLEASURE IN DOING THINGS: NOT AT ALL

## 2023-11-10 NOTE — ED PROVIDER NOTES
"ED Provider Note    CHIEF COMPLAINT  Chief Complaint   Patient presents with    Abdominal Pain     Pt c/o constant right side abdominal pain that began yesterday and is progressively getting worse. Pt reports nausea no vomiting, she has appendix no gallbladder.       EXTERNAL RECORDS REVIEWED  Inpatient Notes discharge summary from August 2023 reviewed which showed that biliary stent removal planned for 4 to 8 weeks from discharge    HPI/ROS  LIMITATION TO HISTORY   Select: : None    OUTSIDE HISTORIAN(S):  none    Yenni Nieto is a 33 y.o. female with seizure history and recent cholecystectomy, common bile duct stone retention with ERCP and biliary stent placement in August who presents with right-sided abdominal pain.    Patient states the right-sided upper and lower abdominal pain began yesterday and \"feels like my gallbladder again.\"  She also developed nausea and diarrhea of which she has had 2 episodes that were watery, nonbloody.    Patient does not know when her last period was.  She is a G2, P2.    PAST MEDICAL HISTORY   has a past medical history of Seizures (HCC).    SURGICAL HISTORY   has a past surgical history that includes simon by laparoscopy (8/19/2023); upper gi endoscopy,diagnosis (N/A, 8/29/2023); ercp,diagnostic (N/A, 8/31/2023); ercp,w/removal stone,fay/pancr ducts (N/A, 8/31/2023); biliary stent placement (N/A, 8/31/2023); and sphincterotomy (N/A, 8/31/2023).    FAMILY HISTORY  History reviewed. No pertinent family history.    SOCIAL HISTORY  Social History     Tobacco Use    Smoking status: Never    Smokeless tobacco: Never   Vaping Use    Vaping Use: Never used   Substance and Sexual Activity    Alcohol use: Never    Drug use: Never    Sexual activity: Not on file       CURRENT MEDICATIONS  Home Medications       Reviewed by Ladonna Swanson (Pharmacy Tech) on 11/10/23 at 1001  Med List Status: Complete     Medication Last Dose Status   ibuprofen (ADVIL) 200 MG Tab 11/9/2023 " "Active   lamoTRIgine (LAMICTAL) 100 MG Tab 11/9/2023 Active   Prenatal Vit-Fe Fumarate-FA (PRENATAL PO) 11/9/2023 Active                    ALLERGIES  Allergies   Allergen Reactions    Keppra [Levetiracetam] Unspecified     \"Increased seizures\" per patient       PHYSICAL EXAM  VITAL SIGNS: /58   Pulse 66   Temp 36.6 °C (97.9 °F) (Temporal)   Resp 18   Ht 1.651 m (5' 5\")   Wt 79.4 kg (175 lb)   SpO2 99%   BMI 29.12 kg/m²    General:  WDWN female, nontoxic but uncomfortable appearing; A+Ox3; V/S as above; hypotensive at triage, afebrile, not tachycardic; blood pressure 108 systolic in the room  Skin: warm and dry; good color; no rash  HEENT: NCAT; EOMs intact; PERRL; no scleral icterus   Neck: FROM; no stridor  Cardiovascular: Regular heart rate and rhythm.  No murmurs, rubs, or gallops; pulses 2+ bilaterally radially and DP areas  Lungs: No respiratory distress or tachypnea; Clear to auscultation with good air movement bilaterally.  No wheezes, rhonchi, or rales.   Abdomen: BS present; soft; right-sided abdominal tenderness; ND; no rebound, guarding, or rigidity.  No organomegaly or pulsatile mass  Extremities: GANNON x 4; no e/o trauma; no pedal edema; neg Nawaf's  Neurologic: CNs III-XII grossly intact; speech clear; distal sensation intact; strength 5/5 UE/LEs; able to stand to transfer from the wheelchair to the stretcher and remove clothing  Psychiatric: Appropriate affect, normal mood      DIAGNOSTIC STUDIES / PROCEDURES  EKG  I have independently interpreted this EKG which shows no acute ST changes.    LABS  Results for orders placed or performed during the hospital encounter of 11/10/23   CBC WITH DIFFERENTIAL   Result Value Ref Range    WBC 9.4 4.8 - 10.8 K/uL    RBC 5.39 4.20 - 5.40 M/uL    Hemoglobin 11.2 (L) 12.0 - 16.0 g/dL    Hematocrit 36.9 (L) 37.0 - 47.0 %    MCV 68.5 (L) 81.4 - 97.8 fL    MCH 20.8 (L) 27.0 - 33.0 pg    MCHC 30.4 (L) 32.2 - 35.5 g/dL    RDW 43.2 35.9 - 50.0 fL    Platelet " Count 193 164 - 446 K/uL    MPV 9.2 9.0 - 12.9 fL    Neutrophils-Polys 55.80 44.00 - 72.00 %    Lymphocytes 34.10 22.00 - 41.00 %    Monocytes 7.70 0.00 - 13.40 %    Eosinophils 1.60 0.00 - 6.90 %    Basophils 0.50 0.00 - 1.80 %    Immature Granulocytes 0.30 0.00 - 0.90 %    Nucleated RBC 0.00 0.00 - 0.20 /100 WBC    Neutrophils (Absolute) 5.24 1.82 - 7.42 K/uL    Lymphs (Absolute) 3.20 1.00 - 4.80 K/uL    Monos (Absolute) 0.72 0.00 - 0.85 K/uL    Eos (Absolute) 0.15 0.00 - 0.51 K/uL    Baso (Absolute) 0.05 0.00 - 0.12 K/uL    Immature Granulocytes (abs) 0.03 0.00 - 0.11 K/uL    NRBC (Absolute) 0.00 K/uL   COMP METABOLIC PANEL   Result Value Ref Range    Sodium 140 135 - 145 mmol/L    Potassium 3.7 3.6 - 5.5 mmol/L    Chloride 106 96 - 112 mmol/L    Co2 21 20 - 33 mmol/L    Anion Gap 13.0 7.0 - 16.0    Glucose 85 65 - 99 mg/dL    Bun 15 8 - 22 mg/dL    Creatinine 0.59 0.50 - 1.40 mg/dL    Calcium 8.9 8.5 - 10.5 mg/dL    Correct Calcium 8.6 8.5 - 10.5 mg/dL    AST(SGOT) 16 12 - 45 U/L    ALT(SGPT) 13 2 - 50 U/L    Alkaline Phosphatase 56 30 - 99 U/L    Total Bilirubin 0.3 0.1 - 1.5 mg/dL    Albumin 4.4 3.2 - 4.9 g/dL    Total Protein 7.1 6.0 - 8.2 g/dL    Globulin 2.7 1.9 - 3.5 g/dL    A-G Ratio 1.6 g/dL   LIPASE   Result Value Ref Range    Lipase 39 11 - 82 U/L   HCG QUAL SERUM   Result Value Ref Range    Beta-Hcg Qualitative Serum Negative Negative   URINALYSIS,CULTURE IF INDICATED    Specimen: Urine, Clean Catch   Result Value Ref Range    Color Yellow     Character Clear     Specific Gravity 1.012 <1.035    Ph 6.5 5.0 - 8.0    Glucose Negative Negative mg/dL    Ketones Negative Negative mg/dL    Protein Negative Negative mg/dL    Bilirubin Negative Negative    Urobilinogen, Urine 0.2 Negative    Nitrite Negative Negative    Leukocyte Esterase Negative Negative    Occult Blood Negative Negative    Micro Urine Req see below    LACTIC ACID   Result Value Ref Range    Lactic Acid 1.7 0.5 - 2.0 mmol/L   ESTIMATED GFR    Result Value Ref Range    GFR (CKD-EPI) 123 >60 mL/min/1.73 m 2   EKG   Result Value Ref Range    Report       [facility] Emergency Dept.    Test Date:  2023-11-10  Pt Name:    ALEN ALONSO         Department: ER  MRN:        [id number]                      Room:  Gender:     Female                       Technician: [id number]  :        1990                   Requested By:ER TRIAGE PROTOCOL  Order #:    [id number]                    Reading MD: KIMBERLYN LOZANO MD    Measurements  Intervals                                Axis  Rate:       69                           P:          76  KY:         149                          QRS:        28  QRSD:       94                           T:          45  QT:         412  QTc:        442    Interpretive Statements  Sinus rhythm  Low voltage, precordial leads  Compared to ECG 2023 16:16:13  No significant changes  Electronically Signed On 11- 06:42:51 PST by KIMBERLYN LOZANO MD           RADIOLOGY  I have independently interpreted the diagnostic imaging associated with this visit and am waiting the final reading from the radiologist.   My preliminary interpretation is as follows:   No free air    Radiologist interpretation:   CT-ABDOMEN-PELVIS WITH   Final Result      1.  Cholecystectomy.   2.  Plastic biliary stent in place associated with pneumobilia in the left lobe.   3.  Normal appendix.   4.  Otherwise, no acute intra-abdominal or pelvic disease process evident.            COURSE & MEDICAL DECISION MAKING    ED Observation Status? Yes; I am placing the patient in to an observation status due to a diagnostic uncertainty as well as therapeutic intensity. Patient placed in observation status at 6:44 AM, 11/10/2023.     Observation plan is as follows: Labs, imaging, pain control, rehydration    Upon Reevaluation, the patient's condition has: not improved; and will be escalated to hospitalization.    Patient discharged from ED  Observation status at 1245 PM (Time) 11/10/23 (Date).     INITIAL ASSESSMENT, COURSE AND PLAN  Care Narrative: This is a 33-year-old female who had a cholecystectomy and then required ERCP for retained stone removal with stent placement and sphincterotomy in August who presents for 24 hours of right-sided abdominal pain that she describes as similar to her biliary colic accompanied by nausea and diarrhea.  Patient is afebrile, uncomfortable appearing, and was hypotensive but not tachycardic at triage.    EKG demonstrated no acute ST changes or arrhythmia.    Labs, imaging, LR bolus, Zofran, and morphine were ordered.    11:40 AM  I discussed the case with GI.  They feel the patient may be experiencing symptoms of bile enteropathy  They recommend Cholestin bid.I inquired about the stents that was scheduled to be removed within 48 weeks of placement.  GI will check with their scheduling to see why the patient's stent removal did not happen already.    12:18 PM  GI nurse practitioner called me back.  She inquired with scheduling about why the stent removal did not happen as an outpatient.  The patient did not apply for Medicaid as advised by outpatient GI office and either did not receive or refused a certified letter regarding the issue.    GI plans to remove the stent tomorrow and recommend admission.    I discussed the case with the CDU hospitalist, Dr. Casiano, who agrees to hospitalize the patient.    [place] ordered for the patient reporting pain.      DISPOSITION AND DISCUSSIONS  I have discussed management of the patient with the following physicians and CURRY's:    GI  .    FINAL DIAGNOSIS  1. Right upper quadrant abdominal pain    2. Diarrhea, unspecified type           Electronically signed by: Anabel Arreaga M.D., 11/10/2023 6:37 AM

## 2023-11-10 NOTE — ED NOTES
PIV G20 placed on the right AC. Blood collected with 1 set of blood culture sent to lab.    ED phlebotomist notified for 2nd blood culture  draw.

## 2023-11-10 NOTE — H&P
Hospital Medicine History & Physical Note    Date of Service  11/10/2023    Primary Care Physician  Pcp Pt States None    Consultants  GI    Specialist Names: Dr Escobar    Code Status  Full Code    Chief Complaint  Chief Complaint   Patient presents with    Abdominal Pain     Pt c/o constant right side abdominal pain that began yesterday and is progressively getting worse. Pt reports nausea no vomiting, she has appendix no gallbladder.       History of Presenting Illness  Yenni Nieto is a 33 y.o. female who presented 11/10/2023 with abdominal pain that started yesterday night.  The patient reports right upper quadrant abdominal pain associated with nausea and vomiting.  She endorses some chills but no fevers.  She also reports some loose stools.  She denies any bleeding in her stools.  Patient has a history of lap simon that was performed on 8/19/2023 complicated by common bile duct stone retention and was followed by ERCP with biliary stent placement.  Patient was due to have her stent removed 4 to 8 weeks after placement however was unable to do so.  GI has been consulted and the patient will undergo biliary stent removal tomorrow.    I discussed the plan of care with patient, bedside RN, and ERP .    Review of Systems  Review of Systems   Gastrointestinal:  Positive for abdominal pain, diarrhea, nausea and vomiting.       Past Medical History   has a past medical history of Seizures (HCC).    Surgical History   has a past surgical history that includes simon by laparoscopy (8/19/2023); pr upper gi endoscopy,diagnosis (N/A, 8/29/2023); pr ercp,diagnostic (N/A, 8/31/2023); pr ercp,w/removal stone,fay/pancr ducts (N/A, 8/31/2023); biliary stent placement (N/A, 8/31/2023); and sphincterotomy (N/A, 8/31/2023).     Family History  family history is not on file.   Family history reviewed with patient. There is no family history that is pertinent to the chief complaint.     Social History   reports that she has  "never smoked. She has never used smokeless tobacco. She reports that she does not drink alcohol and does not use drugs.    Allergies  Allergies   Allergen Reactions    Keppra [Levetiracetam] Unspecified     \"Increased seizures\" per patient       Medications  Prior to Admission Medications   Prescriptions Last Dose Informant Patient Reported? Taking?   Prenatal Vit-Fe Fumarate-FA (PRENATAL PO) 11/9/2023 at AM Patient Yes Yes   Sig: Take 1 Tablet by mouth every day.   ibuprofen (ADVIL) 200 MG Tab 11/9/2023 at 1700 Patient Yes No   Sig: Take 400 mg by mouth every 6 hours as needed for Mild Pain. 2 tablets = 400mg   lamoTRIgine (LAMICTAL) 100 MG Tab 11/9/2023 at 1800 Patient Yes No   Sig: Take 100 mg by mouth 2 times a day.      Facility-Administered Medications: None       Physical Exam  Temp:  [36.2 °C (97.2 °F)-36.6 °C (97.9 °F)] 36.2 °C (97.2 °F)  Pulse:  [58-79] 61  Resp:  [16-18] 16  BP: ()/(46-67) 99/46  SpO2:  [88 %-100 %] 99 %  Blood Pressure: 99/46   Temperature: 36.2 °C (97.2 °F)   Pulse: 61   Respiration: 16   Pulse Oximetry: 99 %       Physical Exam  Vitals and nursing note reviewed.   Constitutional:       General: She is not in acute distress.  HENT:      Head: Normocephalic.      Mouth/Throat:      Mouth: Mucous membranes are moist.   Eyes:      Pupils: Pupils are equal, round, and reactive to light.   Cardiovascular:      Rate and Rhythm: Normal rate and regular rhythm.      Pulses: Normal pulses.      Heart sounds: Normal heart sounds.   Pulmonary:      Effort: Pulmonary effort is normal.      Breath sounds: Normal breath sounds.   Abdominal:      Palpations: Abdomen is soft.      Tenderness: There is no abdominal tenderness.   Musculoskeletal:         General: No swelling.      Cervical back: Neck supple.   Skin:     General: Skin is warm.      Coloration: Skin is not jaundiced.   Neurological:      General: No focal deficit present.      Mental Status: She is alert and oriented to person, " "place, and time.   Psychiatric:         Mood and Affect: Mood normal.         Behavior: Behavior normal.         Laboratory:  Recent Labs     11/10/23  0634   WBC 9.4   RBC 5.39   HEMOGLOBIN 11.2*   HEMATOCRIT 36.9*   MCV 68.5*   MCH 20.8*   MCHC 30.4*   RDW 43.2   PLATELETCT 193   MPV 9.2     Recent Labs     11/10/23  0634   SODIUM 140   POTASSIUM 3.7   CHLORIDE 106   CO2 21   GLUCOSE 85   BUN 15   CREATININE 0.59   CALCIUM 8.9     Recent Labs     11/10/23  0634   ALTSGPT 13   ASTSGOT 16   ALKPHOSPHAT 56   TBILIRUBIN 0.3   LIPASE 39   GLUCOSE 85         No results for input(s): \"NTPROBNP\" in the last 72 hours.      No results for input(s): \"TROPONINT\" in the last 72 hours.    Imaging:  CT-ABDOMEN-PELVIS WITH   Final Result      1.  Cholecystectomy.   2.  Plastic biliary stent in place associated with pneumobilia in the left lobe.   3.  Normal appendix.   4.  Otherwise, no acute intra-abdominal or pelvic disease process evident.            Assessment/Plan:  Justification for Admission Status  I anticipate this patient is appropriate for observation status at this time because      Patient will need a Med/Surg bed on MEDICAL service .  The need is secondary to .    * Abdominal pain- (present on admission)  Assessment & Plan  Secondary to biliary stent retention which may be causing irritation versus gastroenteritis  IV fluid hydration with NS  Clear liquid diet  N.p.o. at midnight  Plan for biliary stent removal tomorrow per GI  Pain control with Tylenol, oxycodone and IV morphine, monitor respiratory status closely    Seizures (HCC)- (present on admission)  Assessment & Plan  Continue Lamictal    Nausea and vomiting- (present on admission)  Assessment & Plan  IV fluid hydration and IV emetics    Bipolar disorder (HCC)- (present on admission)  Assessment & Plan  History of        VTE prophylaxis: SCDs/TEDs  "

## 2023-11-10 NOTE — CONSULTS
GASTROENTEROLOGY CONSULTATION    PATIENT NAME: Yenni Nieto  : 1990  CSN: [id number]  MRN:  [id number]     CONSULTATION DATE:  11/10/2023    PRIMARY CARE PROVIDER:  Pcp Pt States None      REASON FOR CONSULT:  Abdominal pain    HISTORY OF PRESENT ILLNESS:  Yenni Nieto is a 33 y.o. female who presented 11/10/2023 with abdominal pain that started yesterday night.  The patient reports right upper quadrant abdominal pain associated with nausea and vomiting.  She endorses some chills but no fevers.  She also reports some loose stools.  She denies any bleeding in her stools.  Patient has a history of lap simon that was performed on 2023 complicated by common bile duct stone retention and was followed by ERCP with biliary stent placement.  Patient was due to have her stent removed 4 to 8 weeks after placement however was unable to do so due to to insurance purposes. She also was diagnosed with gastroparesis during hospital but is not taking    PAST MEDICAL HISTORY:  Past Medical History:   Diagnosis Date    Seizures (HCC)        PAST SURGICAL HISTORY:  Past Surgical History:   Procedure Laterality Date    [place] ERCP,DIAGNOSTIC N/A 2023    Procedure: ERCP (ENDOSCOPIC RETROGRADE CHOLANGIOPANCREATOGRAPHY);  Surgeon: Bharathi Canseco M.D.;  Location: SURGERY SAME DAY [facility];  Service: Gastroenterology    [place] ERCP,W/REMOVAL STONE,JEB/PANCR DUCTS N/A 2023    Procedure: ERCP, WITH CALCULUS REMOVAL FROM BILE OR PANCREATIC DUCT;  Surgeon: Bharathi Canseco M.D.;  Location: SURGERY SAME DAY [facility];  Service: Gastroenterology    BILIARY STENT PLACEMENT N/A 2023    Procedure: INSERTION, STENT, BILE DUCT;  Surgeon: Bharathi Canseco M.D.;  Location: SURGERY SAME DAY [facility];  Service: Gastroenterology    SPHINCTEROTOMY N/A 2023    Procedure: SPHINCTEROTOMY;  Surgeon: Bharathi Canseco M.D.;  Location: SURGERY SAME DAY [facility];  Service: Gastroenterology    [place] UPPER GI ENDOSCOPY,DIAGNOSIS N/A  "8/29/2023    Procedure: GASTROSCOPY;  Surgeon: Betty Escobar M.D.;  Location: SURGERY SAME DAY [facility];  Service: Gastroenterology    FERNANDO BY LAPAROSCOPY  8/19/2023    Procedure: CHOLECYSTECTOMY, LAPAROSCOPIC;  Surgeon: Ky Ward M.D.;  Location: SURGERY [facility];  Service: General        CURRENT MEDS:  Current Facility-Administered Medications   Medication Dose Route Frequency Provider Last Rate Last Admin    Respiratory Therapy Consult   Nebulization Continuous RT Eduardo Casiano M.D.        NS infusion   Intravenous Continuous Eduardo Casiano M.D. 125 mL/hr at 11/10/23 1453 New Bag at 11/10/23 1453    acetaminophen (Tylenol) tablet 650 mg  650 mg Oral Q6HRS PRN Eduardo Casiano M.D.        Pharmacy Consult Request ...Pain Management Review 1 Each  1 Each Other PHARMACY TO DOSE Eduardo Casiano M.D.        oxyCODONE immediate-release (Roxicodone) tablet 5 mg  5 mg Oral Q3HRS PRN Eduardo Casiano M.D.        Or    oxyCODONE immediate release (Roxicodone) tablet 10 mg  10 mg Oral Q3HRS PRN Eduardo Casiano M.D.   10 mg at 11/10/23 1446    Or    morphine 4 MG/ML injection 4 mg  4 mg Intravenous Q3HRS PRN Eduardo Casiano M.D.        ondansetron (Zofran) syringe/vial injection 4 mg  4 mg Intravenous Q4HRS PRN Eduardo Casiano M.D.        ondansetron (Zofran ODT) dispertab 4 mg  4 mg Oral Q4HRS PRN Eduardo Casiano M.D.        promethazine (Phenergan) tablet 12.5-25 mg  12.5-25 mg Oral Q4HRS PRN Eduardo Casiano M.D.        promethazine (Phenergan) suppository 12.5-25 mg  12.5-25 mg Rectal Q4HRS PRN Eduardo Casiano M.D.        prochlorperazine (Compazine) injection 5-10 mg  5-10 mg Intravenous Q4HRS PRN Eduardo Casiano M.D.        lamoTRIgine (LaMICtal) tablet 100 mg  100 mg Oral BID Eduardo Casiano M.D.            ALLERGIES:  Allergies   Allergen Reactions    Keppra [Levetiracetam] Unspecified     \"Increased seizures\" per patient       SOCIAL HISTORY:  Social History     Socioeconomic History    Marital status: Single     Spouse name: Not on " "file    Number of children: Not on file    Years of education: Not on file    Highest education level: Not on file   Occupational History    Not on file   Tobacco Use    Smoking status: Never    Smokeless tobacco: Never   Vaping Use    Vaping Use: Never used   Substance and Sexual Activity    Alcohol use: Never    Drug use: Never    Sexual activity: Not on file   Other Topics Concern    Not on file   Social History Narrative    Not on file     Social Determinants of Health     Financial Resource Strain: Not on file   Food Insecurity: Not on file   Transportation Needs: Not on file   Physical Activity: Not on file   Stress: Not on file   Social Connections: Not on file   Intimate Partner Violence: Not on file   Housing Stability: Not on file       FAMILY HISTORY:  History reviewed. No pertinent family history.     REVIEW OF SYSTEMS:  General ROS: Negative for - chills, fever, night sweats or weight loss.  HEENT ROS: Negative  Respiratory ROS: Negative for - cough or shortness of breath.  Cardiovascular ROS:  Negative for - chest pain or palpitations.  Gastrointestinal ROS: As per the history of present illness.  Genito-Urinary ROS: Negative  Musculoskeletal ROS: Negative.  Neurological ROS: Negative  Skin ROS: negative  Hematology ROS: negative  Endocrinology ROS: Negative        PHYSICAL EXAM:  VITALS: BP 99/46   Pulse 61   Temp 36.2 °C (97.2 °F) (Temporal)   Resp 16   Ht 1.651 m (5' 5\")   Wt 77 kg (169 lb 12.1 oz)   SpO2 99%   BMI 28.25 kg/m²   GEN:  Yenni Nieto is a 33 y.o. female in no acute distress.  HEENT: Mucous membranes pink and moist.  Sclera anicteric.    NECK:    Neck supple without lymphadenopathy or thyromegaly.  LUNGS: Clear to auscultation posteriorly.  HEART: Regular rate and rhythm. S1 and S2 normal. No murmurs, gallops      LABS:  Recent Labs     11/10/23  0634   WBC 9.4   MCV 68.5*     Recent Labs     11/10/23  0634   GLUCOSE 85   BUN 15   CO2 21     Lab Results   Component Value " "Date    INR 1.05 08/28/2023     No components found for: \"ALT\", \"AST\", \"GGT\", \"ALKPHOS\"  No results found for: \"BILINEO\"    GI team reviewed recent image.  Also we reviewed Dr. Canseco's previous ERCP note.  The GI team will offer upper GI scope and stent removal on 11/11.     Diagnosis: Previous stent placement for choledocholithiasis.   Procedure: Esophagogastroduodenoscopy with stent removal.       IMPRESSION/PLAN:  Abdominal pain  Nausea and vomiting  Gastroparesis    NPO after midnight  EGD with stent removal  If patient has pain and nausea after that she should be placed on motility agent and bile acid binder.        Betty Escobar M.D.  Gastroenterology      "

## 2023-11-10 NOTE — ASSESSMENT & PLAN NOTE
Secondary to biliary stent retention which may be causing irritation versus gastroenteritis  IV fluid hydration with NS  Clear liquid diet  N.p.o. at midnight  Plan for biliary stent removal tomorrow per GI  Pain control with Tylenol, oxycodone and IV morphine, monitor respiratory status closely

## 2023-11-10 NOTE — ED TRIAGE NOTES
Chief Complaint   Patient presents with    Abdominal Pain     Pt c/o constant right side abdominal pain that began yesterday and is progressively getting worse. Pt reports nausea no vomiting, she has appendix no gallbladder.     Pt ambulatory to triage for above complaint. Pt denies fevers or dysuria.    Pt is alert/oriented and follows commands. Pt speaking in full sentences and responds appropriately to questions. Weak and fatigue in triage and respirations are even and unlabored.     Pt placed in lobby and educated on triage process. Pt encouraged to alert staff for any changes in condition.

## 2023-11-10 NOTE — ED NOTES
Medication history reviewed with PT at bedside.    Med rec is complete per PT reporting.    Allergies reviewed.     Patient denies any outpatient antibiotics in the last 30 days.     Patient is not taking anticoagulants.    Preferred pharmacy for this visit - [facility] [address] ([phone]).

## 2023-11-11 ENCOUNTER — ANESTHESIA (OUTPATIENT)
Dept: SURGERY | Facility: MEDICAL CENTER | Age: 33
End: 2023-11-11

## 2023-11-11 ENCOUNTER — ANESTHESIA EVENT (OUTPATIENT)
Dept: SURGERY | Facility: MEDICAL CENTER | Age: 33
End: 2023-11-11

## 2023-11-11 VITALS
WEIGHT: 169.75 LBS | HEIGHT: 65 IN | SYSTOLIC BLOOD PRESSURE: 108 MMHG | RESPIRATION RATE: 22 BRPM | BODY MASS INDEX: 28.28 KG/M2 | OXYGEN SATURATION: 97 % | HEART RATE: 63 BPM | DIASTOLIC BLOOD PRESSURE: 57 MMHG | TEMPERATURE: 97 F

## 2023-11-11 PROBLEM — R10.9 ABDOMINAL PAIN: Status: RESOLVED | Noted: 2023-08-25 | Resolved: 2023-11-11

## 2023-11-11 PROBLEM — R11.2 NAUSEA AND VOMITING: Status: RESOLVED | Noted: 2023-08-28 | Resolved: 2023-11-11

## 2023-11-11 LAB
ANION GAP SERPL CALC-SCNC: 9 MMOL/L (ref 7–16)
BUN SERPL-MCNC: 8 MG/DL (ref 8–22)
CALCIUM SERPL-MCNC: 8.2 MG/DL (ref 8.5–10.5)
CHLORIDE SERPL-SCNC: 108 MMOL/L (ref 96–112)
CO2 SERPL-SCNC: 22 MMOL/L (ref 20–33)
CREAT SERPL-MCNC: 0.48 MG/DL (ref 0.5–1.4)
ERYTHROCYTE [DISTWIDTH] IN BLOOD BY AUTOMATED COUNT: 44.6 FL (ref 35.9–50)
GFR SERPLBLD CREATININE-BSD FMLA CKD-EPI: 129 ML/MIN/1.73 M 2
GLUCOSE SERPL-MCNC: 75 MG/DL (ref 65–99)
HCT VFR BLD AUTO: 31.6 % (ref 37–47)
HGB BLD-MCNC: 9.6 G/DL (ref 12–16)
MCH RBC QN AUTO: 21.2 PG (ref 27–33)
MCHC RBC AUTO-ENTMCNC: 30.4 G/DL (ref 32.2–35.5)
MCV RBC AUTO: 69.8 FL (ref 81.4–97.8)
PLATELET # BLD AUTO: 149 K/UL (ref 164–446)
PMV BLD AUTO: 9.6 FL (ref 9–12.9)
POTASSIUM SERPL-SCNC: 3.8 MMOL/L (ref 3.6–5.5)
RBC # BLD AUTO: 4.53 M/UL (ref 4.2–5.4)
SODIUM SERPL-SCNC: 139 MMOL/L (ref 135–145)
WBC # BLD AUTO: 6.9 K/UL (ref 4.8–10.8)

## 2023-11-11 PROCEDURE — 96376 TX/PRO/DX INJ SAME DRUG ADON: CPT

## 2023-11-11 PROCEDURE — 700111 HCHG RX REV CODE 636 W/ 250 OVERRIDE (IP): Performed by: STUDENT IN AN ORGANIZED HEALTH CARE EDUCATION/TRAINING PROGRAM

## 2023-11-11 PROCEDURE — 96375 TX/PRO/DX INJ NEW DRUG ADDON: CPT

## 2023-11-11 PROCEDURE — 700102 HCHG RX REV CODE 250 W/ 637 OVERRIDE(OP): Performed by: INTERNAL MEDICINE

## 2023-11-11 PROCEDURE — 160002 HCHG RECOVERY MINUTES (STAT): Performed by: INTERNAL MEDICINE

## 2023-11-11 PROCEDURE — 160202 HCHG ENDO MINUTES - 1ST 30 MINS LEVEL 3: Performed by: INTERNAL MEDICINE

## 2023-11-11 PROCEDURE — 700105 HCHG RX REV CODE 258: Performed by: INTERNAL MEDICINE

## 2023-11-11 PROCEDURE — 160035 HCHG PACU - 1ST 60 MINS PHASE I: Performed by: INTERNAL MEDICINE

## 2023-11-11 PROCEDURE — 700111 HCHG RX REV CODE 636 W/ 250 OVERRIDE (IP): Mod: JZ | Performed by: INTERNAL MEDICINE

## 2023-11-11 PROCEDURE — 700101 HCHG RX REV CODE 250: Performed by: STUDENT IN AN ORGANIZED HEALTH CARE EDUCATION/TRAINING PROGRAM

## 2023-11-11 PROCEDURE — 85027 COMPLETE CBC AUTOMATED: CPT

## 2023-11-11 PROCEDURE — 160048 HCHG OR STATISTICAL LEVEL 1-5: Performed by: INTERNAL MEDICINE

## 2023-11-11 PROCEDURE — 80048 BASIC METABOLIC PNL TOTAL CA: CPT

## 2023-11-11 PROCEDURE — 99239 HOSP IP/OBS DSCHRG MGMT >30: CPT | Performed by: INTERNAL MEDICINE

## 2023-11-11 PROCEDURE — 700105 HCHG RX REV CODE 258: Performed by: STUDENT IN AN ORGANIZED HEALTH CARE EDUCATION/TRAINING PROGRAM

## 2023-11-11 PROCEDURE — 43247 EGD REMOVE FOREIGN BODY: CPT | Performed by: INTERNAL MEDICINE

## 2023-11-11 PROCEDURE — A9270 NON-COVERED ITEM OR SERVICE: HCPCS | Performed by: INTERNAL MEDICINE

## 2023-11-11 PROCEDURE — G0378 HOSPITAL OBSERVATION PER HR: HCPCS

## 2023-11-11 PROCEDURE — 160009 HCHG ANES TIME/MIN: Performed by: INTERNAL MEDICINE

## 2023-11-11 RX ORDER — IPRATROPIUM BROMIDE AND ALBUTEROL SULFATE 2.5; .5 MG/3ML; MG/3ML
3 SOLUTION RESPIRATORY (INHALATION)
Status: DISCONTINUED | OUTPATIENT
Start: 2023-11-11 | End: 2023-11-11 | Stop reason: HOSPADM

## 2023-11-11 RX ORDER — SODIUM CHLORIDE, SODIUM LACTATE, POTASSIUM CHLORIDE, CALCIUM CHLORIDE 600; 310; 30; 20 MG/100ML; MG/100ML; MG/100ML; MG/100ML
INJECTION, SOLUTION INTRAVENOUS
Status: DISCONTINUED | OUTPATIENT
Start: 2023-11-11 | End: 2023-11-11 | Stop reason: SURG

## 2023-11-11 RX ORDER — HYDROMORPHONE HYDROCHLORIDE 1 MG/ML
0.4 INJECTION, SOLUTION INTRAMUSCULAR; INTRAVENOUS; SUBCUTANEOUS
Status: DISCONTINUED | OUTPATIENT
Start: 2023-11-11 | End: 2023-11-11 | Stop reason: HOSPADM

## 2023-11-11 RX ORDER — OXYCODONE HCL 5 MG/5 ML
5 SOLUTION, ORAL ORAL
Status: DISCONTINUED | OUTPATIENT
Start: 2023-11-11 | End: 2023-11-11 | Stop reason: HOSPADM

## 2023-11-11 RX ORDER — METOCLOPRAMIDE 10 MG/1
10 TABLET ORAL 2 TIMES DAILY PRN
Qty: 10 TABLET | Refills: 0 | Status: SHIPPED | OUTPATIENT
Start: 2023-11-11

## 2023-11-11 RX ORDER — OXYCODONE HCL 5 MG/5 ML
10 SOLUTION, ORAL ORAL
Status: DISCONTINUED | OUTPATIENT
Start: 2023-11-11 | End: 2023-11-11 | Stop reason: HOSPADM

## 2023-11-11 RX ORDER — METOCLOPRAMIDE 10 MG/1
10 TABLET ORAL 2 TIMES DAILY PRN
Qty: 10 TABLET | Refills: 0 | Status: SHIPPED | OUTPATIENT
Start: 2023-11-11 | End: 2023-11-11 | Stop reason: SDUPTHER

## 2023-11-11 RX ORDER — HALOPERIDOL 5 MG/ML
1 INJECTION INTRAMUSCULAR
Status: DISCONTINUED | OUTPATIENT
Start: 2023-11-11 | End: 2023-11-11 | Stop reason: HOSPADM

## 2023-11-11 RX ORDER — MEPERIDINE HYDROCHLORIDE 25 MG/ML
12.5 INJECTION INTRAMUSCULAR; INTRAVENOUS; SUBCUTANEOUS
Status: DISCONTINUED | OUTPATIENT
Start: 2023-11-11 | End: 2023-11-11 | Stop reason: HOSPADM

## 2023-11-11 RX ORDER — LIDOCAINE HYDROCHLORIDE 20 MG/ML
INJECTION, SOLUTION EPIDURAL; INFILTRATION; INTRACAUDAL; PERINEURAL PRN
Status: DISCONTINUED | OUTPATIENT
Start: 2023-11-11 | End: 2023-11-11 | Stop reason: SURG

## 2023-11-11 RX ORDER — HYDROMORPHONE HYDROCHLORIDE 1 MG/ML
0.1 INJECTION, SOLUTION INTRAMUSCULAR; INTRAVENOUS; SUBCUTANEOUS
Status: DISCONTINUED | OUTPATIENT
Start: 2023-11-11 | End: 2023-11-11 | Stop reason: HOSPADM

## 2023-11-11 RX ORDER — ONDANSETRON 2 MG/ML
4 INJECTION INTRAMUSCULAR; INTRAVENOUS
Status: DISCONTINUED | OUTPATIENT
Start: 2023-11-11 | End: 2023-11-11 | Stop reason: HOSPADM

## 2023-11-11 RX ORDER — HYDROMORPHONE HYDROCHLORIDE 1 MG/ML
0.2 INJECTION, SOLUTION INTRAMUSCULAR; INTRAVENOUS; SUBCUTANEOUS
Status: DISCONTINUED | OUTPATIENT
Start: 2023-11-11 | End: 2023-11-11 | Stop reason: HOSPADM

## 2023-11-11 RX ORDER — ONDANSETRON 4 MG/1
4 TABLET, ORALLY DISINTEGRATING ORAL EVERY 6 HOURS PRN
Qty: 20 TABLET | Refills: 0 | Status: SHIPPED | OUTPATIENT
Start: 2023-11-11

## 2023-11-11 RX ORDER — DIPHENHYDRAMINE HYDROCHLORIDE 50 MG/ML
12.5 INJECTION INTRAMUSCULAR; INTRAVENOUS
Status: DISCONTINUED | OUTPATIENT
Start: 2023-11-11 | End: 2023-11-11 | Stop reason: HOSPADM

## 2023-11-11 RX ORDER — HYDRALAZINE HYDROCHLORIDE 20 MG/ML
5 INJECTION INTRAMUSCULAR; INTRAVENOUS
Status: DISCONTINUED | OUTPATIENT
Start: 2023-11-11 | End: 2023-11-11 | Stop reason: HOSPADM

## 2023-11-11 RX ORDER — ONDANSETRON 4 MG/1
4 TABLET, ORALLY DISINTEGRATING ORAL EVERY 6 HOURS PRN
Qty: 20 TABLET | Refills: 0 | Status: SHIPPED | OUTPATIENT
Start: 2023-11-11 | End: 2023-11-11 | Stop reason: SDUPTHER

## 2023-11-11 RX ORDER — SODIUM CHLORIDE, SODIUM LACTATE, POTASSIUM CHLORIDE, CALCIUM CHLORIDE 600; 310; 30; 20 MG/100ML; MG/100ML; MG/100ML; MG/100ML
INJECTION, SOLUTION INTRAVENOUS CONTINUOUS
Status: DISCONTINUED | OUTPATIENT
Start: 2023-11-11 | End: 2023-11-11 | Stop reason: HOSPADM

## 2023-11-11 RX ORDER — METOCLOPRAMIDE HYDROCHLORIDE 5 MG/ML
10 INJECTION INTRAMUSCULAR; INTRAVENOUS ONCE
Status: COMPLETED | OUTPATIENT
Start: 2023-11-11 | End: 2023-11-11

## 2023-11-11 RX ORDER — LABETALOL HYDROCHLORIDE 5 MG/ML
5 INJECTION, SOLUTION INTRAVENOUS
Status: DISCONTINUED | OUTPATIENT
Start: 2023-11-11 | End: 2023-11-11 | Stop reason: HOSPADM

## 2023-11-11 RX ORDER — EPHEDRINE SULFATE 50 MG/ML
5 INJECTION, SOLUTION INTRAVENOUS
Status: DISCONTINUED | OUTPATIENT
Start: 2023-11-11 | End: 2023-11-11 | Stop reason: HOSPADM

## 2023-11-11 RX ADMIN — OXYCODONE HYDROCHLORIDE 10 MG: 10 TABLET ORAL at 05:39

## 2023-11-11 RX ADMIN — OXYCODONE HYDROCHLORIDE 10 MG: 10 TABLET ORAL at 08:46

## 2023-11-11 RX ADMIN — PROPOFOL 70 MG: 10 INJECTION, EMULSION INTRAVENOUS at 14:11

## 2023-11-11 RX ADMIN — LIDOCAINE HYDROCHLORIDE 80 MG: 20 INJECTION, SOLUTION EPIDURAL; INFILTRATION; INTRACAUDAL at 14:11

## 2023-11-11 RX ADMIN — METOCLOPRAMIDE 10 MG: 5 INJECTION, SOLUTION INTRAMUSCULAR; INTRAVENOUS at 16:21

## 2023-11-11 RX ADMIN — FENTANYL CITRATE 50 MCG: 50 INJECTION, SOLUTION INTRAMUSCULAR; INTRAVENOUS at 14:11

## 2023-11-11 RX ADMIN — SODIUM CHLORIDE: 9 INJECTION, SOLUTION INTRAVENOUS at 05:40

## 2023-11-11 RX ADMIN — ONDANSETRON 4 MG: 2 INJECTION INTRAMUSCULAR; INTRAVENOUS at 10:51

## 2023-11-11 RX ADMIN — SODIUM CHLORIDE, POTASSIUM CHLORIDE, SODIUM LACTATE AND CALCIUM CHLORIDE: 600; 310; 30; 20 INJECTION, SOLUTION INTRAVENOUS at 14:08

## 2023-11-11 RX ADMIN — PROPOFOL 30 MG: 10 INJECTION, EMULSION INTRAVENOUS at 14:14

## 2023-11-11 RX ADMIN — LAMOTRIGINE 100 MG: 100 TABLET ORAL at 08:29

## 2023-11-11 RX ADMIN — PROPOFOL 30 MG: 10 INJECTION, EMULSION INTRAVENOUS at 14:17

## 2023-11-11 ASSESSMENT — PAIN DESCRIPTION - PAIN TYPE
TYPE: ACUTE PAIN

## 2023-11-11 ASSESSMENT — PAIN SCALES - GENERAL: PAIN_LEVEL: 1

## 2023-11-11 NOTE — CARE PLAN
The patient is Watcher - Medium risk of patient condition declining or worsening    Shift Goals  Clinical Goals: GI procedure  Patient Goals: comfort  Family Goals: NA    Progress made toward(s) clinical / shift goals:    Problem: Pain - Standard  Goal: Alleviation of pain or a reduction in pain to the patient’s comfort goal  Description: Target End Date:  Prior to discharge or change in level of care    Document on Vitals flowsheet    1.  Document pain using the appropriate pain scale per order or unit policy  2.  Educate and implement non-pharmacologic comfort measures (i.e. relaxation, distraction, massage, cold/heat therapy, etc.)  3.  Pain management medications as ordered  4.  Reassess pain after pain med administration per policy  5.  If opiods administered assess patient's response to pain medication is appropriate per POSS sedation scale  6.  Follow pain management plan developed in collaboration with patient and interdisciplinary team (including palliative care or pain specialists if applicable)  Outcome: Progressing     Problem: Knowledge Deficit - Standard  Goal: Patient and family/care givers will demonstrate understanding of plan of care, disease process/condition, diagnostic tests and medications  Description: Target End Date:  1-3 days or as soon as patient condition allows    Document in Patient Education    1.  Patient and family/caregiver oriented to unit, equipment, visitation policy and means for communicating concern  2.  Complete/review Learning Assessment  3.  Assess knowledge level of disease process/condition, treatment plan, diagnostic tests and medications  4.  Explain disease process/condition, treatment plan, diagnostic tests and medications  Outcome: Progressing       Patient is not progressing towards the following goals:

## 2023-11-11 NOTE — ANESTHESIA PREPROCEDURE EVALUATION
Case: [id number] Date/Time: 11/11/23 1303    Procedure: GASTROSCOPY WITH STENT REMOVAL    Location: [facility] OR 06 / SURGERY [facility]    Surgeons: Polly Butler M.D.          34 yo F w/ h/o seizures, admitted w/ abdominal pain in the s/o biliary stent    Relevant Problems   NEURO   (positive) Seizures (HCC)       Physical Exam    Airway   Mallampati: II  TM distance: >3 FB  Neck ROM: full       Cardiovascular - normal exam  Rhythm: regular  Rate: normal  (-) murmur     Dental - normal exam           Pulmonary - normal exam  Breath sounds clear to auscultation     Abdominal    Neurological - normal exam                   Anesthesia Plan    ASA 2       Plan - MAC               Induction: intravenous    Postoperative Plan: Postoperative administration of opioids is intended.    Pertinent diagnostic labs and testing reviewed    Informed Consent:    Anesthetic plan and risks discussed with patient.    Use of blood products discussed with: patient whom consented to blood products.

## 2023-11-11 NOTE — ANESTHESIA TIME REPORT
Anesthesia Start and Stop Event Times       Date Time Event    11/11/2023 1406 Ready for Procedure     1408 Anesthesia Start     1422 Anesthesia Stop          Responsible Staff  11/11/23      Name Role Begin End    Wilmar Robb M.D. Anesth 1408 1422          Overtime Reason:  no overtime (within assigned shift)    Comments:

## 2023-11-11 NOTE — PROCEDURES
OPERATIVE REPORT    PATIENT:   Yenni Nieto   1990       PREOPERATIVE DIAGNOSES/INDICATIONS: Abd pain from stent     POSTOPERATIVE DIAGNOSIS:   Grossly normal esophagus, stomach.   Plastic stent seen at 2nd portion of duodenum, no complication from it, removed with gentle pull using a snare.   Relook with scope, no complication.       PROCEDURE:  ESOPHAGOGASTRODUODENOSCOPY    PHYSICIAN:  Polly Butler MD. MPH.    ANESTHESIA:  Per anesthesiologist or ICU team.     LOCATION: [facility]    CONSENT:  OBTAINED. The risks, benefits and alternatives of the procedure were discussed in details. The risks include and are not limited to bleeding, infection, perforation, missed lesions, and sedations risks (cardiopulmonary compromise and allergic reaction to medications).    DESCRIPTION: The patient presented to the procedure room.  After routine checkup was performed, patient was brought into the endoscopy suite.  Patient was placed on his left lateral decubitus position. A bite block was placed in patient's mouth. Patient was sedated by anesthesia.  Vital signs were monitored throughout the procedure.  Oxygenation support was provided throughout the procedure. Upper endoscope was inserted into patient's mouth and advanced to the second portion of the duodenum under direct visualization.      Once the site was reached and examined, the upper endoscope was withdrawn.  Retroflexion was made within the stomach.  The stomach was decompressed, scope was withdrawn and the procedure was terminated.    The patient tolerated the procedure well.  There were no immediate complications.    OPERATIVE FINDINGS:  Grossly normal esophagus, stomach.   Plastic stent seen at 2nd portion of duodenum, no complication from it, removed with gentle pull using a snare.   Relook with scope, no complication.     RECOMMENDATIONS:  Supportive care.  Okay to resume diet slowly.   GI sign off.       This note has been transcribed with digital  voice recognition software and although it has been reviewed may contain grammatical or word errors

## 2023-11-11 NOTE — DISCHARGE SUMMARY
Discharge Summary    CHIEF COMPLAINT ON ADMISSION  Chief Complaint   Patient presents with    Abdominal Pain     Pt c/o constant right side abdominal pain that began yesterday and is progressively getting worse. Pt reports nausea no vomiting, she has appendix no gallbladder.       Reason for Admission  Abd pain     Admission Date  11/10/2023    CODE STATUS  Full Code    HPI & HOSPITAL COURSE  Yenni Nieto is a 33 y.o. female who presented 11/10/2023 with abdominal pain that started yesterday night.  The patient reports right upper quadrant abdominal pain associated with nausea and vomiting.  She endorses some chills but no fevers.  She also reports some loose stools.  She denies any bleeding in her stools.  Patient has a history of lap simon that was performed on 8/19/2023 complicated by common bile duct stone retention and was followed by ERCP with biliary stent placement.  Patient was due to have her stent removed 4 to 8 weeks after placement however was unable to do so.  GI was consulted and the patient underwent EGD with removal of biliary stent without complication.  No obvious abnormality was noted on EGD.  After the procedure her symptoms had improved and she was tolerating a diet.  She will be discharged home with antiemetics and instructed to follow-up with PCP and GI    Therefore, she is discharged in good and stable condition to home with close outpatient follow-up.    The patient recovered much more quickly than anticipated on admission.    Discharge Date  11/11/23    FOLLOW UP ITEMS POST DISCHARGE  PCP    DISCHARGE DIAGNOSES  Principal Problem (Resolved):    Abdominal pain (POA: Yes)  Active Problems:    Bipolar disorder (HCC) (POA: Yes)    Seizures (HCC) (POA: Yes)  Resolved Problems:    Nausea and vomiting (POA: Yes)      FOLLOW UP  No future appointments.  No follow-up provider specified.    MEDICATIONS ON DISCHARGE     Medication List        START taking these medications        Instructions  "  metoclopramide 10 MG Tabs  Commonly known as: Reglan   Take 1 Tablet by mouth 2 times a day as needed (nausea).  Dose: 10 mg     ondansetron 4 MG Tbdp  Commonly known as: Zofran ODT   Take 1 Tablet by mouth every 6 hours as needed for Nausea/Vomiting.  Dose: 4 mg            CONTINUE taking these medications        Instructions   Advil 200 MG Tabs  Generic drug: ibuprofen   Take 400 mg by mouth every 6 hours as needed for Mild Pain. 2 tablets = 400mg  Dose: 400 mg     lamoTRIgine 100 MG Tabs  Commonly known as: LaMICtal   Take 100 mg by mouth 2 times a day.  Dose: 100 mg     PRENATAL PO   Take 1 Tablet by mouth every day.  Dose: 1 Tablet              Allergies  Allergies   Allergen Reactions    Keppra [Levetiracetam] Unspecified     \"Increased seizures\" per patient       DIET  Orders Placed This Encounter   Procedures    Diet Order Diet: Full Liquid     Standing Status:   Standing     Number of Occurrences:   1     Order Specific Question:   Diet:     Answer:   Full Liquid [11]       ACTIVITY  As tolerated.  Weight bearing as tolerated    CONSULTATIONS  none    PROCEDURES  none    LABORATORY  Lab Results   Component Value Date    SODIUM 139 11/11/2023    POTASSIUM 3.8 11/11/2023    CHLORIDE 108 11/11/2023    CO2 22 11/11/2023    GLUCOSE 75 11/11/2023    BUN 8 11/11/2023    CREATININE 0.48 (L) 11/11/2023        Lab Results   Component Value Date    WBC 6.9 11/11/2023    HEMOGLOBIN 9.6 (L) 11/11/2023    HEMATOCRIT 31.6 (L) 11/11/2023    PLATELETCT 149 (L) 11/11/2023        Total time of the discharge process exceeds 32 minutes.  "

## 2023-11-11 NOTE — CARE PLAN
The patient is Stable - Low risk of patient condition declining or worsening    Shift Goals  Clinical Goals: NPO at midnight for procedure tomorrow; Pain control;  Patient Goals: Pain control;  Family Goals: MICHEAL;    Problem: Knowledge Deficit - Standard  Goal: Patient and family/care givers will demonstrate understanding of plan of care, disease process/condition, diagnostic tests and medications  Description: Target End Date: 11/12/23    1.  Patient oriented to unit, equipment, visitation policy and means for communicating concern  2.  Assess knowledge level of disease process/condition, treatment plan, diagnostic tests and medications  3.  Explain disease process/condition, treatment plan, diagnostic tests and medications  Outcome: Progressing     Problem: Pain - Standard  Goal: Alleviation of pain or a reduction in pain to the patient’s comfort goal  Description: Target End Date:  11/12/23    1.  Document pain using the appropriate pain scale per order or unit policy  2.  Educate and implement non-pharmacologic comfort measures (i.e. relaxation, distraction, cold/heat therapy, etc.)  3.  Pain management medications as ordered  4.  Reassess pain after pain med administration per policy  5.  If opiods administered assess patient's response to pain medication is appropriate per POSS sedation scale  Outcome: Progressing

## 2023-11-11 NOTE — ANESTHESIA POSTPROCEDURE EVALUATION
Patient: Yenni Nieto    Procedure Summary       Date: 11/11/23 Room / Location: [facility] 06 / SURGERY [facility]    Anesthesia Start: 1408 Anesthesia Stop: 1422    Procedure: GASTROSCOPY WITH STENT REMOVAL (Esophagus) Diagnosis: (stent removal)    Surgeons: Polly Butler M.D. Responsible Provider: Wilmar Robb M.D.    Anesthesia Type: MAC ASA Status: 2            Final Anesthesia Type: MAC  Last vitals  BP   Blood Pressure: 109/58    Temp   36.5 °C (97.7 °F)    Pulse   62   Resp   16    SpO2   100 %      Anesthesia Post Evaluation    Patient location during evaluation: PACU  Patient participation: complete - patient participated  Level of consciousness: awake and alert  Pain score: 1    Airway patency: patent  Anesthetic complications: no  Cardiovascular status: hemodynamically stable  Respiratory status: acceptable  Hydration status: euvolemic    PONV: none          No notable events documented.     Nurse Pain Score: 8 (NPRS)

## 2023-11-12 NOTE — PROGRESS NOTES
Patient given discharge instructions. Discussed diet, activity, follow up appointments, after care, symptoms and management, and prescriptions provided. Packet sent with patient.  IV d/c'd, discharge paperwork signed, and all questions answered. Patient discharged home via car with relative. Patient discharged from discharge lounge via walking.

## 2023-11-15 LAB
BACTERIA BLD CULT: NORMAL
BACTERIA BLD CULT: NORMAL
SIGNIFICANT IND 70042: NORMAL
SIGNIFICANT IND 70042: NORMAL
SITE SITE: NORMAL
SITE SITE: NORMAL
SOURCE SOURCE: NORMAL
SOURCE SOURCE: NORMAL

## (undated) DEVICE — WATER IRRIGATION STERILE 1000ML (12EA/CA)

## (undated) DEVICE — TROCAR Z THREAD 11 X 100 - BLADED (6/BX)

## (undated) DEVICE — BAG RETRIEVAL 10ML (10EA/BX)

## (undated) DEVICE — CHLORAPREP 26 ML APPLICATOR - ORANGE TINT(25/CA)

## (undated) DEVICE — SET LEADWIRE 5 LEAD BEDSIDE DISPOSABLE ECG (1SET OF 5/EA)

## (undated) DEVICE — CANNULA O2 COMFORT SOFT EAR ADULT 7 FT TUBING (50/CA)

## (undated) DEVICE — NEPTUNE 4 PORT MANIFOLD - (20/PK)

## (undated) DEVICE — KIT CUSTOM PROCEDURE SINGLE FOR ENDO  (15/CA)

## (undated) DEVICE — MASK PANORAMIC OXYGEN PRO2 (30EA/CA)

## (undated) DEVICE — MASK ANESTHESIA ADULT  - (100/CA)

## (undated) DEVICE — SET TUBING PNEUMOCLEAR HIGH FLOW SMOKE EVACUATION (10EA/BX)

## (undated) DEVICE — KIT ANESTHESIA W/CIRCUIT & 3/LT BAG W/FILTER (20EA/CA)

## (undated) DEVICE — STAPLER SKIN DISP - (6/BX 10BX/CA) VISISTAT

## (undated) DEVICE — SODIUM CHL IRRIGATION 0.9% 1000ML (12EA/CA)

## (undated) DEVICE — SUCTION INSTRUMENT YANKAUER BULBOUS TIP W/O VENT (50EA/CA)

## (undated) DEVICE — CANISTER SUCTION RIGID RED 1500CC (40EA/CA)

## (undated) DEVICE — CATHETER IV 20 GA X 1-1/4 ---SURG.& SDS ONLY--- (50EA/BX)

## (undated) DEVICE — ERBE SIDE FIRE - (10/CA)

## (undated) DEVICE — SUTURE 4-0 VICRYL PLUS FS-2 - 27 INCH (36/BX)

## (undated) DEVICE — MANIFOLD NEPTUNE 1 PORT (20/PK)

## (undated) DEVICE — SENSOR OXIMETER ADULT SPO2 RD SET (20EA/BX)

## (undated) DEVICE — FILM CASSETTE ENDO

## (undated) DEVICE — CAPTIVATOR II-15MM ROUND STIFF  (40/BX)

## (undated) DEVICE — TOWEL STOP TIMEOUT SAFETY FLAG (40EA/CA)

## (undated) DEVICE — DERMABOND ADVANCED - (12EA/BX)

## (undated) DEVICE — SUTURE GENERAL

## (undated) DEVICE — INTRODUCER STENT NAVIFLEX 10FR

## (undated) DEVICE — SYRINGE 30 ML LS (56/BX)

## (undated) DEVICE — CANNULA W/SEAL 5X100 Z-THRE - ADED KII (12/BX)

## (undated) DEVICE — SET EXTENSION WITH 2 PORTS (48EA/CA) ***PART #2C8610 IS A SUBSTITUTE*****

## (undated) DEVICE — SUTURE 0 COATED VICRYL 6-18IN - (12PK/BX)

## (undated) DEVICE — PORT AUXILLARY WATER (50EA/BX)

## (undated) DEVICE — SHEET TRANSVERSE LAP - (12EA/CA)

## (undated) DEVICE — MASK OXYGEN VNYL ADLT MED CONC WITH 7 FOOT TUBING  - (50EA/CA)

## (undated) DEVICE — BLOCK BITE MAXI DENTAL RETENTION RIM (100EA/BX)

## (undated) DEVICE — LACTATED RINGERS INJ 1000 ML - (14EA/CA 60CA/PF)

## (undated) DEVICE — PACK LAP CHOLE OR - (2EA/CA)

## (undated) DEVICE — TUBE CONNECTING SUCTION - CLEAR PLASTIC STERILE 72 IN (50EA/CA)

## (undated) DEVICE — SPONGE GAUZESTER. 2X2 4-PL - (2/PK 50PK/BX 30BX/CS)

## (undated) DEVICE — TUBE E-T HI-LO CUFF 7.0MM (10EA/PK)

## (undated) DEVICE — ELECTRODE DUAL RETURN W/ CORD - (50/PK)

## (undated) DEVICE — GOWN WARMING STANDARD FLEX - (30/CA)

## (undated) DEVICE — BITE BLOCK, DISP.

## (undated) DEVICE — CLIP LG INTNL HRZN TI ESCP LGT - (20/BX)

## (undated) DEVICE — SET SUCTION/IRRIGATION WITH DISPOSABLE TIP (6/CA )PART #0250-070-520 IS A SUB

## (undated) DEVICE — KNIFE RX NEEDLE

## (undated) DEVICE — COVER ENDOSCOPE DISTAL SINGLE USE (20EA/BX)

## (undated) DEVICE — DRESSING TRANSPARENT FILM TEGADERM 2.375 X 2.75"  (100EA/BX)"

## (undated) DEVICE — BUTTON ENDOSCOPY DISPOSABLE

## (undated) DEVICE — BALLOON RETRIEVAL EXTRACTOR PRO RX   9-12MM

## (undated) DEVICE — ELECTRODE 850 FOAM ADHESIVE - HYDROGEL RADIOTRNSPRNT (50/PK)

## (undated) DEVICE — SPONGE PEANUT - (5/PK 50PK/CA)

## (undated) DEVICE — CANISTER SUCTION 3000ML MECHANICAL FILTER AUTO SHUTOFF MEDI-VAC NONSTERILE LF DISP  (40EA/CA)

## (undated) DEVICE — BOVIE  BLADE 6 EXTENDED - (50/PK)

## (undated) DEVICE — SYRINGE NON SAFETY 10 CC 20 GA X 1-1/2 IN (100/BX 4BX/CA)

## (undated) DEVICE — COVER LIGHT HANDLE ALC PLUS DISP (18EA/BX)

## (undated) DEVICE — SUTURE 0 VICRYL PLUS CT-1 - 36 INCH (36/BX)

## (undated) DEVICE — DEVICE BIOPSY RX BILIARY SYSTEM CAP (10EA/BX)

## (undated) DEVICE — STERI STRIP COMPOUND BENZOIN - TINCTURE 0.6ML WITH APPLICATOR (40EA/BX)

## (undated) DEVICE — SLEEVE, VASO, THIGH, MED

## (undated) DEVICE — TROCAR 5X100 BLADED Z-THREAD - KII (6/BX)

## (undated) DEVICE — SPONGE GAUZESTER 4 X 4 4PLY - (128PK/CA)

## (undated) DEVICE — KIT PROCEDURE DOUBLE ENDO ONLY (5/CA)

## (undated) DEVICE — BLOCK BITE ENDOSCOPIC 2809 - (100/BX) INTERMEDIATE

## (undated) DEVICE — JAGTOME RX 39 PRE-LOADED .025 X 260CM

## (undated) DEVICE — PAD LAP STERILE 18 X 18 - (5/PK 40PK/CA)

## (undated) DEVICE — CLOSURE SKIN STRIP 1/2 X 4 IN - (STERI STRIP) (50/BX 4BX/CA)

## (undated) DEVICE — TUBING CLEARLINK DUO-VENT - C-FLO (48EA/CA)

## (undated) DEVICE — GLOVE BIOGEL SZ 7.5 SURGICAL PF LTX - (50PR/BX 4BX/CA)

## (undated) DEVICE — CLIP MED INTNL HRZN TI ESCP - (25/BX)

## (undated) DEVICE — PACK MAJOR BASIN - (2EA/CA)

## (undated) DEVICE — CLIP MED LG INTNL HRZN TI ESCP - (20/BX)